# Patient Record
Sex: FEMALE | Employment: FULL TIME | URBAN - METROPOLITAN AREA
[De-identification: names, ages, dates, MRNs, and addresses within clinical notes are randomized per-mention and may not be internally consistent; named-entity substitution may affect disease eponyms.]

---

## 2018-01-23 ENCOUNTER — HOSPITAL ENCOUNTER (INPATIENT)
Facility: CLINIC | Age: 28
LOS: 8 days | Discharge: HOME | DRG: 885 | End: 2018-01-31
Attending: EMERGENCY MEDICINE | Admitting: PSYCHIATRY & NEUROLOGY
Payer: COMMERCIAL

## 2018-01-23 DIAGNOSIS — F32.1 MODERATE SINGLE CURRENT EPISODE OF MAJOR DEPRESSIVE DISORDER (H): Primary | ICD-10-CM

## 2018-01-23 DIAGNOSIS — R45.851 SUICIDAL IDEATION: ICD-10-CM

## 2018-01-23 PROBLEM — F32.A DEPRESSION: Status: ACTIVE | Noted: 2018-01-23

## 2018-01-23 LAB
ANION GAP SERPL CALCULATED.3IONS-SCNC: 8 MMOL/L (ref 3–14)
APAP SERPL-MCNC: <2 MG/L (ref 10–20)
B-HCG SERPL-ACNC: <1 IU/L (ref 0–5)
BUN SERPL-MCNC: 11 MG/DL (ref 7–30)
CALCIUM SERPL-MCNC: 8.9 MG/DL (ref 8.5–10.1)
CHLORIDE SERPL-SCNC: 105 MMOL/L (ref 94–109)
CO2 SERPL-SCNC: 25 MMOL/L (ref 20–32)
CREAT SERPL-MCNC: 0.8 MG/DL (ref 0.52–1.04)
ERYTHROCYTE [DISTWIDTH] IN BLOOD BY AUTOMATED COUNT: 12.6 % (ref 10–15)
ETHANOL SERPL-MCNC: <0.01 G/DL
GFR SERPL CREATININE-BSD FRML MDRD: 87 ML/MIN/1.7M2
GLUCOSE SERPL-MCNC: 108 MG/DL (ref 70–99)
HCT VFR BLD AUTO: 37.7 % (ref 35–47)
HGB BLD-MCNC: 13.3 G/DL (ref 11.7–15.7)
MCH RBC QN AUTO: 29.4 PG (ref 26.5–33)
MCHC RBC AUTO-ENTMCNC: 35.3 G/DL (ref 31.5–36.5)
MCV RBC AUTO: 83 FL (ref 78–100)
PLATELET # BLD AUTO: 253 10E9/L (ref 150–450)
POTASSIUM SERPL-SCNC: 3.8 MMOL/L (ref 3.4–5.3)
RBC # BLD AUTO: 4.52 10E12/L (ref 3.8–5.2)
SALICYLATES SERPL-MCNC: <2 MG/DL
SODIUM SERPL-SCNC: 138 MMOL/L (ref 133–144)
TSH SERPL DL<=0.005 MIU/L-ACNC: 0.78 MU/L (ref 0.4–4)
WBC # BLD AUTO: 7.9 10E9/L (ref 4–11)

## 2018-01-23 PROCEDURE — 80329 ANALGESICS NON-OPIOID 1 OR 2: CPT | Performed by: EMERGENCY MEDICINE

## 2018-01-23 PROCEDURE — 25000132 ZZH RX MED GY IP 250 OP 250 PS 637: Performed by: PSYCHIATRY & NEUROLOGY

## 2018-01-23 PROCEDURE — 80048 BASIC METABOLIC PNL TOTAL CA: CPT | Performed by: EMERGENCY MEDICINE

## 2018-01-23 PROCEDURE — 97150 GROUP THERAPEUTIC PROCEDURES: CPT | Mod: GO

## 2018-01-23 PROCEDURE — 84702 CHORIONIC GONADOTROPIN TEST: CPT | Performed by: EMERGENCY MEDICINE

## 2018-01-23 PROCEDURE — 25000132 ZZH RX MED GY IP 250 OP 250 PS 637

## 2018-01-23 PROCEDURE — 80320 DRUG SCREEN QUANTALCOHOLS: CPT | Performed by: EMERGENCY MEDICINE

## 2018-01-23 PROCEDURE — 84443 ASSAY THYROID STIM HORMONE: CPT | Performed by: EMERGENCY MEDICINE

## 2018-01-23 PROCEDURE — 99285 EMERGENCY DEPT VISIT HI MDM: CPT

## 2018-01-23 PROCEDURE — 85027 COMPLETE CBC AUTOMATED: CPT | Performed by: PSYCHIATRY & NEUROLOGY

## 2018-01-23 PROCEDURE — 99221 1ST HOSP IP/OBS SF/LOW 40: CPT | Mod: AI | Performed by: PHYSICIAN ASSISTANT

## 2018-01-23 PROCEDURE — 12400006 ZZH R&B MH INTERMEDIATE

## 2018-01-23 RX ORDER — ESCITALOPRAM OXALATE 10 MG/1
10 TABLET ORAL DAILY
Status: DISCONTINUED | OUTPATIENT
Start: 2018-01-23 | End: 2018-01-31 | Stop reason: HOSPADM

## 2018-01-23 RX ORDER — OLANZAPINE 10 MG/1
TABLET, ORALLY DISINTEGRATING ORAL
Status: COMPLETED
Start: 2018-01-23 | End: 2018-01-23

## 2018-01-23 RX ORDER — OLANZAPINE 5 MG/1
5 TABLET, ORALLY DISINTEGRATING ORAL AT BEDTIME
Status: DISCONTINUED | OUTPATIENT
Start: 2018-01-23 | End: 2018-01-31 | Stop reason: HOSPADM

## 2018-01-23 RX ORDER — HYDROXYZINE HYDROCHLORIDE 25 MG/1
25-50 TABLET, FILM COATED ORAL EVERY 4 HOURS PRN
Status: DISCONTINUED | OUTPATIENT
Start: 2018-01-23 | End: 2018-01-31 | Stop reason: HOSPADM

## 2018-01-23 RX ORDER — OLANZAPINE 5 MG/1
5-10 TABLET, ORALLY DISINTEGRATING ORAL EVERY 6 HOURS PRN
Status: DISCONTINUED | OUTPATIENT
Start: 2018-01-23 | End: 2018-01-31 | Stop reason: HOSPADM

## 2018-01-23 RX ADMIN — OLANZAPINE 10 MG: 5 TABLET, ORALLY DISINTEGRATING ORAL at 13:05

## 2018-01-23 RX ADMIN — HYDROXYZINE HYDROCHLORIDE 25 MG: 25 TABLET ORAL at 19:12

## 2018-01-23 RX ADMIN — ESCITALOPRAM 10 MG: 10 TABLET, FILM COATED ORAL at 19:10

## 2018-01-23 RX ADMIN — OLANZAPINE 10 MG: 10 TABLET, ORALLY DISINTEGRATING ORAL at 13:05

## 2018-01-23 RX ADMIN — OLANZAPINE 5 MG: 5 TABLET, ORALLY DISINTEGRATING ORAL at 22:09

## 2018-01-23 ASSESSMENT — ENCOUNTER SYMPTOMS
HYPERACTIVE: 0
NAUSEA: 0
WEAKNESS: 0
SEIZURES: 0
SPEECH DIFFICULTY: 0
AGITATION: 0
NERVOUS/ANXIOUS: 1
DECREASED CONCENTRATION: 0
HALLUCINATIONS: 0
WOUND: 0
DIZZINESS: 0
DIARRHEA: 0
SLEEP DISTURBANCE: 1
ABDOMINAL PAIN: 0
LIGHT-HEADEDNESS: 0
NUMBNESS: 0
ADENOPATHY: 0
HEADACHES: 0
CONFUSION: 0
DYSPHORIC MOOD: 0

## 2018-01-23 ASSESSMENT — ACTIVITIES OF DAILY LIVING (ADL)
ORAL_HYGIENE: INDEPENDENT
DRESS: INDEPENDENT;SCRUBS (BEHAVIORAL HEALTH)
HYGIENE/GROOMING: INDEPENDENT
DRESS: INDEPENDENT
LAUNDRY: WITH SUPERVISION
GROOMING: INDEPENDENT
ORAL_HYGIENE: INDEPENDENT

## 2018-01-23 NOTE — PROGRESS NOTES
01/23/18 0623   Patient Belongings   Did you bring any home meds/supplements to the hospital?  No   Patient Belongings cell phone/electronics;clothing;shoes   Disposition of Belongings Locker   Belongings Search Yes   Clothing Search Yes   Second Staff Randi     Boots with laces  Sweatpants with strings  Black dress  Vikings sweatshirt  Black winter coat  Assorted change  Key chain with 3 keys and 1 car key/fob  Cell phone    Admission:  I am responsible for any personal items that are not sent to the safe or pharmacy. [facility] is not responsible for loss, theft or damage of any property in my possession.        Patient Signature: ___________________________________________      Date/Time:__________________________     Staff Signature: __________________________________      Date/Time:__________________________     2nd Staff person, if patient is unable/unwilling to sign:      __________________________________________________________      Date/Time: __________________________        Discharge:  [facility] has returned all of my personal belongings:     Patient Signature: ________________________________________     Date/Time: ____________________________________     Staff Signature: ______________________________________     Date/Time:_____________________________________

## 2018-01-23 NOTE — PROGRESS NOTES
A 27 year old female came to  with depression with SI, pt has no specific plan.  This is pt's 1st  admit.  She has felt depressed since age 18 and she has never gotten help. Pt has been drinking alcohol up to 5x's/wk and 3-10 drinks/time. Stressor: her mom has heart failure. During the admission appeared fearful, she was flat, tearful, depressed.  Pt had difficulty talking during the admission and she wasn't able to fill out her menu and she seemed suspicious of the legal paperwork.

## 2018-01-23 NOTE — IP AVS SNAPSHOT
[place] [address] [address]    Phone:  [phone]                                       After Visit Summary   1/23/2018    Bertha Gautam    MRN: [id number]           After Visit Summary Signature Page     I have received my discharge instructions, and my questions have been answered. I have discussed any challenges I see with this plan with the nurse or doctor.    ..........................................................................................................................................  Patient/Patient Representative Signature      ..........................................................................................................................................  Patient Representative Print Name and Relationship to Patient    ..................................................               ................................................  Date                                            Time    ..........................................................................................................................................  Reviewed by Signature/Title    ...................................................              ..............................................  Date                                                            Time

## 2018-01-23 NOTE — PLAN OF CARE
"Problem: Depressive Symptoms  Goal: Depressive Symptoms  Signs and symptoms of listed problems will be absent or manageable.   Outcome: No Change  Patient presents as blunt/flat, depressed and hopeless. Present on unit but not social. Quiet. Appears to have some thought blocking. Slow to process. Seen by hospitalist and  this shift. Patient was recently diagnosed with herpes, which triggered her suicidal thoughts. Asked staff, \"What do people with herpes do, kill themselves?\" Believes she will now never be able to get  or have a relationship. Patient made a number of paranoid/delusional statements. See OT note. Attended all groups but was not able to fully participate. Seemed to have difficultly following/processing. Made strange statements not relevant to group topic. Ate very little of her breakfast or lunch. Med-compliant.      "

## 2018-01-23 NOTE — PLAN OF CARE
"Problem: General Rehab Plan of Care  Goal: Occupational Therapy Goals  The patient and/or their representative will achieve their patient-specific goals related to the plan of care.  The patient-specific goals include:  INITIAL O.T. ASSESSMENT   Details:  Pt attended OT group today. Affect was tense, distressed. Pt opted initially to observe and declined offers to engage in an activity. Pt blurted out a number of unusual statements that were off topic. \"I want to get rid of my dog. I have nothing to offer him.\" \"I don't know if Adeola is real. My mom said we moved here to be near a friend, but what if she isn't real?\" \"I bought some people lunch, but is that bad? I didn't kill anyone.\" Reality orientation was offered. Thinking appeared delusional and demonstrated low self worth. Pt was given an activity to begin and did so with set up completed by therapist. She worked for about 10 minutes and questioned her choices of color aloud. Attention was poor. Pt used materials appropriately. Performance of simple task was organized. After group, pt was directed to go to lunch. She asked, \"What am I supposed to do--look at it?\" Appetite is evidently decreased.      "

## 2018-01-23 NOTE — IP AVS SNAPSHOT
MRN:[id number]                      After Visit Summary   1/23/2018    Bertha Gautam    MRN: [id number]           Thank you!     Thank you for choosing [facility] for your care. Our goal is always to provide you with excellent care.        Patient Information     Date Of Birth          1990        Designated Caregiver       Most Recent Value    Caregiver    Will someone help with your care after discharge? yes    Name of designated caregiver mom    Phone number of caregiver see AMARJIT    Caregiver address see AMARJIT      About your hospital stay     You were admitted on:  January 23, 2018 You last received care in the:  [facility]    You were discharged on:  January 31, 2018       Who to Call     For medical emergencies, please call 911.  For non-urgent questions about your medical care, please call your primary care provider or clinic, None          Attending Provider     Provider Gurdeep Reeder MD Emergency Medicine    [facility], Zulma Mathias MD Psychiatry       Primary Care Provider Fax #    Physician No Ref-Primary [phone]      Further instructions from your care team       Behavioral Discharge Planning and Instructions    Summary: Admitted to hospital with suicidal ideation and delusional thinking.    Main Diagnosis: Psychotic disorder, unspecified; Adjustment disorder with depressed mood; Alcohol use disorder; Cannabis use disorder     Major Treatments, Procedures and Findings: psychiatric assessment; medication management    Symptoms to Report: increased confusion, mood getting worse or thoughts of suicide    Lifestyle Adjustment: Develop and follow safety plan. Sober lifestyle highly recommended. Follow up with chemical health assessment, psychotherapy and medication management. Attend AA and seek a sponsor. Enlarge your support system.    Psychiatry Follow-up:     Dr. Degroot, psychiatry- Intake on Tuesday 2/6/18. Come at 10:30 am to  "complete paperwork; interview @ 11 am.  Dr. Degroot will refer you to a therapist.  [facility]   [address] [place]  [address]  [phone]  Fax:  [phone]    Call  to schedule chemical health assessment through [facility].    AA resources given to patient at discharge.    Resources:   [facility] Service- [phone]  Crisis Intervention: 182.942.3280 or 504-266-0012 (TTY: [phone]).  Call anytime for help.  National [place] on Mental Illness (www.[place].trace.org): 708.306.7505 or 545-077-3043.  Alcoholics Anonymous (www.alcoholics-anonymous.org): Check your phone book for your local chapter.  National Suicide Prevention Line (www.mentalhealthmn.org): 734-010-PEOX (1202)    General Medication Instructions:   See your medication sheet(s) for instructions.   Take all medicines as directed.  Make no changes unless your doctor suggests them.   Go to all your doctor visits.  Be sure to have all your required lab tests. This way, your medicines can be refilled on time.  Do not use any drugs not prescribed by your doctor.  Avoid alcohol.      Pending Results     No orders found from 1/21/2018 to 1/24/2018.            Statement of Approval     Ordered          01/31/18 0830  I have reviewed and agree with all the recommendations and orders detailed in this document.  EFFECTIVE NOW     Approved and electronically signed by:  Jewel Degroot MD             Admission Information     Date & Time Provider Department Dept. Phone    1/23/2018 Zulma Monzon MD [facility] [phone]      Your Vitals Were     Blood Pressure Pulse Temperature Respirations Height Weight    120/75 82 99  F (37.2  C) (Oral) 16 1.727 m (5' 8\") 67 kg (147 lb 12.8 oz)    Pulse Oximetry BMI (Body Mass Index)                96% 22.47 kg/m2          MyChart Information     [facility] lets you send messages to your doctor, view your test results, renew your prescriptions, " "schedule appointments and more. To sign up, go to [email or web address] . Click on \"Log in\" on the left side of the screen, which will take you to the Welcome page. Then click on \"Sign up Now\" on the right side of the page.     You will be asked to enter the access code listed below, as well as some personal information. Please follow the directions to create your username and password.     Your access code is: [email or web address]  Expires: 2018 10:06 AM     Your access code will  in 90 days. If you need help or a new code, please call your [facility] clinic or [phone].        Care EveryWhere ID     This is your Care EveryWhere ID. This could be used by other organizations to access your [facility] medical records  [id number]        Equal Access to Services     CORIN MORGAN : Adis Cloud, waemerson king, lily hsu, lavon spencer . So [facility] [phone].    ATENCIÓN: Si habla español, tiene a mahoney disposición servicios gratuitos de asistencia lingüística. Llame al [phone].    We comply with applicable federal civil rights laws and [place] laws. We do not discriminate on the basis of race, color, national origin, age, disability, sex, sexual orientation, or gender identity.               Review of your medicines      START taking        Dose / Directions    escitalopram 10 MG tablet   Commonly known as:  LEXAPRO        Dose:  10 mg   Start taking on:  2018   Take 1 tablet (10 mg) by mouth daily   Quantity:  30 tablet   Refills:  0       OLANZapine 5 MG tablet   Commonly known as:  zyPREXA        Dose:  5 mg   Take 1 tablet (5 mg) by mouth At Bedtime Take 1 tablet (5 mg) orally at bedtime for 1 week, then  Take 2 tablets (10 mg) orally at bedtime   Quantity:  30 tablet   Refills:  0            Where to get your medicines      These medications were sent to [place] [address] [place] [address]  [address], " [facility] [address]     Phone:  [phone]     escitalopram 10 MG tablet    OLANZapine 5 MG tablet                Protect others around you: Learn how to safely use, store and throw away your medicines at www.disposemymeds.org.             Medication List: This is a list of all your medications and when to take them. Check marks below indicate your daily home schedule. Keep this list as a reference.      Medications           Morning Afternoon Evening Bedtime As Needed    escitalopram 10 MG tablet   Commonly known as:  LEXAPRO   Take 1 tablet (10 mg) by mouth daily   Start taking on:  2/1/2018   Last time this was given:  10 mg on 1/31/2018  8:03 AM                                OLANZapine 5 MG tablet   Commonly known as:  zyPREXA   Take 1 tablet (5 mg) by mouth At Bedtime Take 1 tablet (5 mg) orally at bedtime for 1 week, then  Take 2 tablets (10 mg) orally at bedtime

## 2018-01-23 NOTE — H&P
Admitted:     01/23/2018      PRIMARY CARE PROVIDER:  None.      CHIEF COMPLAINT:  Depression and suicidal ideation.        History obtained from chart review.      HISTORY OF PRESENT ILLNESS:  Bertha Gautam is a 27-year-old with no significant past medical history who presented to the Emergency Department today for evaluation of depression and increased suicidal ideation.  She was evaluated in the Emergency Department and admitted to inpatient Psychiatry.  Hospitalist Service was consulted for medical co-management.        Presently, the patient is evaluated in the step-down unit of the psychiatry unit.  She is quite slow to respond.  Denies current concerns or complaints.  Denies recent fevers or chills.  No nausea or vomiting.  No chest pain or shortness of breath.      PAST MEDICAL HISTORY:  No significant past medical history.      PRIOR TO ADMISSION MEDICATIONS:  None.      SURGICAL HISTORY:  None.      FAMILY HISTORY:  Mother had CHF.      SOCIAL HISTORY:  She is not an everyday smoker, though has smoked cigarettes in the past.  States she drinks too much alcohol, but is unable to quantify.  Denies that she is a daily drinker.  Denies previous history of withdrawal.  Uses marijuana on occasion but no regular use.      REVIEW OF SYSTEMS:  A 10-point review of systems was completed.  Pertinent positives are noted in the HPI, other systems negative.      PHYSICAL EXAMINATION:     GENERAL: Bertha Gautam is a well-developed, well-nourished 27-year-old female who is sitting in a chair in no acute distress.   VITAL SIGNS:  Blood pressure is 127/85, pulse 70, temperature 97.7.   HEENT:  Normocephalic, atraumatic.  Eyes:  Pupils equal, round, react to light.   CARDIOVASCULAR:  Regular rate and rhythm, no murmurs.   PULMONARY:  Normal effort.  Lungs are clear to auscultation bilaterally.   ABDOMEN:  Soft, nontender.   EXTREMITIES:  Moves all 4 extremities.   NEUROLOGIC:  Alert and oriented.  Cranial nerves II-XII  are grossly intact.   PSYCHIATRIC:  Affect is flat.      LABORATORY DATA:  Reviewed in Epic.      ASSESSMENT:  Bertha Oakley is a 27-year-old female with no significant past medical history who presented to the Emergency Department with complaints of depression.  She is currently admitted for further evaluation.   1.  Depression with suicidal ideation.  Defer management to inpatient Psychiatry.   2.  Alcohol misuse.  States she drinks too much alcohol, but is unable to quantify.  Denies that she is a daily drinker.  No history of withdrawal.  Can monitor for signs of withdrawal.  Blood alcohol level was negative.  No signs of withdrawal currently.   3.  Deep venous thrombosis prophylaxis.  Ambulation.      CODE STATUS:  Full code.      We, the Hospitalist Service, thank you for this consult.  The patient is medically stable.  Hospitalist Service will sign off.  Please do not hesitate to call if additional concerns arise.      This patient was discussed with Dr. Wen Vicente of the Hospitalist Service who independently interviewed and examined the patient.  He is in agreement with the above plan.         WEN VICENTE MD       As dictated by FRAN LOPEZ PA-C            D: 2018   T: 2018   MT: ANDRE      Name:     BERTHA OAKLEY   MRN:      [id number]        Account:      [id number]   :      1990        Admitted:     2018                   Document: [id number]

## 2018-01-23 NOTE — ED PROVIDER NOTES
"  History     Chief Complaint:  Suicidal    HPI   Bertha Gautam is a 27 year old female who presents with suicidal ideation.  Patient comes in today via EMS, whom she called herself, and response to increasing thoughts of suicide.  Patient states that she has been feeling down over the past few months, but has increased over the past few days.  She states that nothing specifically sparked her feeling down over the past few days, but admits that she did smoke marijuana yesterday as she does occasionally.  The patient states that she feels hopeless, has thoughts about drinking and not waking up, or taking things to not wake up.  She reports sporadic sleep patterns, sleeping too much and too little, and also often skipping work due to feeling down/her current mental state that day.  She states she does not feel safe at home and states \"I want to see my family and tell them I need to be put away\".  She admits to desires of hurting herself, but has no specific plan.  She does not have any weapons at home and denies any history of thyroid problems.  She is amenable to staying in the hospital and seeing a psychiatrist.  She has never been diagnosed nor has she been on medications for mental health diagnoses in the past.  No other symptoms or concerns reported.    Allergies:  No known drug allergies.    Medications:    The patient is currently on no regular medications.    Past Medical History:    No significant past medical history.     Past Surgical History:    No pertinent past surgical history.    Family History:    No pertinent family history.    Social History:  Smoking status: Never smoker  Alcohol use: Yes  Marital Status:  Single   Marijuana use: occasional    Review of Systems   Gastrointestinal: Negative for abdominal pain, diarrhea and nausea.   Skin: Negative for wound.   Neurological: Negative for dizziness, seizures, syncope, speech difficulty, weakness, light-headedness, numbness and headaches. " "  Hematological: Negative for adenopathy.   Psychiatric/Behavioral: Positive for sleep disturbance and suicidal ideas. Negative for agitation, behavioral problems, confusion, decreased concentration, dysphoric mood, hallucinations and self-injury. The patient is nervous/anxious. The patient is not hyperactive.    All other systems reviewed and are negative.    Physical Exam     Patient Vitals for the past 24 hrs:   BP Temp Temp src Pulse Resp SpO2 Height Weight   01/23/18 0623 - - - - - - 1.727 m (5' 8\") 67.6 kg (149 lb)   01/23/18 0621 128/77 98  F (36.7  C) Oral 97 16 - - -   01/23/18 0333 137/74 99.1  F (37.3  C) Oral 91 19 99 % 1.727 m (5' 8\") 68 kg (150 lb)         Physical Exam  General: woman sitting upright  HENT: mucous membranes moist  Eyes: PERRL without nystagmus  CV: extremities well perfused, regular rhythm  Resp:  normal effort, clear throughout  GI: abdomen soft and nontender, no guarding  MSK: no bony tenderness   Skin: appropriately warm and dry  Neuro: alert, clear speech, oriented, normal tone in extremities, ambulatory  Psych:  calm, cooperative, reports feeling suicidal, no evidence of hallucinations, flat affect, occasionally tearful      Emergency Department Course   Laboratory:  BMP: Glucose 108 (H), otherwise WNL (Creatinine 0.80)  (0424) Acetaminophen: <2  (0424) Salicylate: <2  (0404) TSH: 0.78  (0424) MICHOACANO: <0.01    Emergency Department Course:  Nursing notes and vitals reviewed.  (0330) I performed an exam of the patient as documented above.    (0338) I spoke with central intake about the patient in order for them to start investigating for bed placement for the patient.    Blood was drawn from the patient. This was sent for laboratory testing, findings above.     (0408) I spoke with a psychiatrist about the patient and her current state.  Dr. Degroot requests basic screening labs.    Findings and plan explained to the patient who consents to admission.   I discussed the patient with Dr." Zane of the hospitalist service, who will admit the patient to a psychiatric bed for further monitoring, evaluation, and treatment.    Impression & Plan    Medical Decision Making:  Bertha Gautam is a 27 year old female who presents with signs and symptoms of decompensated depression with suicidal thoughts.  She is calm cooperative and sober, desiring help.  Medical precipitants such as thyroid derangement and metabolic abnormalities were considered.  Screening tests were sent at the request of the accepting psychiatrist.  A bed has been secured here for her at [facility] and she was moved upstairs uneventfully for further care.    Diagnosis:    ICD-10-CM   1. Suicidal ideation R45.851       Disposition:  Patient is admitted to a psychiatric bed under the care of Dr. Degroot.            I, Bairon Morse, am serving as a scribe on 1/23/2018 at 3:30 AM to personally document services performed by Dr. Barahona based on my observations and the provider's statements to me.      Bairon Morse  1/23/2018    EMERGENCY DEPARTMENT       Gurdeep Barahona MD  01/23/18 0951

## 2018-01-24 PROCEDURE — 12400006 ZZH R&B MH INTERMEDIATE

## 2018-01-24 PROCEDURE — 25000132 ZZH RX MED GY IP 250 OP 250 PS 637: Performed by: PSYCHIATRY & NEUROLOGY

## 2018-01-24 PROCEDURE — 97150 GROUP THERAPEUTIC PROCEDURES: CPT | Mod: GO

## 2018-01-24 PROCEDURE — 90853 GROUP PSYCHOTHERAPY: CPT

## 2018-01-24 RX ADMIN — ESCITALOPRAM 10 MG: 10 TABLET, FILM COATED ORAL at 09:09

## 2018-01-24 ASSESSMENT — ACTIVITIES OF DAILY LIVING (ADL)
DRESS: SCRUBS (BEHAVIORAL HEALTH);INDEPENDENT
HYGIENE/GROOMING: PROMPTS
ORAL_HYGIENE: INDEPENDENT

## 2018-01-24 NOTE — PROGRESS NOTES
"[facility] Psychiatric Progress Note      Interval History:   Pt seen, team meeting held with , nursing staff, OT, and PA's to review diagnosis and treatment plan.  Patient reports significant dysphoria.  She believes that she had spread herpes 2 different men she had been with as she never informed them that she had been diagnosed with the disease.  She states she has never been treated for it but it is unclear if she does have genital herpes or not.  She continues reported desire to pursue chemical use treatment but the same time continues to report nihilistic delusions about people wanting her dead.  She claims she realizes that she has been a very bad person all her life because \"I have given her people by saying hello to them.\"  She was advised that her use of chemicals may be affecting her perception and mood has her current melancholy and delusions.  She states she like to be seen by the hospitalist to treat her reported herpes.  She still has get her insurance information able though she states she has [employer] medical insurance.     Review of systems:   The Review of Systems is negative other than noted in the HPI     Medications:       OLANZapine zydis  5 mg Oral At Bedtime     escitalopram  10 mg Oral Daily     hydrOXYzine, OLANZapine zydis    Mental Status Examination:     Appearance:  awake, alert, adequately groomed and casually dressed  Eye Contact:  better  Speech:  clear, coherent  Psychomotor Behavior:  no evidence of tardive dyskinesia, dystonia, or tics and fidgeting  Mood: Depressed and sad  Affect: Flat with constricted range   Thought Process:  logical, linear and goal oriented no loose associations  Thought Content: Passive death wish with nihilistic delusions.  Denies presence of auditory or visual hallucinations  Oriented to:  time, person, and place  Attention Span and Concentration:  limited  Recent and Remote Memory:  limited  Fund of Knowledge: " appropriate  Muscle Strength and Tone: normal  Gait and Station: Normal  Insight:  fair  Judgment:  limited          Labs/Vitals:   No results found for this or any previous visit (from the past 24 hour(s)).  B/P: 130/77, T: 97.6, P: 57, R: 16    Impression:   Bertha Gautam is a 27-year-old single mother of none, who reports established history of alcohol use disorder and cannabis use disorder.  She had increasingly been depressed and over the last few days started entertaining self-harm thoughts.  It appears she has also become psychotic since using cannabis a couple of days ago and is currently in some sort of psychotic depressive state.  She is requesting chemical use treatment.       DIagnoses:     1.  Psychotic disorder, unspecified, rule out substance-induced psychotic disorder.   2.  Adjustment disorder with depressed mood, rule out major depressive disorder, single episode, severe, with psychotic features.     3.  Alcohol use disorder.   4.  Cannabis use disorder.   5.  ?Genital herpes.          Plan:   1. Written information given on medications. Side effects, risks, benefits reviewed.  2.  Increase advised to 10 mg p.o. nightly.  Continue Lexapro 10 mg daily  3.  Hospitalist consult  4.  Chemical use assessment.  5.  Dr. Degroot will assume her care tomorrow.      Attestation:  Patient has been seen and evaluated by me,  Zulma Monzon MD    PATIENT ID  Name: Bertha Gautam  MRN:[id number]  YOB: 1990

## 2018-01-24 NOTE — PROGRESS NOTES
"Pt has been sitting in the shultz by the nursing station and appeared uncomfortable. Pt said, \"Why are they going to pull out my teeth and my fingernails?\" When pt was told that no one was going to do that to her. Pt insisted that they are getting a room ready for her, to pull out her teeth and nails. Reassurance given that pt was safe and no harm would come to her.  Pt then said, \"I think all the other pt's are talking about me, I think they are afraid I'll try and steal their boyfriends.\"  Pt spoke about Herpes and how she is not comfortable with the disease and how it could ruin her life. Pt wants to tell people that she is sorry for the things that she has done. Emotional support given. Pt assured others are not talking about her. Pt was encouraged to go to group and pt did go to group.  "

## 2018-01-24 NOTE — PLAN OF CARE
Problem: Depressive Symptoms  Goal: Depressive Symptoms  Signs and symptoms of listed problems will be absent or manageable.     Pt presents with a flat/blunt and sad affect and has a depressed mood.  Pt did not attend any groups and declined a 1:1 because she wanted to sleep.  Pt was isolative and withdrawn on the unit and spent almost the entire shift sleeping in her bed. Pt had her family visit today and afterwards, went to sleep for the rest of the shift.

## 2018-01-24 NOTE — PLAN OF CARE
"Problem: Depressive Symptoms  Goal: Depressive Symptoms  Signs and symptoms of listed problems will be absent or manageable.   Outcome: No Change  Pt presents with a sad affect and depressed mood. She made some odd statements during groups \" I should die\" and \" nobody wants me here.\" During 1:1 patient reported her peers are talking about her and wanting to donate her organs. Believes she will die soon and that people want her dead. Pt later stated this has to do with guilt and having herpes. She also mentioned that Zyprexa is making her jittery.       "

## 2018-01-24 NOTE — CONSULTS
1/24/18 cd consult acknowledged.  Per EMR patient has no listed insurance, patient would need to seek Rule 25 funding from [facility] for chem dep services.  Social work can assist with resources.

## 2018-01-24 NOTE — PLAN OF CARE
Problem: General Rehab Plan of Care  Goal: Occupational Therapy Goals  The patient and/or their representative will achieve their patient-specific goals related to the plan of care.  The patient-specific goals include:  Pt will engage in group activities to improve social skills.   Pt attended 3/3 OT groups today and primarily observed. She did not engage in OT group. She was much less verbal today and did not make any odd statements. In Life Skills group, she observed only. In Exercise group, pt followed directions 100% of the time during structured activity. She appears to do better with a more structured environment.
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
## 2018-01-25 PROCEDURE — 25000132 ZZH RX MED GY IP 250 OP 250 PS 637: Performed by: PSYCHIATRY & NEUROLOGY

## 2018-01-25 PROCEDURE — 90853 GROUP PSYCHOTHERAPY: CPT

## 2018-01-25 PROCEDURE — 12400006 ZZH R&B MH INTERMEDIATE

## 2018-01-25 RX ADMIN — ESCITALOPRAM 10 MG: 10 TABLET, FILM COATED ORAL at 11:54

## 2018-01-25 RX ADMIN — OLANZAPINE 5 MG: 5 TABLET, ORALLY DISINTEGRATING ORAL at 22:41

## 2018-01-25 RX ADMIN — HYDROXYZINE HYDROCHLORIDE 25 MG: 25 TABLET ORAL at 11:54
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
## 2018-01-25 NOTE — PLAN OF CARE
Problem: General Plan of Care (Inpatient Behavioral)  Goal: Discharge Planning  Attended Process Group on 1/24/18. Participation complete-yhemes of gult and worry about having hurt others.

## 2018-01-25 NOTE — PLAN OF CARE
"Problem: Depressive Symptoms  Goal: Depressive Symptoms  Signs and symptoms of listed problems will be absent or manageable.   Outcome: Improving  Patient presents with sad, blunt/flat affect and depressed, hopeless mood. Spent most of shift in lounge. Was more social than on previous shifts and spent time talking with peers, which was her goal for the day. Sister and family friends visited this shift. Stated she did a lot of reflecting today. Expressed concern about the well-being of a peer.   Staff gave patient a folder of information about her gHSV-1 diagnosis, which regularly causes patient much distress. Patient believes she can never have a normal life and this gives patient suicidal thoughts. Patient asked staff if people would hurt her family because of her diagnosis. Patient is concerned about disclosing her HSV status. Staff reassured patient it was only necessary to disclose to sexual partners. Patient was specifically concerned about disclosing to people she drinks with. States she goes out with friends and blacks out from drinking and sometimes wakes up naked. States she is unsure if people \"have sex with her\" while she is blacked out and is concerned about disclosing to them. This individuals claim nothing happened but she feels she can't trust them. Wondered if she needs to tell everyone she drinks with \"don't try to have sex with me, I have herpes.\" Staff reassured patient she has no obligation to disclose to those that take advantage of her, especially if they lie about it. Patient had further questions about dating with HSV and having a normal life (believed neither was possible) and by the end of the conversation told staff she was feeling a little better and that the talk was helpful. Plans to reread the information again.   While reading some of the printouts, patient was very confused by some of the writing that was more abstract. Didn't understand jokes in the articles. Thinking is very " "concrete.   Patient declined her bedtime meds and asked why staff tries to make them take \"sleeping pills.\" Feels she sleeps on her own just fine. Attended wrap-up group and participated appropriately.      "

## 2018-01-25 NOTE — PROGRESS NOTES
"[facility] Psychiatric Progress Note      Interval History:   Pt seen, team meeting held with , nursing staff, OT, and PA's to review diagnosis and treatment plan.  Staff reports she continued to make delusional statements yesterday. She is described as making paranoid statements about \"committing a crime.\" She was emotional in a group setting this morning. She reportedly refused her olanzapine last night.     On interview, she reports she wonders if she does not need to be here. She describes contiued depression and anxiety. Continues to be paranoid and has delusional guilt. She acknowledges she felt suicidal last night but will not disclose what type of thoughts she had. She did report this to staff last night. She denies physical health concerns. She did not sleep well last night. She describes poor appetite, poor energy and motivation and some hopelessness.     Her mother called to convey concerns about her drinking and overall mental health. Apparently she binge drinks four times a week or so and can get in vulnerable situations when blacking out.    Review of systems:   The Review of Systems is negative other than noted in the HPI     Medications:       OLANZapine zydis  5 mg Oral At Bedtime     escitalopram  10 mg Oral Daily     hydrOXYzine, OLANZapine zydis    Mental Status Examination:     Appearance:  awake, alert, adequately groomed and casually dressed  Eye Contact:  fair  Speech:  clear, coherent  Psychomotor Behavior:  no evidence of tardive dyskinesia, dystonia, or tics and fidgeting  Mood: Depressed and sad  Affect: Flat with constricted range   Thought Process:  logical, linear and goal oriented no loose associations  Thought Content: Passive death wish with nihilistic delusions.  Denies presence of auditory or visual hallucinations  Oriented to:  time, person, and place  Attention Span and Concentration:  limited  Recent and Remote Memory:  limited  Fund of Knowledge: " appropriate  Muscle Strength and Tone: normal  Gait and Station: Normal  Insight:  fair  Judgment:  limited          Labs/Vitals:   No results found for this or any previous visit (from the past 24 hour(s)).  B/P: 130/77, T: 97.6, P: 57, R: 16    Impression:   Bertha Gautam is a 27-year-old single mother of none, who reports established history of alcohol use disorder and cannabis use disorder.  She had increasingly been depressed and over the last few days started entertaining self-harm thoughts.  It appears she has also become psychotic since using cannabis a couple of days ago and is currently in some sort of psychotic depressive state.  She is requesting chemical use treatment. She has refused her Zyprexa on 1/25 and did not sleep well. Appears more psychotic today.       DIagnoses:     1.  Psychotic disorder, unspecified, rule out substance-induced psychotic disorder.   2.  Adjustment disorder with depressed mood, rule out major depressive disorder, single episode, severe, with psychotic features.     3.  Alcohol use disorder.   4.  Cannabis use disorder.   5.  ?Genital herpes.          Plan:   1. Continue current plan of care.   2.  Continue Zyprexa 5 mg HS.  Continue Lexapro 10 mg daily.   3.  Chemical use assessment once she is more clear  4.  Every shift vitals and CIWA scoring. This morning she is not showing any evidence of withdrawal. No tremors, tongue fasciculations, diaphoresis. No asterixis. Gait is fully stable. Vitals stable. If she starts scoring on CIWA we will start full CIWA protocol, but given history of binge drinking pattern and no reported history of withdrawals risk is low.   5.  If patient requests to leave, place on 72 hour hold. If she continues to refuse medications and continues to decline we may place her on a hold to consider possible need for petition for commitment.       Attestation:  Patient has been seen and evaluated by me,  Jewel Degroot MD    PATIENT ID  Name: Bertha  Dain  MRN:[id number]  YOB: 1990

## 2018-01-25 NOTE — PROGRESS NOTES
"Patient approached nurses' station at around 0400 and requested to speak with a staff member because she felt she'd \"committed a crime.\" Patient was referring to the fact that she'd allowed friends to share drinks with her and (in her mind) this had given all of them herpes. Staff assured patient that was not possible as her herpes is genital and not oral, and that transmitting herpes was not a crime.   Patient's thinking appears very confused and paranoid. Slow to process/respond and very soft-spoken. Stated multiple times she believes people want to hurt her and kill her (both in and out of the hospital). Believes the writing on the chalk board in the hallway was written by other patients with the intent to be mean to her. Believes her friends set up scenarios in which she will make a fool of herself so they can laugh at her. Asked staff \"why they put stuff in another room for her.\" Was not able to clarify this statement.   Says she feels a \"deep sadness\" about her HSV status. Patient feels very guilty about it because she believes she's hurting people. Patient doesn't want to keep it a secret. Patient thinks she's a bad person and needs to be punished, \"but not cruelly.\" Jealous of others she perceives as nice, hard-working and funny, all of which she believes she is not.   States she isn't close with her family and doesn't talk to them much at all, so having them visit here in the hospital makes her anxious.  Had trouble sleeping, during conversation stated she hadn't slept at all yet. States she hasn't been able to eat. Declined medication, food/drink and said she'll wait until breakfast.  "

## 2018-01-25 NOTE — PROGRESS NOTES
"Pt assessed for CIWA, no tremors are visible or felt. Increased appetite, no diaphoresis currently, pt said her palms were sweaty earlier.  No headache or prickly skin.  VS WNL. Pt was not aware of today's date. Continue to observe for possible withdrawal. Pt said, \"The last time I had any alcohol was last weekend.\"    "

## 2018-01-25 NOTE — PLAN OF CARE
Problem: General Plan of Care (Inpatient Behavioral)  Goal: Team Discussion  Team Plan:    Outcome: Declining  BEHAVIORAL TEAM DISCUSSION    Participants: Dr. Degroot, Nursing staff, Case management, PA's  Progress: Not well, declining, suicidal thoughts last night, overwhelmed with groups and peers/staff  Continued Stay Criteria/Rationale: Paranoid, psychotic, not well, didn't sleep last night, asking to D/C  Medical/Physical:   Precautions:   Behavioral Orders   Procedures     Code 1 - Restrict to Unit     Routine Programming     As clinically indicated     Status 15     Every 15 minutes.     Plan: Continue stay of hospitalization, maybe move back to [facility], place on hold (72-hour), increase observation of patient  Rationale for change in precautions or plan: declining, paranoid, psychotic, didn't sleep last night, overwhelmed with groups, asking to D/C      Problem: Patient Care Overview  Goal: Team Discussion  Team Plan:    Outcome: Declining  BEHAVIORAL TEAM DISCUSSION    Participants: Dr. Degroot, Nursing staff, Case management, PA's  Progress: Not well, declining, suicidal thoughts last night, overwhelmed with groups and peers/staff  Continued Stay Criteria/Rationale: Paranoid, psychotic, not well, didn't sleep last night, asking to D/C  Medical/Physical:   Precautions:   Behavioral Orders   Procedures     Code 1 - Restrict to Unit     Routine Programming     As clinically indicated     Status 15     Every 15 minutes.     Plan: Continue stay of hospitalization, maybe move back to [facility], place on hold (72-hour), increase observation of patient  Rationale for change in precautions or plan: declining, paranoid, psychotic, didn't sleep last night, overwhelmed with groups, asking to D/C

## 2018-01-25 NOTE — PLAN OF CARE
Problem: Depressive Symptoms  Goal: Depressive Symptoms  Signs and symptoms of listed problems will be absent or manageable.   Outcome: Therapy, unable to show any progress toward functional goals  Please see multiple note this shift r/t this pt.

## 2018-01-25 NOTE — PROGRESS NOTES
"Patient was participating it the 1000 focus group when she started to become disruptive with her negative and distracting talk about herself saying \"it you want me to do it I will, kill myself\"... and other negative comments about self.  Staff attempted to change the topic but she kept on.  She was escorted out of the group and staff then spent some 1:1 time with staff.  She was distant and was not able to track the simple conversation.  After 5 minutes she was convinced that she saw staff at her work once and they were \"with Katie\".    "
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
## 2018-01-26 LAB
AMPHETAMINES UR QL SCN: NEGATIVE
BARBITURATES UR QL: NEGATIVE
BENZODIAZ UR QL: NEGATIVE
CANNABINOIDS UR QL SCN: NEGATIVE
COCAINE UR QL: NEGATIVE
OPIATES UR QL SCN: NEGATIVE
PCP UR QL SCN: NEGATIVE

## 2018-01-26 PROCEDURE — 80307 DRUG TEST PRSMV CHEM ANLYZR: CPT | Performed by: EMERGENCY MEDICINE

## 2018-01-26 PROCEDURE — 12400006 ZZH R&B MH INTERMEDIATE

## 2018-01-26 PROCEDURE — 25000132 ZZH RX MED GY IP 250 OP 250 PS 637: Performed by: PSYCHIATRY & NEUROLOGY

## 2018-01-26 RX ADMIN — OLANZAPINE 5 MG: 5 TABLET, ORALLY DISINTEGRATING ORAL at 20:15

## 2018-01-26 RX ADMIN — ESCITALOPRAM 10 MG: 10 TABLET, FILM COATED ORAL at 07:41

## 2018-01-26 RX ADMIN — OLANZAPINE 10 MG: 5 TABLET, ORALLY DISINTEGRATING ORAL at 08:36

## 2018-01-26 NOTE — PROGRESS NOTES
"Pt was seen panicking in the hallway while speaking with staff. As this writer came through unit doors, pt attempted to walk out but was redirectable. When asked what was wrong, pt stated she felt her family was in danger and made odd statements, asking if she was \"going to die or burn\". 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 Pt was much calmer after talking through her problems and was able to attend process group. Pt placed on 72 hour hold and plan is to move pt to [facility].  "

## 2018-01-26 NOTE — PLAN OF CARE
"Problem: Depressive Symptoms  Goal: Depressive Symptoms  Signs and symptoms of listed problems will be absent or manageable.   Outcome: No Change  Pt presented with a flat affect. Continues to endorse feeling depressed. Expressed feeling down one minute and normal the next. Displays paranoid thinking. Delusional. Requested to speak with staff several times throughout the shift, where she continuously expressed the same concerns. She expressed feeling worried about what people here think of her, and feels like people are staring at her, but does not understand why as she believes she is acting normal and thinking clearly. Stated she gets \"jittery\" when people look at her. Tearful at times, rocking back and forth in her chair. Was encouraged to seek out staff if she felt unsafe or needs to talk.     Towards the end of the shift pt was observed talking with her roommate. A few minutes later, at 2200, pt approached the med window asking to use her phone to get a phone number. She called 911 instead, and refused to give her phone back. Eventually gave her phone to staff. Demanded that the  be called back. She expressed that she doesn't feel safe here, believed that the maintenance staff were turning off all the cameras, and that she was in danger. Thought staff was going to lock her in her room. She repeatedly stated she wanted to leave. Stated that she does not feel that she is getting better, and that no one has talked to her all day (though staff talked with her several times throughout the shift). Initially refused to take her HS medications, and made comments that her medications make her sleepy, and thinks her medications will kill her. Was standing at the medication window, and refused to move. Eventually sat down in a chair after about a half an hour. Signed 12 hour intent to leave.       "

## 2018-01-26 NOTE — PLAN OF CARE
Problem: General Plan of Care (Inpatient Behavioral)  Goal: Discharge Planning  Patient was in and out of the room during Process Group. She did not participate in any of the relaxation exercises. Her response during brief group discussion was minimal but relevant.

## 2018-01-26 NOTE — PROGRESS NOTES
Pt signed 12 hour intent to leave at 2230.  Will notify MD.      She has been paranoid, alluded to beliefs that food and beverages have been poisoned with mediations, verbalized delusions of reference, is irrational, and has a psychotic stare.  See psych Associate note for further detail.  Pt eventually took scheduled hs Zyprexa Zydis 5 mg and was given an extra 5 mg of her PRN given the intensity of symptoms; however, given her previous complaints of Zyprexa causing her to feel shaky the full 10 mg was not administered.  Will continue to monitor.  Encouraged pt to try and sleep before speaking with the MD in the morning.

## 2018-01-26 NOTE — PLAN OF CARE
"Problem: Depressive Symptoms  Goal: Depressive Symptoms  Signs and symptoms of listed problems will be absent or manageable.   Outcome: Therapy, progress toward functional goals is gradual  Pt requested to talk with a nurse. During 1:1 pt said, \"I feel like everyone wants to hurt me, even the other pt's.\" Pt was reassured that she is safe and protected and no one wants to harm her.  Pt was encouraged to take her medications as pt was refusing medications yesterday.  Pt said that she took them and will ask for something for anxiety. Pt c/o being constipated the past few days. Pt was encouraged to drink some prune juice.  Pt agreed to try it. Emotional support given.          "

## 2018-01-27 PROCEDURE — 12400006 ZZH R&B MH INTERMEDIATE

## 2018-01-27 PROCEDURE — 25000132 ZZH RX MED GY IP 250 OP 250 PS 637: Performed by: PSYCHIATRY & NEUROLOGY

## 2018-01-27 RX ADMIN — OLANZAPINE 5 MG: 5 TABLET, ORALLY DISINTEGRATING ORAL at 20:27

## 2018-01-27 RX ADMIN — OLANZAPINE 10 MG: 5 TABLET, ORALLY DISINTEGRATING ORAL at 23:28

## 2018-01-27 RX ADMIN — ESCITALOPRAM 10 MG: 10 TABLET, FILM COATED ORAL at 08:16

## 2018-01-27 NOTE — PROGRESS NOTES
[facility] Psychiatric Progress Note      Interval History:   Pt seen, team meeting held with , nursing staff, OT, and PA's to review diagnosis and treatment plan.  Staff reports she continued to be paranoid, demonstrating continued odd statements and guilt. She placed a 12 hour notice that expires this morning.     On interview, she reports she reports she continues to be depressed, but thinks perhaps the hospital is making matters worse. Nonetheless, she continues to openly acknowledge suicidal ideation. She has no plan or intent in the holspital setting and believes she can stay safe in this structured setting. She still is paranoid and makes delusional statements when asked about her level of guilt and depression.   Review of systems:   The Review of Systems is negative other than noted in the HPI     Medications:       OLANZapine zydis  5 mg Oral At Bedtime     escitalopram  10 mg Oral Daily     hydrOXYzine, OLANZapine zydis    Mental Status Examination:     Appearance:  awake, alert, adequately groomed and casually dressed  Eye Contact:  fair  Speech:  clear, coherent  Psychomotor Behavior:  no evidence of tardive dyskinesia, dystonia, or tics and fidgeting  Mood: Depressed and sad  Affect: Flat with constricted range   Thought Process:  logical, linear and goal oriented no loose associations  Thought Content: Passive death wish with nihilistic delusions.  Denies presence of auditory or visual hallucinations  Oriented to:  time, person, and place  Attention Span and Concentration:  limited  Recent and Remote Memory:  limited  Fund of Knowledge: appropriate  Muscle Strength and Tone: normal  Gait and Station: Normal  Insight:  fair  Judgment:  limited          Labs/Vitals:     Recent Results (from the past 24 hour(s))   Drug abuse screen 77 urine ([place], [place], [facility])    Collection Time: 01/26/18  3:40 PM   Result Value Ref Range    Amphetamine Qual Urine Negative NEG^Negative     Barbiturates Qual Urine Negative NEG^Negative    Benzodiazepine Qual Urine Negative NEG^Negative    Cannabinoids Qual Urine Negative NEG^Negative    Cocaine Qual Urine Negative NEG^Negative    Opiates Qualitative Urine Negative NEG^Negative    PCP Qual Urine Negative NEG^Negative     B/P: 130/77, T: 97.6, P: 57, R: 16    Impression:   Bertha Gautam is a 27-year-old single mother of none, who reports established history of alcohol use disorder and cannabis use disorder.  She had increasingly been depressed and over the last few days started entertaining self-harm thoughts.  It appears she has also become psychotic since using cannabis a couple of days ago and is currently in some sort of psychotic depressive state.  She is requesting chemical use treatment. She has refused her Zyprexa on 1/25 and did not sleep well. Appears more psychotic today.     1/26/18  Continues to be delusional at times and also continues to have intermittent SI. She has poor insight regarding her drinking problems as well as her mental health. Despite insufficient objective improvement in her symptoms, she nonetheless is no longer willing to remain in the hospital on a voluntary basis and did not retract her 12 hour hold. We held a team meeting and all were in agreement (nursing staff, social work and psychiatrist) that the patient remains in immediate danger in her current state if she were to be discharged, and thus she has been placed on a 72 hour hold.       DIagnoses:     1.  Psychotic disorder, unspecified, rule out substance-induced psychotic disorder.   2.  Adjustment disorder with depressed mood, rule out major depressive disorder, single episode, severe, with psychotic features.     3.  Alcohol use disorder.   4.  Cannabis use disorder.   5.  ?Genital herpes.          Plan:   1. Continue current plan of care.   2.  Continue Zyprexa 5 mg HS.  Continue Lexapro 10 mg daily. PRNs available.   3.  Chemical use assessment once she is  more clear  4.  Patient meets criteria for immediate danger if she were released in present condition given ongoing severe depression, with nihilistic delusions, and ongoing intermittent suicidal ideation and without suitable disposition plan at present.     Attestation:  Patient has been seen and evaluated by me,  Jewel Degroot MD    PATIENT ID  Name: Bertha Gautam  MRN:[id number]  YOB: 1990

## 2018-01-27 NOTE — PROGRESS NOTES
Father informed staff that the patient may not have smoked marijuana prior to admission and that it may have been K2.

## 2018-01-27 NOTE — PLAN OF CARE
"Problem: Depressive Symptoms  Intervention: Social and Therapeutic Interv (Depressive Symptoms)  Pt anxious during shift, asked to speak to writer and other staff members regarding concerns about spreading her \"virus\" to past boyfriends. Pt also states she feels bad for hurting other people. Pt reports feeling that she is \"wasting a bed,\" reassured that there are things to work on while she is in the hospital. Pt overly apologetic for things such as closing the bathroom door. Pt observed eating breakfast tray. Observed on the unit interacting with peers. Asked writer for permission to take a nap, reassured that she does not need permission for taking naps. Pt observed visiting with father. Pt perseverative regarding wanting to leave so that she can help others.         "

## 2018-01-27 NOTE — PLAN OF CARE
Problem: Depressive Symptoms  Goal: Depressive Symptoms  Signs and symptoms of listed problems will be absent or manageable.   Outcome: No Change  Pt has spent much of the shift bedresting.  Expressed concern for her safety while on the unit and requested to be locked in her room as she thought her peers may do something to her.  Explained that this was not possible. Offered reassurance.  She apologized for attempting to elope and said it was because she does not feel safe.  Pt was inquiring about when she could leave and had questions about her medications.  She is concerned that she has been tired.  Declined to eat dinner, reported that she has lost her appetite.  Food was saved and snacks were offered but she declined.  She took her hs medication with encouragement.  Continues to appear paranoid but less tense. Continues to lack insight. Spoke with pt's father to answer questions and address his concerns.

## 2018-01-28 PROCEDURE — 12400006 ZZH R&B MH INTERMEDIATE

## 2018-01-28 PROCEDURE — 90853 GROUP PSYCHOTHERAPY: CPT

## 2018-01-28 PROCEDURE — 25000132 ZZH RX MED GY IP 250 OP 250 PS 637: Performed by: PSYCHIATRY & NEUROLOGY

## 2018-01-28 RX ADMIN — OLANZAPINE 5 MG: 5 TABLET, ORALLY DISINTEGRATING ORAL at 11:11

## 2018-01-28 RX ADMIN — ESCITALOPRAM 10 MG: 10 TABLET, FILM COATED ORAL at 08:23

## 2018-01-28 RX ADMIN — OLANZAPINE 5 MG: 5 TABLET, ORALLY DISINTEGRATING ORAL at 20:19

## 2018-01-28 ASSESSMENT — ACTIVITIES OF DAILY LIVING (ADL)
ORAL_HYGIENE: INDEPENDENT
DRESS: STREET CLOTHES
GROOMING: INDEPENDENT

## 2018-01-28 NOTE — PROGRESS NOTES
"Patient requested to talk to staff at the beginning of the shift. She stated that she didn't understand why everyone thought she was crazy. When asked to clarify who, she stated \"my family, staff, everyone.\" Reassured her that no one was calling her crazy. She continued to make guilty statements about her past, and how she feels bad. She stated \"she was a drunk, at times would sleep with different people, and talk shit about others.\" This writer offered words of encouragement. Patient appeared tremulous during conversation, and couldn't clarify why she was tremulous. Patient was give PRN Zyprexa due to her feeling \"shakey.\" Patient was encouraged to focus on bettering herself, and getting some sleep. She wanted this writer to sit in her room because she does not feel comfortable being alone. Patient was asleep within a few minutes.     Patient continues to display paranoid thinking, delayed responses, poor eye contact, and lacks insight.   "

## 2018-01-28 NOTE — PLAN OF CARE
Problem: Depressive Symptoms  Intervention: Social and Therapeutic Interv (Depressive Symptoms)  Pt had father and friend visit during shift, spend majority of time in room visiting. Ate food brought by friend. Observed in milieu, appropriate in interactions, behaviorally calm and controlled.

## 2018-01-28 NOTE — PLAN OF CARE
"Problem: Depressive Symptoms  Goal: Depressive Symptoms  Signs and symptoms of listed problems will be absent or manageable.   Outcome: No Change  Patient speaks very softly, is tearful. Speaks of having \"done things wrong\" in her life. When asked to elaborate, states she spent too much time drinking and \"doing the wrong thing\". Patient lives with her twin sister who is a . Zyprexa 5 mg given per prn order.       "

## 2018-01-29 PROCEDURE — 25000132 ZZH RX MED GY IP 250 OP 250 PS 637: Performed by: PSYCHIATRY & NEUROLOGY

## 2018-01-29 PROCEDURE — 97150 GROUP THERAPEUTIC PROCEDURES: CPT | Mod: GO

## 2018-01-29 PROCEDURE — 90853 GROUP PSYCHOTHERAPY: CPT

## 2018-01-29 PROCEDURE — 12400006 ZZH R&B MH INTERMEDIATE

## 2018-01-29 RX ADMIN — OLANZAPINE 5 MG: 5 TABLET, ORALLY DISINTEGRATING ORAL at 21:01

## 2018-01-29 RX ADMIN — ESCITALOPRAM 10 MG: 10 TABLET, FILM COATED ORAL at 07:05

## 2018-01-29 NOTE — PLAN OF CARE
Problem: Depressive Symptoms  Intervention: Social and Therapeutic Interv (Depressive Symptoms)  Pt observed to be calm, controlled, behaviorally appropriate. Pt has been attending groups on SDU, has been able to participate in milieu effectively. Pt has friend bringing a laptop so she can complete a timecard for work, staff to remain with pt while this is done. Pt soft spoken, does not need as much reassurance from staff. Cooperative with medication.

## 2018-01-29 NOTE — PROGRESS NOTES
"[facility] Psychiatric Progress Note      Interval History:   Pt seen, team meeting held with , nursing staff, OT, and PA's to review diagnosis and treatment plan.  I reviewed all progress notes from the weekend. The last documented mention of delusional guilt was yesterday morning. She was given Zyprexa PRN. She did have an attempted elopement over the weekend and was moved to the [facility].  She continues to demonstrate reserved but cooperative behavior in the milieu.     On interview, Bertha reports some improvement over the weekend. She is still sullen but notes some lift in her mood. She does not report any delusional guilt today. She speaks though about having Herpes 1 and is conflicted about this. However, the intensity seems much lessened. No suicidal thoughts. No hallucinations. Reports slight improvement in energy, motivation, sleep and appetite.     Collateral obtained from the patient's mother, Teresita. She reports they feel she has improved over the weekend. She anticipates her daughter will be safe to come home this week, perhaps in the next few days. She believes Bertha must have used \"laced\" marijuana prior to admission, though she is aware that depression can cause psychosis as well. She agrees that family will watch closely for any re-emerging symptoms.        Review of systems:   The Review of Systems is negative other than noted in the HPI     Medications:       OLANZapine zydis  5 mg Oral At Bedtime     escitalopram  10 mg Oral Daily     hydrOXYzine, OLANZapine zydis    Mental Status Examination:     Appearance:  awake, alert, adequately groomed and casually dressed  Eye Contact:  Fair, improving  Speech:  clear, coherent, quiet, monotone   Psychomotor Behavior:  no evidence of tardive dyskinesia, dystonia, or tics and fidgeting  Mood: Depressed and sad  Affect: Flat with constricted range   Thought Process:  logical, linear and goal oriented no loose " associations  Thought Content: SI Improving. Delusions lessening.  Denies presence of auditory or visual hallucinations  Oriented to:  time, person, and place  Attention Span and Concentration:  Improving  Recent and Remote Memory:  Improving  Fund of Knowledge: appropriate  Muscle Strength and Tone: normal  Gait and Station: Normal  Insight:  fair  Judgment:  limited          Labs/Vitals:     No results found for this or any previous visit (from the past 24 hour(s)).  B/P: 130/77, T: 97.6, P: 57, R: 16    Impression:   Bertha Gautam is a 27-year-old single mother of none, who reports established history of alcohol use disorder and cannabis use disorder.  She had increasingly been depressed and over the last few days started entertaining self-harm thoughts.  It appears she has also become psychotic since using cannabis a couple of days ago and is currently in some sort of psychotic depressive state.  She is requesting chemical use treatment. She has refused her Zyprexa on 1/25 and did not sleep well. Appears more psychotic today.     1/26/18  Continues to be delusional at times and also continues to have intermittent SI. She has poor insight regarding her drinking problems as well as her mental health. Despite insufficient objective improvement in her symptoms, she nonetheless is no longer willing to remain in the hospital on a voluntary basis and did not retract her 12 hour hold. We held a team meeting and all were in agreement (nursing staff, social work and psychiatrist) that the patient remains in immediate danger in her current state if she were to be discharged, and thus she has been placed on a 72 hour hold.       DIagnoses:     1.  Psychotic disorder, unspecified, rule out substance-induced psychotic disorder.   2.  Adjustment disorder with depressed mood, rule out major depressive disorder, single episode, severe, with psychotic features.     3.  Alcohol use disorder.   4.  Cannabis use disorder.   5.   ?Genital herpes.          Plan:   1. Continue current plan of care.   2.  Continue Zyprexa 5 mg HS.  Continue Lexapro 10 mg daily. PRNs available.   3.  Chemical use assessment as she seems to have cleared. She demonstrates good insight today that her alcohlol use is a problem. She and her family confirm that they do not believe the marijuana is a problem as she uses it only rarely.       Attestation:  Patient has been seen and evaluated by me,  Jewel Degroot MD    PATIENT ID  Name: Bertha Gautam  MRN:[id number]  YOB: 1990

## 2018-01-29 NOTE — PLAN OF CARE
Problem: Depressive Symptoms  Goal: Depressive Symptoms  Signs and symptoms of listed problems will be absent or manageable.   Outcome: No Change  Pt has spent much of the shift visiting with her family.  Showered.  Spent time in the lounge socializing with peers.  Remains soft spoken and appears to have delayed reponses. Appears preoccupied. No overt paranoid statements made this shift.  Appears blunted in affect and anxious in mood. Med compliant.

## 2018-01-29 NOTE — PLAN OF CARE
Problem: General Rehab Plan of Care  Goal: Occupational Therapy Goals  The patient and/or their representative will achieve their patient-specific goals related to the plan of care.  The patient-specific goals include:  Pt will engage in group activities to improve social skills.   Pt participated in OT activity group today with encouragement. Affect was a bit vacant but brightened during interactions. Pt selected and initiated a familiar task. She planned ahead and problem solved independently. Behavior was organized. Pt communicated her needs effectively. Speech was articulate and appropriate to context.

## 2018-01-30 PROCEDURE — 25000132 ZZH RX MED GY IP 250 OP 250 PS 637: Performed by: PSYCHIATRY & NEUROLOGY

## 2018-01-30 PROCEDURE — 12400006 ZZH R&B MH INTERMEDIATE

## 2018-01-30 PROCEDURE — 90853 GROUP PSYCHOTHERAPY: CPT

## 2018-01-30 RX ADMIN — ESCITALOPRAM 10 MG: 10 TABLET, FILM COATED ORAL at 07:45

## 2018-01-30 RX ADMIN — OLANZAPINE 5 MG: 5 TABLET, ORALLY DISINTEGRATING ORAL at 20:13

## 2018-01-30 ASSESSMENT — ACTIVITIES OF DAILY LIVING (ADL)
ORAL_HYGIENE: INDEPENDENT
DRESS: INDEPENDENT
LAUNDRY: WITH SUPERVISION
GROOMING: INDEPENDENT

## 2018-01-30 NOTE — PLAN OF CARE
Problem: Depressive Symptoms  Goal: Depressive Symptoms  Signs and symptoms of listed problems will be absent or manageable.   Outcome: Improving  Slight delay in response. Visible on the unit, attending groups, Slightly blunted, brightens on approach and during conversation. Med compliant. Pt reports feeling better, more like herself. Pt is hopefull to discharge to home tonight or tomorrow.

## 2018-01-30 NOTE — PROGRESS NOTES
[facility] Psychiatric Progress Note      Interval History:   Pt seen, team meeting held with , nursing staff, OT, and PA's to review diagnosis and treatment plan.  I reviewed all progress notes from the weekend. Staff note she has been looking better in the milieu. She transitioned back to SDU without difficulty. Attending groups. Visited by family and her dog and this went well.     On interview, Bertha reports continued improvement in depression and anxiety. She reports she is sleeping well. She is eating well and reports improved energy and motivation. She denies side effects from medications. She is still ambivalent about treatment. She has called AA and gotten more information. She seems to have limited insight into potential value of more formal CD treatment. She does not yet have any follow up appointments with outpatient providers.     In hindsight, she does acknowledge depression has been prominent for months, and was educated about possible psychosis from either depression or the marijuana. She is not certain herself what is more likely to have caused her symptoms.      Review of systems:   The Review of Systems is negative other than noted in the HPI     Medications:       OLANZapine zydis  5 mg Oral At Bedtime     escitalopram  10 mg Oral Daily     hydrOXYzine, OLANZapine zydis    Mental Status Examination:     Appearance:  awake, alert, adequately groomed and casually dressed  Eye Contact:  Good  Speech:  clear, coherent, quiet, monotone   Psychomotor Behavior:  no evidence of tardive dyskinesia, dystonia, or tics and fidgeting  Mood: Improving  Affect: [place], some improvement.   Thought Process:  logical, linear and goal oriented no loose associations  Thought Content: SI reported as improved. Delusions now appear to no longer be active.  Denies presence of auditory or visual hallucinations  Oriented to:  time, person, and place  Attention Span and Concentration:   Improving  Recent and Remote Memory:  Improving  Fund of Knowledge: appropriate  Muscle Strength and Tone: normal  Gait and Station: Normal  Insight:  fair  Judgment:  limited          Labs/Vitals:     No results found for this or any previous visit (from the past 24 hour(s)).  B/P: 130/77, T: 97.6, P: 57, R: 16    Impression:   Bertha Gautam is a 27-year-old single mother of none, who reports established history of alcohol use disorder and cannabis use disorder.  She had increasingly been depressed and over the last few days started entertaining self-harm thoughts.  It appears she has also become psychotic since using cannabis a couple of days ago and is currently in some sort of psychotic depressive state.  She is requesting chemical use treatment. She has refused her Zyprexa on 1/25 and did not sleep well. Appears more psychotic today.     1/26/18  Continues to be delusional at times and also continues to have intermittent SI. She has poor insight regarding her drinking problems as well as her mental health. Despite insufficient objective improvement in her symptoms, she nonetheless is no longer willing to remain in the hospital on a voluntary basis and did not retract her 12 hour hold. We held a team meeting and all were in agreement (nursing staff, social work and psychiatrist) that the patient remains in immediate danger in her current state if she were to be discharged, and thus she has been placed on a 72 hour hold.     1/30/18  Now is starting to appear much better overall and we can now transition to discharge planning. We will try to help her schedule outpatient follow up appointments. Ideally, it would be nice to have her get an evaluation for chemical dependency but we later found out her benefits are not active, so she will need to have a Rule 25 scheduled as an oupatient. Reviewed her request to have her hold lifted today with the multidisciplinary team. Given risk of relapse and decompensation  appears elevated, given she does not yet have outpatient follow up established, and no clear plan about getting CD assessment we all agreed the risk was too high to warrant removal of 72 hours hold today. We will thus continue hospitalization until tomorrow and try to arrange an adequate discharge plan prior to the hold expiring.       DIagnoses:     1.  Psychotic disorder, unspecified, rule out substance-induced psychotic disorder.   2.  Adjustment disorder with depressed mood, rule out major depressive disorder, single episode, severe, with psychotic features.     3.  Alcohol use disorder.   4.  Cannabis use disorder.   5.  ?Genital herpes.          Plan:   1. Continue current plan of care.   2.  Continue Zyprexa 5 mg HS.  Continue Lexapro 10 mg daily. PRNs available. She would benefit from scheduling outpatient psychiatry and therapy follow up. Zyprexa will be continued and attempted taper deferred to her next outpatient provider.   3.  We will try to help her arrange Rule 25 assessment as outpatient.       Attestation:  Patient has been seen and evaluated by me,  Jewel Degroot MD    PATIENT ID  Name: Bertha Gautam  MRN:[id number]  YOB: 1990

## 2018-01-30 NOTE — PROGRESS NOTES
"Nara TAYLOR CD counselor called and said, \"Pt has no active insurance. Pt will need a Rule 25\". Cori RILEY was notified and she said, \"Pt has preferred one and it was reinstated. \" Business office was called and a message was left to please verify pt's insurance and need for MA and or a Rule 25 for CD assessment.\"   "

## 2018-01-30 NOTE — PLAN OF CARE
Problem: Depressive Symptoms  Goal: Depressive Symptoms  Signs and symptoms of listed problems will be absent or manageable.   Outcome: Improving   Patient moved to the SDU where she acclimated well. Her family and dog visited this dalia. Reports she feels like she is 100% and reports her family told her she is back to being her. States she would like to discharge soon as she has a lot to do and states her family is ready for her to come home. Reports her stay here was beneficial and feels like whatever was in her system has left.  She was present on the unit, social, and attended groups with proper participation. Patient able to hold a coherent conversation, has a full range affect and appears calm in mood.

## 2018-01-30 NOTE — CONSULTS
"CD consult acknowledged. Per chart review insurance referral notes on 1/23/18:\"Notified unit charge RN that pt does not have active benefits\" when the insurance bennies were run. Per referral notes pt did not have active insurance bennies for Jan, Dec, or Nov. Per chart review, the writer could not find any verified insurance information or insurance card in Epic on file. She will need Rule 25 funding for chem dep services through her county of residence. Social work can provide resources.   "

## 2018-01-31 VITALS
OXYGEN SATURATION: 96 % | TEMPERATURE: 99 F | HEIGHT: 68 IN | HEART RATE: 82 BPM | DIASTOLIC BLOOD PRESSURE: 75 MMHG | WEIGHT: 147.8 LBS | BODY MASS INDEX: 22.4 KG/M2 | SYSTOLIC BLOOD PRESSURE: 120 MMHG | RESPIRATION RATE: 16 BRPM

## 2018-01-31 PROCEDURE — 25000132 ZZH RX MED GY IP 250 OP 250 PS 637: Performed by: PSYCHIATRY & NEUROLOGY

## 2018-01-31 RX ORDER — OLANZAPINE 5 MG/1
5 TABLET ORAL AT BEDTIME
Qty: 30 TABLET | Refills: 0 | Status: ON HOLD | OUTPATIENT
Start: 2018-01-31 | End: 2018-11-01

## 2018-01-31 RX ORDER — ESCITALOPRAM OXALATE 10 MG/1
10 TABLET ORAL DAILY
Qty: 30 TABLET | Refills: 0 | Status: ON HOLD | OUTPATIENT
Start: 2018-02-01 | End: 2018-11-01

## 2018-01-31 RX ADMIN — ESCITALOPRAM 10 MG: 10 TABLET, FILM COATED ORAL at 08:03

## 2018-01-31 NOTE — DISCHARGE INSTRUCTIONS
Behavioral Discharge Planning and Instructions    Summary: Admitted to hospital with suicidal ideation and delusional thinking.    Main Diagnosis: Psychotic disorder, unspecified; Adjustment disorder with depressed mood; Alcohol use disorder; Cannabis use disorder     Major Treatments, Procedures and Findings: psychiatric assessment; medication management    Symptoms to Report: increased confusion, mood getting worse or thoughts of suicide    Lifestyle Adjustment: Develop and follow safety plan. Sober lifestyle highly recommended. Follow up with chemical health assessment, psychotherapy and medication management. Attend AA and seek a sponsor. Enlarge your support system.    Psychiatry Follow-up:     Dr. Degroot, psychiatry- Intake on Tuesday 2/6/18. Come at 10:30 am to complete paperwork; interview @ 11 am.  Dr. Degroot will refer [place] [address]  [phone]  Fax:  [phone]    Call  to schedule chemical health assessment through [email or web address].    AA resources given to patient at discharge.    Resources:   [facility] Crisis Service-   Crisis Intervention: 418.562.7527 or 418-919-2320 (TTY: 770.364.4345).  Call anytime for help.  National [employer] Mental Illness (www.mn.trace.org): 574.495.5087 or 190-998-1228.  Alcoholics Anonymous (www.alcoholics-anonymous.org): Check your phone book for your local chapter.  National Suicide Prevention Line (www.mentalhealthmn.org): 923-240-NMGG (2669)    General Medication Instructions:   See your medication sheet(s) for instructions.   Take all medicines as directed.  Make no changes unless your doctor suggests them.   Go to all your doctor visits.  Be sure to have all your required lab tests. This way, your medicines can be refilled on time.  Do not use any drugs not prescribed by your doctor.  Avoid alcohol.

## 2018-01-31 NOTE — PLAN OF CARE
Problem: Depressive Symptoms  Goal: Depressive Symptoms  Signs and symptoms of listed problems will be absent or manageable.   Outcome: Improving  Pt visited with father, sister and a friend this shift. Waiting to hear if she needs a Rule 25. Business office aware per Day shift notes. Went to wrap-up group. During 1:1 Pt claims she feels ready for discharge. Would like to discharge tomorrow after talking with MD. Wants outpatient psych care appointment set up before discharge. Mood appears stable.

## 2018-01-31 NOTE — PROGRESS NOTES
Discharge teaching done with pt and pt's SO Harlan.. Pt denied SI.  Pt verbalized understanding of medication and out pt f/u [place] plan. Belongings returned to pt, medications are being filled at pt's own pharmacy. Pt discharged to home. Pt's SO Harlan is providing pt with transportation home.

## 2018-01-31 NOTE — PROGRESS NOTES
[facility] Psychiatric Progress Note      Interval History:   Pt seen, team meeting held with , nursing staff, OT, and PA's to review diagnosis and treatment plan. Staff note she did very well again yesterday. Some slowness of responses but otherwise is doing well in the milieu. She is clear in her conversations with others. Staff feels she has substantially improved. She again had a nice visit with family yesterday.     On interview, Bertha reports her mood has improved substantially. She in hindsight feels she had mild depression and does think that the alcohol use and the marijuana use led to her admission with it causing new suicidal thoughts. She confirms she is not at all having suicidal thoughts. Energy, motivation improving. Sleeping well. Reports she is feeling positive about discharge when hold expires. She feels ready. She would like to follow up with mental health providers and continue her medications for now. She is interested in therapy. Denies safety concerns including thoughts of harming self or others. No voices or paranoia. She is no longer showing nihilistic delusional thinking.      Review of systems:   The Review of Systems is negative other than noted in the HPI     Medications:   Lexapro 10 mg daily  Olanzapine 5 mg HS        Mental Status Examination:     Appearance:  awake, alert, adequately groomed and casually dressed  Eye Contact:  Good  Speech:  clear, coherent, quiet, monotone   Psychomotor Behavior:  no evidence of tardive dyskinesia, dystonia, or tics and fidgeting  Mood: Reports it is good.   Affect:  Slightly brighter, mildly bland   Thought Process:  logical, linear and goal oriented no loose associations  Thought Content: SI reported as resolved. Delusions no longer be active.  Denies presence of auditory or visual hallucinations  Oriented to:  time, person, and place  Attention Span and Concentration:  Improving  Recent and Remote Memory:   Improving  Fund of Knowledge: appropriate  Muscle Strength and Tone: normal  Gait and Station: Normal  Insight:  Good, recognizes concerns about her mental health and need to seek sobriety.   Judgment:  Collaborative, future oriented          Labs/Vitals:     No results found for this or any previous visit (from the past 24 hour(s)).  B/P: 130/77, T: 97.6, P: 57, R: 16    Impression:   Bertha Gautam is a 27-year-old single mother of none, who reports established history of alcohol use disorder and cannabis use disorder.  She had increasingly been depressed and over the last few days started entertaining self-harm thoughts.  It appears she has also become psychotic since using cannabis a couple of days ago and is currently in some sort of psychotic depressive state.  She is requesting chemical use treatment. She has refused her Zyprexa on 1/25 and did not sleep well. Appears more psychotic today.     1/26/18  Continues to be delusional at times and also continues to have intermittent SI. She has poor insight regarding her drinking problems as well as her mental health. Despite insufficient objective improvement in her symptoms, she nonetheless is no longer willing to remain in the hospital on a voluntary basis and did not retract her 12 hour hold. We held a team meeting and all were in agreement (nursing staff, social work and psychiatrist) that the patient remains in immediate danger in her current state if she were to be discharged, and thus she has been placed on a 72 hour hold.     1/30/18  Now is starting to appear much better overall and we can now transition to discharge planning. We will try to help her schedule outpatient follow up appointments. Ideally, it would be nice to have her get an evaluation for chemical dependency but we later found out her benefits are not active, so she will need to have a Rule 25 scheduled as an oupatient. Reviewed her request to have her hold lifted today with the  multidisciplinary team. Given risk of relapse and decompensation appears elevated, given she does not yet have outpatient follow up established, and no clear plan about getting CD assessment we all agreed the risk was too high to warrant removal of 72 hours hold today. We will thus continue hospitalization until tomorrow and try to arrange an adequate discharge plan prior to the hold expiring.     1/31/18  Her hold is expiring today. Due to insurance questions she was not able to have a chem dep eval in the hospital. She declines offer to wait in the hospital to see if this can be arranged today. She feels confident about discharging today with plan to follow up with providers, remain sober, and continue her medications. She describes in clear detail how meaningful it has been to have visits with her family on the unit, and feels motivated to try to remain well and sober. She is willing to schedule CD assessment as an outpatient and has already contact AA and expressed interest in starting to attend meetings.      Suicide risk assessment despite history of alcohol problems, previous attempt, depression is acutely low as she is firmly future oriented, no longer psychotic or agitated, collaborative, now more help seeking, supportive family, no suicidal ideation, reports willingness to help seek if she experiences a crisis. Denies access to firearms. Risks of violence low as she has no thoughts of harming others and is calm and cooperative.       DIagnoses:     1.  Psychotic disorder, unspecified, rule out substance-induced psychotic disorder.   2.  Adjustment disorder with depressed mood, rule out major depressive disorder, single episode, severe, with psychotic features.     3.  Alcohol use disorder.   4.  Cannabis use disorder.   5.  ?Genital herpes.          Plan:   1.  The patient is no longer holdable. Both she and her family feel she is ready for discharge. Staff have observed strong objective improvement.   2.   Continue Zyprexa 5 mg HS.  Continue Lexapro 10 mg daily. She has arranged follow up in psychiatry and would like to start therapy.   3.  We recommend she have a CD assessment as an outpatient and recommended that she follow their recommendations. She has clear insight into the dangers of alcohol and need to remain sober.       Attestation:  Patient has been seen and evaluated by me,  Jewel Degroot MD    PATIENT ID  Name: Bertha Gautam  MRN:[id number]  YOB: 1990

## 2018-01-31 NOTE — PROGRESS NOTES
Spoke to [facility] business office about patient's insurance. From their records was told she does have [employer] and it is active. They have manually verified it. Added that in EPIC patient's name is spelled with 2 L's and on her insurance card it is spelled with 1 L, so that can cause some confusion, as it won't show up unless spelling matches that on insurance card.     Called Nara Ferguson and left message with above information, asking that an  see patient.

## 2018-02-01 NOTE — L&D DELIVERY NOTE
Delivery Date:  2018      DATE OF ADMISSION:  2018.      DATE OF DISCHARGE:  2018 at 10:53 a.m.       SUMMARY:  Ms. Bertha Gautam is a 27-year-old young woman from [place]. She presented in the context of ongoing depression and alcohol intoxication as well as recent marijuana use.  She has demonstrated suicidal ideation and the holistic delusions.  She has paranoid behaviors and ideas.        HOSPITAL SUMMARY:  She is admitted to the adult psychiatry unit.  She was started on escitalopram as well as olanzapine for her depression and psychotic symptoms.  She continued to demonstrate substantial paranoia for the first several days of admission.  She had significant slowness in responses, and would make bizarre statements at times.        She gradually demonstrated improvements in her thought organization and a gradual improvement of her paranoia.  She started to sleep soundly.  Her energy and motivation demonstrated progress.  She started to behave appropriately with peers and staff in the milieu.  Paranoid statements gradually declined, and by the last few days of admission, were no longer present.  Suicidal ideation gradually improved to the point of being absent for the last several days of admission.  Affect improved.  She started to more coherently discuss discharge plans including the importance of establishing outpatient psychiatric care with medication management providers and a therapist.        At one point earlier in the admission, she continued to request discharge and given the ongoing paranoia, thought disorganization and suicidality was placed on a 72-hour hold.  She continued admission as a result until the hold that  on 2018.         At the time of her hold expiring,  she requested discharge.  We had recommended chemical dependency evaluation prior to discharge.  Unfortunately, due to some insurance-related questions, she was not able to see chemical dependency  prior to discharge.  She was willing to consider an outpatient chemical dependency assessment after discharge.        DISCHARGE DIAGNOSES:      1.  Psychotic disorder, unspecified.  Substance-induced psychotic disorder versus major depressive disorder with psychotic features.   2.  Alcohol use disorder.   3.  Cannabis use disorder.      DISCHARGE MEDICATIONS:     1.  Continue escitalopram 10 mg daily for depression.   2.  Continue olanzapine 5 mg at bedtime for psychosis.      LAB REVIEW: Basic metabolic panel was unremarkable including creatinine 0.8, sodium 138, potassium 3.8.  TSH is 0.78, glucose was 108.  CBC was unremarkable including hemoglobin 13.3, hematocrit 37.7, platelet count 233.  Urine drug screen was negative on 2018, although was not obtained at admission on  2018.  Quantitative hCG less than 1  (negative.)      DISCHARGE FOLLOWUP RECOMMENDATIONS:     1.  She will agree to follow up with [facility],  with Dr. Hamida Bird for medication management.  Dr. Bird will also refer her to [facility] for establishing an outpatient therapy followup provider.  2.  The patient was asked to call [phone] to schedule chemical health assessment through [employer].     3.  She is provided with Alcoholics Anonymous resources and had contacted AA prior to discharge to get further information.  She indicated desire to continue with AA meetings after discharge.     4. She was advised to present for chemical dependency assessment and follow their recommendations for treatment.         HAMIDA BIRD MD             D: 2018   T: 2018   MT: JEANNINE      Name:     RAMONITA OAKLEY   MRN:      [id number]        Account:        [id number]   :      1990        Delivery Date:  2018               Document: [id number]

## 2018-02-01 NOTE — DISCHARGE SUMMARY
Admit Date:     2018   Discharge Date:     2018      DATE OF ADMISSION:  2018.         DATE OF DISCHARGE:  2018 at 10:53 a.m.          Admission SUMMARY:  Ms. Bertha Gautam is a 27-year-old young woman from [place]. She presented in the context of ongoing depression and alcohol intoxication as well as recent marijuana use.  She has demonstrated suicidal ideation and the nihilistic delusions.  She had paranoid behaviors and ideas.           Brief Hospital Course:  She is admitted to the adult psychiatry unit.  She was started on escitalopram as well as olanzapine for her depression and psychotic symptoms.  She continued to demonstrate substantial paranoia for the first several days of admission.  She had significant slowness in responses, and would make bizarre statements at times.           She gradually demonstrated improvements in her thought organization and a gradual improvement of her paranoia.  She started to sleep soundly.  Her energy and motivation demonstrated progress.  She started to behave appropriately with peers and staff in the milieu.  Paranoid statements gradually declined, and by the last few days of admission, were no longer present.  Suicidal ideation gradually improved to the point of being absent for the last several days of admission.  Affect improved.  She started to more coherently discuss discharge plans including the importance of establishing outpatient psychiatric care with medication management providers and a therapist.           At one point earlier in the admission, she continued to request discharge and given the ongoing paranoia, thought disorganization and suicidality was placed on a 72-hour hold.  She continued admission as a result until the hold that  on 2018.            At the time of her hold expiring,  she requested discharge.  We had recommended chemical dependency evaluation prior to discharge.  Unfortunately, due to some  insurance-related questions, she was not able to see chemical dependency prior to discharge.  She was willing to consider an outpatient chemical dependency assessment after discharge.           DISCHARGE DIAGNOSES:       1.  Psychotic disorder, unspecified.  Substance-induced psychotic disorder versus major depressive disorder with psychotic features.    2.  Alcohol use disorder.    3.  Cannabis use disorder.         DISCHARGE MEDICATIONS:      1.  Continue escitalopram 10 mg daily for depression.    2.  Continue olanzapine 5 mg at bedtime for psychosis.         LAB REVIEW: Basic metabolic panel was unremarkable including creatinine 0.8, sodium 138, potassium 3.8.  TSH is 0.78, glucose was 108.  CBC was unremarkable including hemoglobin 13.3, hematocrit 37.7, platelet count 233.  Urine drug screen was negative on 2018, although was not obtained at admission on  2018.  Quantitative hCG less than 1  (negative.)         DISCHARGE FOLLOWUP RECOMMENDATIONS:      1.  She will agree to follow up with [facility],  with Dr. Jewel Bird for medication management.  Dr. Bidr will also refer her to [facility] for establishing an outpatient therapy followup provider.  2.  The patient was asked to call [phone] to schedule chemical health assessment through [employer].      3.  She is provided with Alcoholics Anonymous resources and had contacted AA prior to discharge to get further information.  She indicated desire to continue with AA meetings after discharge.      4. She was advised to present for chemical dependency assessment and follow their recommendations for treatment.       Revised WT:  2018, khanh BIRD MD             D: 2018   T: 2018   MT: JEANNINE      Name:     RAMONITA OAKLEY   MRN:      [id number]        Account:        [id number]   :      1990           Admit Date:     2018                                  Discharge Date: 2018       Document: [id number]

## 2018-10-28 ENCOUNTER — HOSPITAL ENCOUNTER (EMERGENCY)
Facility: CLINIC | Age: 28
Discharge: PSYCHIATRIC OR SUBSTANCE USE FACILITY | End: 2018-10-28
Attending: EMERGENCY MEDICINE | Admitting: EMERGENCY MEDICINE
Payer: COMMERCIAL

## 2018-10-28 ENCOUNTER — HOSPITAL ENCOUNTER (INPATIENT)
Facility: CLINIC | Age: 28
LOS: 4 days | Discharge: LEFT BEFORE DISCHARGE | DRG: 881 | End: 2018-11-01
Attending: PSYCHIATRY & NEUROLOGY | Admitting: PSYCHIATRY & NEUROLOGY
Payer: COMMERCIAL

## 2018-10-28 VITALS
BODY MASS INDEX: 21.98 KG/M2 | WEIGHT: 145 LBS | SYSTOLIC BLOOD PRESSURE: 132 MMHG | HEIGHT: 68 IN | DIASTOLIC BLOOD PRESSURE: 73 MMHG | OXYGEN SATURATION: 99 % | TEMPERATURE: 98 F | RESPIRATION RATE: 20 BRPM

## 2018-10-28 DIAGNOSIS — F06.1 CATATONIA ASSOCIATED WITH ANOTHER MENTAL DISORDER: Primary | ICD-10-CM

## 2018-10-28 DIAGNOSIS — F29 PSYCHOSIS, UNSPECIFIED PSYCHOSIS TYPE (H): ICD-10-CM

## 2018-10-28 LAB — ALCOHOL BREATH TEST: 0 (ref 0–0.01)

## 2018-10-28 PROCEDURE — 25000132 ZZH RX MED GY IP 250 OP 250 PS 637: Performed by: STUDENT IN AN ORGANIZED HEALTH CARE EDUCATION/TRAINING PROGRAM

## 2018-10-28 PROCEDURE — 99285 EMERGENCY DEPT VISIT HI MDM: CPT | Mod: 25

## 2018-10-28 PROCEDURE — 82075 ASSAY OF BREATH ETHANOL: CPT

## 2018-10-28 PROCEDURE — 96372 THER/PROPH/DIAG INJ SC/IM: CPT

## 2018-10-28 PROCEDURE — 12400007 ZZH R&B MH INTERMEDIATE UMMC

## 2018-10-28 PROCEDURE — 25000132 ZZH RX MED GY IP 250 OP 250 PS 637: Performed by: EMERGENCY MEDICINE

## 2018-10-28 PROCEDURE — 90791 PSYCH DIAGNOSTIC EVALUATION: CPT

## 2018-10-28 RX ORDER — HYDROXYZINE HYDROCHLORIDE 25 MG/1
25-50 TABLET, FILM COATED ORAL EVERY 4 HOURS PRN
Status: DISCONTINUED | OUTPATIENT
Start: 2018-10-28 | End: 2018-11-01 | Stop reason: HOSPADM

## 2018-10-28 RX ORDER — TRAZODONE HYDROCHLORIDE 50 MG/1
50 TABLET, FILM COATED ORAL
Status: DISCONTINUED | OUTPATIENT
Start: 2018-10-28 | End: 2018-11-01 | Stop reason: HOSPADM

## 2018-10-28 RX ORDER — OLANZAPINE 10 MG/1
10 TABLET ORAL
Status: DISCONTINUED | OUTPATIENT
Start: 2018-10-28 | End: 2018-10-31

## 2018-10-28 RX ORDER — OLANZAPINE 10 MG/2ML
10 INJECTION, POWDER, FOR SOLUTION INTRAMUSCULAR
Status: DISCONTINUED | OUTPATIENT
Start: 2018-10-28 | End: 2018-10-31

## 2018-10-28 RX ORDER — OLANZAPINE 10 MG/2ML
10 INJECTION, POWDER, FOR SOLUTION INTRAMUSCULAR
Status: COMPLETED | OUTPATIENT
Start: 2018-10-28 | End: 2018-10-28

## 2018-10-28 RX ORDER — ACETAMINOPHEN 325 MG/1
650 TABLET ORAL EVERY 4 HOURS PRN
Status: DISCONTINUED | OUTPATIENT
Start: 2018-10-28 | End: 2018-10-28 | Stop reason: HOSPADM

## 2018-10-28 RX ORDER — ACETAMINOPHEN 325 MG/1
650 TABLET ORAL EVERY 4 HOURS PRN
Status: DISCONTINUED | OUTPATIENT
Start: 2018-10-28 | End: 2018-11-01 | Stop reason: HOSPADM

## 2018-10-28 RX ORDER — OLANZAPINE 5 MG/1
5 TABLET, ORALLY DISINTEGRATING ORAL
Status: COMPLETED | OUTPATIENT
Start: 2018-10-28 | End: 2018-10-28

## 2018-10-28 RX ADMIN — OLANZAPINE 10 MG: 10 TABLET, FILM COATED ORAL at 21:52

## 2018-10-28 RX ADMIN — OLANZAPINE 5 MG: 5 TABLET, ORALLY DISINTEGRATING ORAL at 13:33

## 2018-10-28 ASSESSMENT — ACTIVITIES OF DAILY LIVING (ADL)
DRESS: INDEPENDENT;PROMPTS
LAUNDRY: UNABLE TO COMPLETE
ORAL_HYGIENE: INDEPENDENT;PROMPTS
GROOMING: INDEPENDENT;PROMPTS

## 2018-10-28 ASSESSMENT — ENCOUNTER SYMPTOMS: DYSPHORIC MOOD: 1

## 2018-10-28 NOTE — ED NOTES
Patient requesting something to help her sleep.  Discussed Zyprexa as an option at this time.  Patient agreed to this course of action.

## 2018-10-28 NOTE — IP AVS SNAPSHOT
"                  MRN:[id number]                      After Visit Summary   10/28/2018    Tony Gautam    MRN: [id number]           Thank you!     Thank you for choosing [facility] for your care. Our goal is always to provide you with excellent care.        Patient Information     Date Of Birth          1990        Designated Caregiver       Most Recent Value    Caregiver    Will someone help with your care after discharge? no [:no one\"]    Name of designated caregiver none      About your hospital stay     You were admitted on:  October 28, 2018 You last received care in the:  [facility]    You were discharged on:  November 1, 2018       Who to Call     For medical emergencies, please call 911.  For non-urgent questions about your medical care, please call your primary care provider or clinic, None          Attending Provider     Provider Specialty    Gerber Koroma MD Psychiatry       Primary Care Provider Fax #    Physician No Ref-Primary [phone]      Further instructions from your care team        Behavioral Discharge Planning and Instructions      Summary:  You were admitted on 10/28/2018  due to Disorganized Thinking/Behaviors and Psychotic Symptomology.  You were treated by Dr. Gerber Koroma MD and discharged on 11/1/18 from [facility]. You are being discharged against medical advice (AMA) as the treatment team believes that you could benefit from further care while you stabilize. You are encouraged to return to the hospital if your symptoms worsen to continue treatment.        Principal Diagnosis: Depression with psychotic features    Health Care Follow-up Appointments:   Date/Time: Tuesday Nov. 6th 12pm   Provider: Zuly Dawson (Therapy)  Date/Time: Wednesday Nov. 28th 11am    Provider: Dr. Gisel Mcdowell (Medication Management)  [address] [address]   Phone: [phone] Fax: [phone]    Attend all scheduled appointments with your outpatient providers. Call at " "least 24 hours in advance if you need to reschedule an appointment to ensure continued access to your outpatient providers.   Major Treatments, Procedures and Findings:  You were provided with: a psychiatric assessment and medication evaluation and/or management    Symptoms to Report: feeling more aggressive, increased confusion, mood getting worse or thoughts of suicide    Early warning signs can include: increased depression or anxiety sleep disturbances increased thoughts or behaviors of suicide or self-harm     Safety and Wellness:  Take all medicines as directed.  Make no changes unless your doctor suggests them.      Follow treatment recommendations.  Refrain from alcohol and non-prescribed drugs.  If there is a concern for safety, call 911.    Resources:   Crisis Intervention: 981.662.6040 or 683-660-0691 (TTY: 851.735.2305).  Call anytime for help.  National [place] on Mental Illness (www.[place].trace.org): 821.225.5584 or 039-453-5542.  Suicide Awareness Voices of Education (SAVE) (www.save.org): 313-635-AAQD (8745)  National Suicide Prevention Line (www.mentalhealthmn.org): 949-117-UUYT (1534)  Mental Health Consumer/Survivor Network of [place] (www.mhcsn.net): 164.398.2927 or 891-172-8479  Self- Management and Recovery Training., Fluid-1-- Toll free: 729.266.4449  www.[employer].SMITH (formerly [employer])  [facility] [place] Crisis (COPE) Response - Adult 796 424-7345  Text 4 Life: txt \"LIFE\" to 04827 for immediate support and crisis intervention  Crisis text line: Text \"[place]\" to 616331. Free, confidential, 24/7.  Crisis Intervention: 103.162.1174 or 356-583-5860. Call anytime for help.   [facility] Health Crisis Team - Child: [phone]    The treatment team has appreciated the opportunity to work with you.     If you have any questions or concerns our unit number is [phone]        Pending Results     Date and Time Order Name Status Description    11/1/2018 0030 Anti Nuclear Carmelita IgG by IFA with Reflex In process  " "   2018 0030 Ceruloplasmin In process     10/31/2018 0910 EKG 12-lead, complete Preliminary             Admission Information     Date & Time Provider Department Dept. Phone    10/28/2018 Gerber Koroma MD  [facility] [phone]      Your Vitals Were     Blood Pressure Pulse Temperature Respirations Weight Pulse Oximetry    126/82 112 97.7  F (36.5  C) (Tympanic) 16 64.2 kg (141 lb 8 oz) 97%    BMI (Body Mass Index)                   21.52 kg/m2           [employer] Information     [employer] lets you send messages to your doctor, view your test results, renew your prescriptions, schedule appointments and more. To sign up, go to [email or web address] . Click on \"Log in\" on the left side of the screen, which will take you to the Welcome page. Then click on \"Sign up Now\" on the right side of the page.     You will be asked to enter the access code listed below, as well as some personal information. Please follow the directions to create your username and password.     Your access code is: [email or web address]  Expires: 2019 12:16 PM     Your access code will  in 90 days. If you need help or a new code, please call your [place] clinic or [phone].        Care EveryWhere ID     This is your Care EveryWhere ID. This could be used by other organizations to access your [place] medical records  [id number]        Equal Access to Services     CORIN MORGAN AH: Hadii johnathon phippso Sokathrinali, waaxda luqadaha, qaybta kaalmada adeegyada, waxay augustin kessler adehai spencer . So [facility] [phone].    ATENCIÓN: Si habla español, tiene a mahoney disposición servicios gratuitos de asistencia lingüística. Llame al [phone].    We comply with applicable federal civil rights laws and [place] laws. We do not discriminate on the basis of race, color, national origin, age, disability, sex, sexual orientation, or gender identity.               Review of your medicines      START taking        Dose / Directions    LORazepam 1 MG " tablet   Commonly known as:  ATIVAN   Used for:  Catatonia associated with another mental disorder        Dose:  1 mg   Take 1 tablet (1 mg) by mouth 2 times daily   Quantity:  60 tablet   Refills:  0         CONTINUE these medicines which may have CHANGED, or have new prescriptions. If we are uncertain of the size of tablets/capsules you have at home, strength may be listed as something that might have changed.        Dose / Directions    OLANZapine 5 MG tablet   Commonly known as:  zyPREXA   This may have changed:  additional instructions        Dose:  5 mg   Take 1 tablet (5 mg) by mouth At Bedtime   Quantity:  30 tablet   Refills:  0         STOP taking     escitalopram 10 MG tablet   Commonly known as:  LEXAPRO                Where to get your medicines      These medications were sent to [facility] [place], [place] - [address]  [address] [address]     Phone:  [phone]     OLANZapine 5 MG tablet         Some of these will need a paper prescription and others can be bought over the counter. Ask your nurse if you have questions.     Bring a paper prescription for each of these medications     LORazepam 1 MG tablet                Protect others around you: Learn how to safely use, store and throw away your medicines at www.disposemymeds.org.             Medication List: This is a list of all your medications and when to take them. Check marks below indicate your daily home schedule. Keep this list as a reference.      Medications           Morning Afternoon Evening Bedtime As Needed    LORazepam 1 MG tablet   Commonly known as:  ATIVAN   Take 1 tablet (1 mg) by mouth 2 times daily   Last time this was given:  1 mg on 11/1/2018  8:36 AM                                OLANZapine 5 MG tablet   Commonly known as:  zyPREXA   Take 1 tablet (5 mg) by mouth At Bedtime   Last time this was given:  5 mg on 10/31/2018 10:00 PM

## 2018-10-28 NOTE — ED NOTES
"Patient verbalized \"I think I killed someone\"  When asked if she can provide any further details she indicated she thinks that is why we have her in a locked room.  Patient instructed she is in a locked unit for her safety.  "

## 2018-10-28 NOTE — ED PROVIDER NOTES
"  History     Chief Complaint:  Psychiatric evaluation    History limited by poor historian.  HPI   Bertha Gautam is a 28 year old female with a history of depression and psychosis who presents to the emergency department with her sister for a psychiatric evaluation. Of note, the patient's sister, whom she lives with, states that she has seemed more paranoid, distant, and disoriented at home. Yesterday, the patient called her and said that she needed to talk to her but would not describe what it was about and was acting very strange so she recommended that the patient come to the ED. The patient states that for the last several days she is feeling different but does not know how to describe it. She is unsure if it is depression but that she is feeling badly about \"stupid stuff\" that she has done in her life and regrets not listening to her close family and friends. She has not taken her medications since January. She states that she usually drinks \"a lot\" but has been drinking less in the last couple of weeks. The patient has never seen a psychiatrist or a therapist and denies use of marijuana or suicidal ideation.     Patient's sister: states that the patient is usually very high functioning, and works as a  at .  Patient is currently living with her sister. She usually jokes and is a pleasant conversationalist.  Their mother  3 months ago, and she is wondering if her symptoms could be related to this.    Allergies:  No Known Drug Allergies    Medications:    Lexapro  Zyprexa      Past Medical History:    Depression    Past Surgical History:    History reviewed. No pertinent past surgical history.    Family History:    History reviewed. No pertinent family history.    Social History:  Lives with her sister  Positive for alcohol use.   Marital Status:  Single      Review of Systems   Psychiatric/Behavioral: Positive for dysphoric mood. Negative for suicidal ideas.   All other systems " "reviewed and are negative.    Physical Exam   First Vitals:  BP: 132/73  Heart Rate: 87  Temp: 98  F (36.7  C)  Resp: 16  Height: 172.7 cm (5' 8\")  Weight: 65.8 kg (145 lb)  SpO2: 99 %      Physical Exam  Gen:  Sitting quietly on bed.    Eye:   Sclera non-injected.      Musculoskeletal:     Normal movement of all extremities without evidence for deficit.    Extremities:    No edema.  Atraumatic.      Skin:   Warm and dry.  No rash.    Neurologic:    Non-focal exam without asymmetric weakness or numbness.      Psych:    Appearance: awake, alert, disheveled, dressed in street clothes    Attitude: evasive    Eye Contact: poor   Mood: depressed    Affect: flat    Speech: mumbling, soft    Psychomotor Behavior: no evidence of tardive dyskinesia, dystonia, or tics    Thought Process: disorganized, answers questions after long pause   Thought Content: difficulty answering direct questions, but states she would not harm herself    Insight: poor    Judgment: poor    Emergency Department Course   Laboratory:  Alcohol breath test POCT: 0.0    Drug Abuse Screen 77 urine: pending    HCG Qualitative urine: pending    Interventions:  1333 Zyprexa 5 mg ODT    Emergency Department Course:  1240 Nursing notes and vitals reviewed. I performed an exam of the patient as documented above.     Medicine administered as documented above. This was sent to the lab for further testing, results above.    I personally reviewed the laboratory results with the Patient and answered all related questions prior to discharge.      Impression & Plan    Medical Decision Making:  This is a 28yoF with history of psychosis in 2/18, who presents today with disorganized thoughts, depression, and delusions.  At baseline, the patient is highly functioning.  She has been off her psychiatric medications for several months, and has not seen a psychiatrist since leaving the hospital.  She does not appear to be acutely intoxicated.  Utox is pending.  She declined " physical exam, but symptoms are very consistent with schizophrenia rather than an acute medical issue.  She has been medically cleared for transfer to [facility] pending utox and bed availability. Signed out to my colleague, Dr. Turner, pending transfer.  Diagnosis:    ICD-10-CM    1. Psychosis, unspecified psychosis type (H) F29        Disposition:  Admitted to [facility]  Scribe Disclosure:  I, Jorge Chan, am serving as a scribe on 10/28/2018 at 12:40 PM to personally document services performed by Dr. Rere MD based on my observations and the provider's statements to me.     Jorge Chan  10/28/2018    EMERGENCY DEPARTMENT       Barbi Jernigan MD  10/28/18 5556

## 2018-10-28 NOTE — ED NOTES
"Patient requested to move to a \"more open area\" transferred patient to [facility]. Patient states, \" Why am I here? I just thought I was coming to talk to someone.\" Spoke with patient, oriented her to her new room and to the  secure area. Offered food--patient declined. Patient drinking Ice water. Asked patient if she would change into scrubs and patient refused. This RN preformed a physical search of patient and security preformed a wanding of patient.   "

## 2018-10-28 NOTE — ED NOTES
Patient asking to leave.  Instructed patient that she is on a health officer hold and DEC will be in to see her.

## 2018-10-28 NOTE — IP AVS SNAPSHOT
[address] [address]    Phone:  [phone]                                       After Visit Summary   10/28/2018    Tony Gautam    MRN: [id number]           After Visit Summary Signature Page     I have received my discharge instructions, and my questions have been answered. I have discussed any challenges I see with this plan with the nurse or doctor.    ..........................................................................................................................................  Patient/Patient Representative Signature      ..........................................................................................................................................  Patient Representative Print Name and Relationship to Patient    ..................................................               ................................................  Date                                   Time    ..........................................................................................................................................  Reviewed by Signature/Title    ...................................................              ..............................................  Date                                               Time          22EPIC Rev 08/18

## 2018-10-28 NOTE — ED PROVIDER NOTES
Critical access hospital ED Behavioral Health Handoff Note:       Brief HPI:  This is a 28 year old female signed out to me by Dr. Pepper .  See initial ED Provider note for details of the presentation.     Patient is medically cleared for admission to a Behavioral Health unit.      Pending studies include UTox and hCG.      The patient is on a hold.  The type of hold is LAZARO/72hr.      The patient has required medication for agitation. But has taken Zyprexa x 1 for symptom control.    Exam:   Temp:  [98  F (36.7  C)] 98  F (36.7  C)  Heart Rate:  [87] 87  Resp:  [16] 16  BP: (132)/(73) 132/73  SpO2:  [99 %] 99 %  Patient resting calmly. No acute psychomotor agitation.    ED Course:    Patient awaiting transfer to [place] for inpatient psychiatric admission.    There were no significant events while under my care.      Patient was transferred in stable condition.      Impression:    ICD-10-CM    1. Psychosis, unspecified psychosis type (H) F29        Disposition:    Transferred to inpatient psychiatry      RESULTS:   Results for orders placed or performed during the hospital encounter of 10/28/18 (from the past 24 hour(s))   Alcohol breath test POCT     Status: Normal    Collection Time: 10/28/18  1:17 PM   Result Value Ref Range    Alcohol Breath Test 0.0 0.00 - 0.01             Rigo Avila MD  10/29/18 2759

## 2018-10-29 LAB
ALBUMIN SERPL-MCNC: 4.5 G/DL (ref 3.4–5)
ALP SERPL-CCNC: 56 U/L (ref 40–150)
ALT SERPL W P-5'-P-CCNC: 33 U/L (ref 0–50)
AMPHETAMINES UR QL SCN: NEGATIVE
ANION GAP SERPL CALCULATED.3IONS-SCNC: 7 MMOL/L (ref 3–14)
AST SERPL W P-5'-P-CCNC: 18 U/L (ref 0–45)
BARBITURATES UR QL: NEGATIVE
BASOPHILS # BLD AUTO: 0 10E9/L (ref 0–0.2)
BASOPHILS NFR BLD AUTO: 0.1 %
BENZODIAZ UR QL: NEGATIVE
BILIRUB SERPL-MCNC: 0.7 MG/DL (ref 0.2–1.3)
BUN SERPL-MCNC: 11 MG/DL (ref 7–30)
CALCIUM SERPL-MCNC: 9.2 MG/DL (ref 8.5–10.1)
CANNABINOIDS UR QL SCN: NEGATIVE
CHLORIDE SERPL-SCNC: 104 MMOL/L (ref 94–109)
CHOLEST SERPL-MCNC: 140 MG/DL
CO2 SERPL-SCNC: 27 MMOL/L (ref 20–32)
COCAINE UR QL: NEGATIVE
CREAT SERPL-MCNC: 0.8 MG/DL (ref 0.52–1.04)
DIFFERENTIAL METHOD BLD: NORMAL
EOSINOPHIL # BLD AUTO: 0 10E9/L (ref 0–0.7)
EOSINOPHIL NFR BLD AUTO: 0 %
ERYTHROCYTE [DISTWIDTH] IN BLOOD BY AUTOMATED COUNT: 11.8 % (ref 10–15)
ETHANOL UR QL SCN: NEGATIVE
FOLATE SERPL-MCNC: 17.4 NG/ML
GFR SERPL CREATININE-BSD FRML MDRD: 85 ML/MIN/1.7M2
GLUCOSE SERPL-MCNC: 105 MG/DL (ref 70–99)
HCG UR QL: NEGATIVE
HCT VFR BLD AUTO: 40.5 % (ref 35–47)
HDLC SERPL-MCNC: 53 MG/DL
HGB BLD-MCNC: 13.9 G/DL (ref 11.7–15.7)
IMM GRANULOCYTES # BLD: 0 10E9/L (ref 0–0.4)
IMM GRANULOCYTES NFR BLD: 0.3 %
LDLC SERPL CALC-MCNC: 79 MG/DL
LYMPHOCYTES # BLD AUTO: 1.5 10E9/L (ref 0.8–5.3)
LYMPHOCYTES NFR BLD AUTO: 16.5 %
MCH RBC QN AUTO: 28.8 PG (ref 26.5–33)
MCHC RBC AUTO-ENTMCNC: 34.3 G/DL (ref 31.5–36.5)
MCV RBC AUTO: 84 FL (ref 78–100)
MONOCYTES # BLD AUTO: 0.5 10E9/L (ref 0–1.3)
MONOCYTES NFR BLD AUTO: 5.2 %
NEUTROPHILS # BLD AUTO: 7.1 10E9/L (ref 1.6–8.3)
NEUTROPHILS NFR BLD AUTO: 77.9 %
NONHDLC SERPL-MCNC: 87 MG/DL
NRBC # BLD AUTO: 0 10*3/UL
NRBC BLD AUTO-RTO: 0 /100
OPIATES UR QL SCN: NEGATIVE
PCP UR QL SCN: NEGATIVE
PLATELET # BLD AUTO: 234 10E9/L (ref 150–450)
POTASSIUM SERPL-SCNC: 3.8 MMOL/L (ref 3.4–5.3)
PROT SERPL-MCNC: 8.5 G/DL (ref 6.8–8.8)
RBC # BLD AUTO: 4.83 10E12/L (ref 3.8–5.2)
SODIUM SERPL-SCNC: 138 MMOL/L (ref 133–144)
TRIGL SERPL-MCNC: 40 MG/DL
TSH SERPL DL<=0.005 MIU/L-ACNC: 0.63 MU/L (ref 0.4–4)
VIT B12 SERPL-MCNC: 712 PG/ML (ref 193–986)
WBC # BLD AUTO: 9.1 10E9/L (ref 4–11)

## 2018-10-29 PROCEDURE — 36415 COLL VENOUS BLD VENIPUNCTURE: CPT | Performed by: PSYCHIATRY & NEUROLOGY

## 2018-10-29 PROCEDURE — 85025 COMPLETE CBC W/AUTO DIFF WBC: CPT | Performed by: PSYCHIATRY & NEUROLOGY

## 2018-10-29 PROCEDURE — 81025 URINE PREGNANCY TEST: CPT | Performed by: STUDENT IN AN ORGANIZED HEALTH CARE EDUCATION/TRAINING PROGRAM

## 2018-10-29 PROCEDURE — 99223 1ST HOSP IP/OBS HIGH 75: CPT | Mod: AI | Performed by: PSYCHIATRY & NEUROLOGY

## 2018-10-29 PROCEDURE — 25000132 ZZH RX MED GY IP 250 OP 250 PS 637: Performed by: STUDENT IN AN ORGANIZED HEALTH CARE EDUCATION/TRAINING PROGRAM

## 2018-10-29 PROCEDURE — 80320 DRUG SCREEN QUANTALCOHOLS: CPT | Performed by: STUDENT IN AN ORGANIZED HEALTH CARE EDUCATION/TRAINING PROGRAM

## 2018-10-29 PROCEDURE — 87591 N.GONORRHOEAE DNA AMP PROB: CPT | Performed by: STUDENT IN AN ORGANIZED HEALTH CARE EDUCATION/TRAINING PROGRAM

## 2018-10-29 PROCEDURE — 82607 VITAMIN B-12: CPT | Performed by: PSYCHIATRY & NEUROLOGY

## 2018-10-29 PROCEDURE — 80307 DRUG TEST PRSMV CHEM ANLYZR: CPT | Performed by: STUDENT IN AN ORGANIZED HEALTH CARE EDUCATION/TRAINING PROGRAM

## 2018-10-29 PROCEDURE — 12400007 ZZH R&B MH INTERMEDIATE UMMC

## 2018-10-29 PROCEDURE — 82746 ASSAY OF FOLIC ACID SERUM: CPT | Performed by: PSYCHIATRY & NEUROLOGY

## 2018-10-29 PROCEDURE — 80053 COMPREHEN METABOLIC PANEL: CPT | Performed by: PSYCHIATRY & NEUROLOGY

## 2018-10-29 PROCEDURE — 84443 ASSAY THYROID STIM HORMONE: CPT | Performed by: PSYCHIATRY & NEUROLOGY

## 2018-10-29 PROCEDURE — 87491 CHLMYD TRACH DNA AMP PROBE: CPT | Performed by: STUDENT IN AN ORGANIZED HEALTH CARE EDUCATION/TRAINING PROGRAM

## 2018-10-29 PROCEDURE — 80061 LIPID PANEL: CPT | Performed by: PSYCHIATRY & NEUROLOGY

## 2018-10-29 RX ORDER — OLANZAPINE 5 MG/1
5 TABLET ORAL AT BEDTIME
Status: DISCONTINUED | OUTPATIENT
Start: 2018-10-29 | End: 2018-10-30

## 2018-10-29 RX ADMIN — OLANZAPINE 5 MG: 5 TABLET, FILM COATED ORAL at 20:26

## 2018-10-29 ASSESSMENT — ACTIVITIES OF DAILY LIVING (ADL)
GROOMING: INDEPENDENT
LAUNDRY: WITH SUPERVISION
ORAL_HYGIENE: INDEPENDENT
DRESS: INDEPENDENT

## 2018-10-29 NOTE — PROGRESS NOTES
----------------------------------------------------------------------------------------------------------  [facility], [place]   Psychiatric Progress Note  Hospital Day #1     Interim History:   The patient's care was discussed with the treatment team and chart notes were reviewed.    Sleep: per RN note, would benefit from sleep-promoting meds  Scheduled Medications: none  Staff Report: During admission interview, patient reported sometimes seeing and hearing voices but would not clarify. She appeared agitated, anxious, and depressed. No other staff notes present due to new patient.     Patient Interview: Patient was interviewed in the team room. Throughout interview, patient retained a guarded posture but did answer questions cooperatively. She expressed significant amount of guilt for hurting her loved ones and possibly unknown persons through her actions. Specifically, she stated that she often says things that hurt people. She reported that she's had sexual relations with men and worries that she has transmitted HSV infection to these individuals. She reported that she most often cannot bring herself to tell her sexual partners of her status beforehand. She stated that she is a horrible person multiple times and believes that people around her, including family, would be happy with her gone. She stated that she would like a pill to put her to sleep for a long time, and has had thoughts of dying for 7 years. Later in the interview, she did express hope for the future including having a happy relationship and getting .    She did endorse a problem with drinking to the point of blacking out, and having trouble limiting drinking in this regard. She denied a history of withdrawal or seizures.     The risks, benefits, alternatives and side effects of any medication changes have been discussed and are understood by the patient and other caregivers.    Review of systems:     ROS  was negative unless noted above.          Allergies:   No Known Allergies         Psychiatric Examination:   Pulse 83  Temp 98.2  F (36.8  C) (Oral)  Resp 16  Wt 65.8 kg (145 lb)  BMI 22.05 kg/m2  Weight is 145 lbs 0 oz  Body mass index is 22.05 kg/(m^2).    Appearance:  awake, alert, appeared as age stated, cooperative and casually dressed  Attitude:  cooperative and guarded  Eye Contact:  shifting  Mood:  depressed  Affect:  mood congruent, intensity is blunted, guarded and reactive  Speech:  clear, coherent, paucity of speech and increased speech latency  Psychomotor Behavior:  physical retardation  Thought Process:  Mildly disorganized at times although did have more lucid moments throughout interview where her thought processes were logical, linear, goal oriented.   Associations:  no loose associations  Thought Content:  active suicidal ideation present, thoughts of self-harm including overdosing with a pill, no auditory hallucinations present and no visual hallucinations present   Insight:  good  Judgment:  poor  Oriented to:  time, person, and place  Attention Span and Concentration:  intact  Recent and Remote Memory:  intact  Language: intact to conversation   Fund of Knowledge: appropriate  Muscle Strength and Tone: normal  Gait and Station: Normal         Labs:     Recent Results (from the past 24 hour(s))   CBC with platelets differential    Collection Time: 10/29/18  3:49 PM   Result Value Ref Range    WBC 9.1 4.0 - 11.0 10e9/L    RBC Count 4.83 3.8 - 5.2 10e12/L    Hemoglobin 13.9 11.7 - 15.7 g/dL    Hematocrit 40.5 35.0 - 47.0 %    MCV 84 78 - 100 fl    MCH 28.8 26.5 - 33.0 pg    MCHC 34.3 31.5 - 36.5 g/dL    RDW 11.8 10.0 - 15.0 %    Platelet Count 234 150 - 450 10e9/L    Diff Method Automated Method     % Neutrophils 77.9 %    % Lymphocytes 16.5 %    % Monocytes 5.2 %    % Eosinophils 0.0 %    % Basophils 0.1 %    % Immature Granulocytes 0.3 %    Nucleated RBCs 0 0 /100    Absolute Neutrophil 7.1  1.6 - 8.3 10e9/L    Absolute Lymphocytes 1.5 0.8 - 5.3 10e9/L    Absolute Monocytes 0.5 0.0 - 1.3 10e9/L    Absolute Eosinophils 0.0 0.0 - 0.7 10e9/L    Absolute Basophils 0.0 0.0 - 0.2 10e9/L    Abs Immature Granulocytes 0.0 0 - 0.4 10e9/L    Absolute Nucleated RBC 0.0    Comprehensive metabolic panel    Collection Time: 10/29/18  3:49 PM   Result Value Ref Range    Sodium 138 133 - 144 mmol/L    Potassium 3.8 3.4 - 5.3 mmol/L    Chloride 104 94 - 109 mmol/L    Carbon Dioxide 27 20 - 32 mmol/L    Anion Gap 7 3 - 14 mmol/L    Glucose 105 (H) 70 - 99 mg/dL    Urea Nitrogen 11 7 - 30 mg/dL    Creatinine 0.80 0.52 - 1.04 mg/dL    GFR Estimate 85 >60 mL/min/1.7m2    GFR Estimate If Black >90 >60 mL/min/1.7m2    Calcium 9.2 8.5 - 10.1 mg/dL    Bilirubin Total 0.7 0.2 - 1.3 mg/dL    Albumin 4.5 3.4 - 5.0 g/dL    Protein Total 8.5 6.8 - 8.8 g/dL    Alkaline Phosphatase 56 40 - 150 U/L    ALT 33 0 - 50 U/L    AST 18 0 - 45 U/L   Lipid panel    Collection Time: 10/29/18  3:49 PM   Result Value Ref Range    Cholesterol 140 <200 mg/dL    Triglycerides 40 <150 mg/dL    HDL Cholesterol 53 >49 mg/dL    LDL Cholesterol Calculated 79 <100 mg/dL    Non HDL Cholesterol 87 <130 mg/dL   TSH with free T4 reflex and/or T3 as indicated    Collection Time: 10/29/18  3:49 PM   Result Value Ref Range    TSH 0.63 0.40 - 4.00 mU/L          Assessment    Diagnostic Impression: Ms. Gautam is a 28-year-old  at [employer], with history of depression, possible alcohol use disorder, and possible cannabis use disorder who presented to the ED with psychosis including symptoms of paranoia and disorganizaiton. Mental status exam was significant for possible RTIS, disorientation, blunted affect, speech latency, illogical and tangential speech, and disorganization. Due to the description of her relatively high functioning job, and family's collateral of her functional status having a significant deterioration over the past 36 hours, it  seems that her psychosis may be secondary to substance use. Major depressive disorder with psychotic features, or a primary psychotic disorder (schizophrenia vs schizoaffective disorder) are also possibilities at this time.     Hospital course: Tony Gautam was admitted to station 22 on a 72 hour hold on 10/28/18 and started on Olanzapine 5mg at bedtime. Escitalopram 10mg/day was also given at her prior hospitalization but this was held due to the patient not currently taking it. Behavioral PRN's of Olanzepine 10 mg PO/IM PRN for agitation, and hydroxyzine 25-50 mg PO Q4H PRN for anxiety were started. Trazadone 50 mg oral bedtime PRN for sleep was also made available.    Discontinued Medications (& Rationale):   - Held Escitalopram due to patient not taking this medication PTA.    Medical course: none    Plan   Principal Diagnosis:   #Psychosis NOS (MDD with psychotic features vs brief psychotic disorder vs substance induced psychosis)    Secondary psychiatric diagnoses of concern this admission:   # alcohol use disorder  # cannabis use disorder    Psychotropic Medications:  Modify:  - Add Olanzapine 5mg at bedtime   - Hold Lexapro, as this was given at her last admission but she has not been taking it.     Patient will be treated in therapeutic milieu with appropriate individual and group therapies as described.      Medical diagnoses to be addressed this admission:    # none    Data: See labs above  Consults: none    Legal Status: 72-h Hold signed on 10/28/18    Safety Assessment:   Behavioral Orders   Procedures     Code 1 - Restrict to Unit     Elopement precautions     Routine Programming     As clinically indicated     Status 15     Every 15 minutes.     Suicide precautions     Patients on Suicide Precautions should have a Combination Diet ordered that includes a Diet selection(s) AND a Behavioral Tray selection for Safe Tray - with utensils, or Safe Tray - NO utensils         Disposition: pending  medication management anticipate 1-2 weeks     Aylin Woody, MS3 scribed for Anthony Torres MD    I was present with the medical student who participated in the service and documentation of the note. I have verified the history and personally performed the physical/mental status exam and medical decision making. I agree with the assessment and plan documented in the note.     Anthony Torres MD  PGY-1 Psychiatry Resident    Attestation:  I, Gerber Koroma, have personally performed an examination of this patient and I have reviewed the resident's documentation.  I have edited the note to reflect all relevant changes.  I have discussed this patient with the house staff on 10/29/2018.  I agree with resident findings and plan in today's note and yesterdays resident H&P.  I have reviewed all vitals and laboratory findings.      I certifiy that the inpatient services were ordered in accordance with the Medicare regulations governing the order. This includes certification that hospital inpatient services are reasonable and necessary and in the case of services not specified as inpatient-only under 42 .22(n), that they are appropriately provided as inpatient services in accordance with the 2-midnight benchmark under 42 .3(e).     The reason for inpatient status is Major Depression and Suicidal Ideation and/or Behavior.    Gerber Koroma

## 2018-10-29 NOTE — PLAN OF CARE
"Problem: Mental State/Mood Impairment (Psychotic Signs/Symptoms) (Adult)  Goal: Improved Mental State/Mood (Psychotic Signs/Symptoms)  Outcome: No Change  Patient denied wishing to be dead, or thinking about ending her life now. She said she has been hearing people talking and seeing things, but declined to give more details. Patient admitted that she has been feeling paranoid, but declined to talk more about it. Patient contracted for safety, including not hurting self or others on the unit, and not leaving the unit until discharged.   Patient appeared stressed, anxious, and restless. She got up at the very beginning of the interview, said she needed to go to the bathroom, she also got up in two more occasions, wanting to leave. Patient's thoughts were disorganized, she appeared to be responding to voices, as in few occasions she answered questions with unrelated content, example: \"I told them both\", \"They are talking at work\", \"I worked as a  at [employer]\". When asked did she mean [employer], she said:  \"I made it up\".  Patient answered some questions, gave unrelated answers, or declined to answer at all. The interview was terminated by patient, after she got up, said she needs to go, and left the room. Admission is not completed at this time.   Patient will benefit from sleep promoting medications. Patient needs to be monitored for elopement, as she appeared to be impulsive and disorganized. She will need to be re approached at later time with completion of her admission.         "

## 2018-10-29 NOTE — PLAN OF CARE
Problem: Patient Care Overview  Goal: Team Discussion  Team Plan:   BEHAVIORAL TEAM DISCUSSION    Participants:   Tanja Herrera, [facility], Surinder Koroma MD, Lety Chi RN  Progress: Minimal, just admitted  Continued Stay Criteria/Rationale: Pt is very psychotic  Medical/Physical: Per Internal Medicine Copnsult  Precautions:   Behavioral Orders   Procedures     Code 1 - Restrict to Unit     Elopement precautions     Routine Programming     As clinically indicated     Status 15     Every 15 minutes.     Suicide precautions     Patients on Suicide Precautions should have a Combination Diet ordered that includes a Diet selection(s) AND a Behavioral Tray selection for Safe Tray - with utensils, or Safe Tray - NO utensils       Plan: Psychiatric Evaluation, stabilization, medication management, milieu management and [facility] aftercare planning.  Rationale for change in precautions or plan: no changes.

## 2018-10-29 NOTE — H&P
"History and Physical    Bertha Gautam MRN# [id number]   Age: 28 year old YOB: 1990     Date of Admission:  10/28/2018          Contacts:   Primary Outpatient Psychiatrist: none   Primary Physician:  No Ref-Primary, Physician  Therapist: none  Family Members: Alicia Gautam (sister); phone number for her was not available at time of interview         Chief Complaint:   \"Everyone is against me\"         History of Present Illness:   History obtained from patient interview, chart review.  Pt interviewed on 10/28/2018 at approximately 8:30PM.    This patient is a 28 year old female with a history of depression, cannabis use disorder, and alcohol use disorder, in remission who presented to the [facility] ED on 10/28/2018 with her sister due to disorganization and paranoia.   She was medically cleared for admission to inpatient psychiatric unit.    Per ED report:  Per patient's sister who patient lives with, Bertha has seemed more paranoid, distant, and disoriented at home since last night. She notes that Bertha has commented that she is worried she is going to die and people are out to get her. Sister observed her \"staring off out of a window last night and wanting to go outside for no reason.\" Bertha has reportedly had moments of not responding and talking in incomplete sentences. Sister noted that Bertha was hospitalized in 2/2018 and placed on medications for one month. As far as she knows, Bertha was not supposed to continue these medications longer and therefore Bertha has not had any psychiatric medications since February.     In the ED, Bertha reported feeling different but could not elaborate. She was unsure if it was depression or her feeling badly about \"stupid stuff\" she has done in her life. She expressed regret for not listening to close family and friends. She has been medication nonadherent since January 2018. She usually drinks \"a lot\" but has been drinking less in the " "last couple of weeks. She requested to leave the ED on several occasions. Per patient's sister, Bertha is usually a high functioning  at  and is a pleasant conversationalist. Their mother  3 months ago. Despite several attempts by ED staff, Bertha remained too disorganized to provide a urine sample for UDS and urine pregnancy test. She received Olanzapine x1 for symptom control. Bertha informed the DEC  that she would speak if the cameras were turned off, pointing to the blood pressure monitor.     Per patient report:    Patient was extremely guarded during interview while also exhibiting significant response delay to any question. She stated that \"everyone is against me\", at least in part apparently because she says things that aren't how she means them to sound. She stated as an example that one time she used the word \"karma\" and then stated \"but that's not how I mean to be.\" She stated that this has been going on for around a year and a half. Patient ultimately answered \"I don't know\" or \"I don't know what you want me to say\" to most questions. She asked why writer was taking notes multiple times, even though it was explained to her each time. She also stated the belief that writer wasn't accurately writing what she was saying, but seemed redirectable on this once writer read back verbatim what she had just said. Review of systems was largely not possible as patient either shut down or stated \"I don't know\" to the vast majority of questions. She signed AMARJIT for treatment team to talk with her father and sister.     Talking with patient's father, he noticed she was \"all over the place\" during a conversation on the phone last night but otherwise she had been doing okay to his knowledge. As far as he is aware current decompensation has been over the past 24-36 hours. Patient has no history of gee or other episodes other than the one at [facility] in February. He acknowledged " patient has a history of alcohol use but has really cut back recently. To the best of his knowledge patient still has a job working with a company aligned with [employer], though patient stated she no longer worked there. Father stated that patient's twin sister Alicia, who she lives with, would be a good source of information as well.    Per chart review:  Bertha was hospitalized at [facility] in 2018 for worsening depressive symptoms, passive SI, paranoia, and thought blocking in the setting of cannabis use.      The risks, benefits, alternatives and side effects have been discussed and are understood by the patient and other caregivers.       Psychiatric Review of Systems:   Extremely limited due to patient's mental status.  Psychosis:   Reports: Paranoia, possible ideas of reference  Denies: Visual and auditory hallucinations, thought broadcasting       Medical Review of Systems:   The Review of Systems is negative other than noted in the HPI         Psychiatric History:     Prior diagnoses: MDD    Hospitalizations: one at [facility] in 2018    Suicide attempts: Denies    Self-injurious behavior: Denies    Violence: Unknown    ECT/TMS: Unknown    Past medications: Lexapro, Zyprexa         Substance Use History:     Nicotine: None per chart    Alcohol: began drinking heavily in senior year of HS and continued until shortly before her 2018 hospitalization. Per father, has cut down significantly from previous use.        Cannabis: History of cannabis use per chart, patient did not disclose details during interview    Others: Unknown    Prior CD treatments: None per chart         Past Medical History:   No past medical history on file.   No past surgical history on file. Per records, had an  x1  No History of seizures or head trauma.       Allergies:    No Known Allergies       Medications:     Current Outpatient Prescriptions   Medication Sig Dispense Refill     escitalopram (LEXAPRO) 10 MG  "tablet Take 1 tablet (10 mg) by mouth daily 30 tablet 0     OLANZapine (ZYPREXA) 5 MG tablet Take 1 tablet (5 mg) by mouth At Bedtime Take 1 tablet (5 mg) orally at bedtime for 1 week, then  Take 2 tablets (10 mg) orally at bedtime 30 tablet 0           Social History:   Per 1/23/18 H&P by Dr. Monzon (Updates in Bold):   The patient reports she was born in [place], [place].  She claims she was raised by her biological parents but declined to respond about her siblings.  It is unclear at what point she was adopted. She lives with her twin sister in [place].  She reports she graduated from [employer] in [place].  She attended 1 year at [employer].  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 Today, she denies that she works there any longer, though dad states she does, but she is unable to say when she stopped working there and actually asked if documentation of writer's interview with her would go to her \"work\" during the interview.   She denies  or criminal history.          Family History:   Per records, significant alcohol use disorder in biological family. Patient adopted.     No family history on file.         Labs:     Recent Results (from the past 24 hour(s))   We2srvop breath test POCT    Collection Time: 10/28/18  1:17 PM   Result Value Ref Range    Alcohol Breath Test 0.0 0.00 - 0.01            Psychiatric Examination:     /73  Temp 98  F (36.7  C) (Temporal)  Resp 16  Ht 1.727 m (5' 8\")  Wt 65.8 kg (145 lb)  SpO2 99%  BMI 22.05 kg/m2    Appearance:  awake, dressed in hospital scrubs and appeared as age stated, at times appears disoriented  Attitude:  Extremely guarded  Eye Contact:  Intense and staring at times, other times looking down or around the room  Mood:  \"I don't know how to feel\"  Affect:  intensity is blunted and guarded  Speech:  Significant latency of " speech  Psychomotor Behavior:  no evidence of tardive dyskinesia, dystonia, or tics and intact station, gait and muscle tone  Thought Process:  disorganized, illogical and tangental  Associations:  no loose associations  Thought Content:  no evidence of suicidal ideation or homicidal ideation and denies AH, VH but endorses significant paranoia  Insight:  limited  Judgment:  poor  Oriented to:  Not formally assessed  Attention Span and Concentration:  limited  Recent and Remote Memory:  poor  Language:  english with appropriate syntax and vocabulary  Fund of Knowledge: Seems normal despite clear departure from her baseline  Muscle Strength and Tone: normal  Gait and Station: Normal         Physical Exam:     See ED assessment note by Dr. Jernigan on 10/28/2018        Assessment   Bertha Gautam is a 28 year old female with a history of MDD with psychosis versus substance induced psychotic disorder presented to the [facility] ED 10/28/2018 following worsening paranoia and disorganization. The patient's last psychiatric hospitalization was in February 2018.  The patient is not currently followed by a psychiatrist. Family history is unknown, patient is adopted. Current psychosocial stressors include her mother's passing in July, possible work stressors (unclear if she is currently employed). Due to patient's mental status a clear substance use history was not able to be obtained, but per chart review there is alcohol and cannabis use history. The MSE is notable for very paranoid and guarded young woman with significant latency of speech when responding to all questions, though she doesn't clearly endorse any specific psychotic symptom beyond paranoia. The patient's reported symptoms of disorganization and paranoia are consistent with historical diagnosis of MDD with psychotic features versus substance induced psychotic disorder. Given the apparent time course (per family patient was doing well no more than 36 hours ago)  substance induced psychotic disorder is highest on differential at this time. Urine drug screen remains pending. Given that she is disorganized and unable to care for herself, patient warrants inpatient psychiatric hospitalization to maintain her safety. Disposition pending clinical stabilization, medication optimization and development of an appropriate discharge plan.        Plan   Admit to Unit 22 with Attending Physician Dr. Koroma  Legal Status: 72-h Hold signed on 10/28 at 1425    Safety Assessment:   Behavioral Orders   Procedures     Status Continuous Sight     Constant visual observations     Pt has not required locked seclusion or restraints in the past 24 hours to maintain safety, please refer to RN documentation for further details.    Precautions: Suicide, Elopement    Principal psychiatric diagnosis: Unspecified Schizophrenia Spectrum and Other Psychotic Disorder  R/o MDD with psychotic features  R/o Substance Induced Psychotic Disorder  R/o Brief Psychotic Disorder    Secondary psychiatric diagnoses:   # Cannabis Use Disorder  # Alcohol Use Disorder    Medications:   Outpatient medications held:  None      Outpatient medications continued: None    New medications initiated:   Olanzapine 10mg PO/IM q2H PRN agitation  Hydroxyzine 25-50mg PO q4H PRN anxiety  Acetaminophen 650mg PO q4H PRN mild-moderate pain    - Patient will be treated in therapeutic milieu with appropriate individual and group therapies.  - medications as above    Medical diagnoses:  None    Consult: None  Labs: CBC, CMP, Utox, Folate, Hematocrit, Urine HCG, Lipid Panel, TSH with free T4 reflex, Vitamin B12    Relevant psychosocial stressors: Death of mother in July     Dispo: unknown pending medication management and clinical stabilization    -------------------------------------------------------  Patient to be seen and staffed by attending physician, Dr. Koroma, in the morning.    Attestation:    I have seen and evaluated this  patient.  Hussein Tabor MD PGY-2    Attestation:  For attending attestation statement, see progress note dated October 29, 2018. Gerber Koroma MD

## 2018-10-29 NOTE — PROGRESS NOTES
"Patient is preoccupied and afraid she has \"messed up to badly\". She she expresses concerns about her family's safety. \"I have hurt a lot of people\" \"I just want to make sure my family is safe\"   Patient expressed \"I know I'm going to die here....even if I leave here I will die\" \"My path is laid out for me already\"  \"I want to share my story but I don't think anyone will care enough\" \"I want someone to tell me what they would do if they were me\"   Writer expressed to the client all staff here want to help her but her story is hers to share with whomever she chooses when she chooses.   Client expressed some of the things she has done \"I said things I shouldn't have said to people\" \"I went out and partied and didn't care about others well being\" \"I want to apologize but don't know how to start\"     Interview was cut short so client could meet with a  and the treatment team. Will continue to monitor.   "

## 2018-10-29 NOTE — PROGRESS NOTES
"     10/28/18 2224   Patient Belongings   Did you bring any home meds/supplements to the hospital?  No   Patient Belongings cell phone/electronics;clothing;money (see comment);keys;purse;watch   Disposition of Belongings Locker   Belongings Search Yes   Clothing Search Yes     Pt has black sweat pants, gray long sleeve shirt, black dress pants, blue and white shirt, underwear, black pair of socks, pair black flats, blue jeans, gray cardigan, maroon sports bra, black and gray tote bag, brown watch, planner, various lip glosses and lipsticks, perfume, keys, \"Zuleyka\" cellphone, blue heaphones, $2 cash, and various change. A Mastercard ending in [id number] and a [employer] checkbook sent to security in envelope [id number].    A               Admission:  I am responsible for any personal items that are not sent to the safe or pharmacy.  [facility] is not responsible for loss, theft or damage of any property in my possession.    Signature:  _________________________________ Date: _______  Time: _____                                              Staff Signature:  ____________________________ Date: ________  Time: _____      2nd Staff person, if patient is unable/unwilling to sign:    Signature: ________________________________ Date: ________  Time: _____     Discharge:  [facility] has returned all of my personal belongings:    Signature: _________________________________ Date: ________  Time: _____                                          Staff Signature:  ____________________________ Date: ________  Time: _____         "

## 2018-10-29 NOTE — PROGRESS NOTES
"Pt briefly entered the group room during the afternoon OT group. Shortly after arriving, she stated \"I don't think I can do this. I'm sorry, I'm going to excuse myself.\" Continue to encourage group attendance and participation. Pt will be given self-assessment form, and OT staff will explain the purpose of including them in their treatment plan and offer options for meeting their needs.    "

## 2018-10-29 NOTE — PROGRESS NOTES
Writer consulted with Lety Chi RN, Pt is too psychotic and paranoid to participate in meaningful psychosocial interview.  Recommend CTC will attempt to complete tomorrow.

## 2018-10-29 NOTE — PROGRESS NOTES
"CLINICAL NUTRITION SERVICES - ASSESSMENT NOTE     Nutrition Prescription    RECOMMENDATIONS FOR MDs/PROVIDERS TO ORDER:  1. If suspect inadequate po intakes, consider ordering Boost Plus with meals.  2. Obtain BMP    Malnutrition Status:    Patient does not meet two of the criteria necessary for diagnosing malnutrition     Recommendations already ordered by Registered Dietitian (RD):  None today    Future/Additional Recommendations:  1. Pending BMP, consider need to initiate micronutrient supplements.  2. Monitor po intakes and wt trends and consider need for scheduled snacks/supplements pending findings.     REASON FOR ASSESSMENT  Tony Gautam is a/an 28 year old female assessed by the dietitian for Admission Nutrition Risk Screen for reduced oral intake over the last month    NUTRITION HISTORY  Tony reports no food allergies or intolerances. Reports she had been eating well prior to admission, with decreased intakes only for one day prior to admission. She usually eats 2-3 meals per day.   Per  H&P, history of depression, cannabis use disorder, alcohol use disorder, in remission who presents to the [facility] ED on 10/28 with her sister due to disorganization and paranoia.    CURRENT NUTRITION ORDERS  Diet: Regular  Intake/Tolerance: Reports she did not have breakfast this morning d/t not feeling very hungry. When writer asked if she planned to eat lunch, she took a few seconds to answer and just shrugged her shoulders. She denies any N/V, constipation, or diarrhea. Discussed lunch intakes with RN later - pt did eat lunch.    LABS  No BMP available at this time    MEDICATIONS reviewed    ANTHROPOMETRICS  Height: 0 cm (Data Unavailable)  Ht Readings from Last 1 Encounters:   10/28/18 1.727 m (5' 8\")   Most Recent Weight: 65.8 kg (145 lb)    IBW: 63.6 kg (103% IBW)  BMI: Normal BMI  Weight History: Wt appears to be stable since 9 months ago. No wt information available in CareEverywhere at this time.  Wt " Readings from Last 10 Encounters:   10/28/18 65.8 kg (145 lb)   10/28/18 65.8 kg (145 lb)   01/23/18 67 kg (147 lb 12.8 oz)     Dosing Weight: 66 kg (actual)    ASSESSED NUTRITION NEEDS  Estimated Energy Needs: 3541-3635 kcals/day (25 - 30 kcals/kg)  Justification: Maintenance  Estimated Protein Needs: 53-66 grams protein/day (0.8 - 1 grams of pro/kg)  Justification: Maintenance  Estimated Fluid Needs: 1 mL/kcal, or per provider  Justification: Maintenance    PHYSICAL FINDINGS  See malnutrition section below.     MALNUTRITION  % Intake: Decreased intake does not meet criteria  % Weight Loss: None noted  Subcutaneous Fat Loss: None observed  Muscle Loss: None observed  Fluid Accumulation/Edema: None noted  Malnutrition Diagnosis: Patient does not meet two of the above criteria necessary for diagnosing malnutrition    NUTRITION DIAGNOSIS  Predicted inadequate nutrient intake (protein-energy) related to variable appetite as evidenced by reliance on po intakes to meet 100% of estimated needs.      INTERVENTIONS  Implementation  Nutrition Education: Encouraged pt to eat lunch, working to eat at least 75% of meals TID + snacks available on unit.    Collaboration with RN regarding lunch intakes    Goals  Patient to consume % of nutritionally adequate meal trays TID, or the equivalent with supplements/snacks.     Monitoring/Evaluation  Progress toward goals will be monitored and evaluated per protocol.    Margarita Parikh RD, TWYLA  Unit pgr: [phone]

## 2018-10-30 LAB
C TRACH DNA SPEC QL NAA+PROBE: NEGATIVE
HIV 1+2 AB+HIV1 P24 AG SERPL QL IA: NONREACTIVE
N GONORRHOEA DNA SPEC QL NAA+PROBE: NEGATIVE
SPECIMEN SOURCE: NORMAL
SPECIMEN SOURCE: NORMAL

## 2018-10-30 PROCEDURE — 12400007 ZZH R&B MH INTERMEDIATE UMMC

## 2018-10-30 PROCEDURE — G0177 OPPS/PHP; TRAIN & EDUC SERV: HCPCS

## 2018-10-30 PROCEDURE — 25000132 ZZH RX MED GY IP 250 OP 250 PS 637: Performed by: STUDENT IN AN ORGANIZED HEALTH CARE EDUCATION/TRAINING PROGRAM

## 2018-10-30 PROCEDURE — 86780 TREPONEMA PALLIDUM: CPT | Performed by: STUDENT IN AN ORGANIZED HEALTH CARE EDUCATION/TRAINING PROGRAM

## 2018-10-30 PROCEDURE — 99232 SBSQ HOSP IP/OBS MODERATE 35: CPT | Mod: GC | Performed by: PSYCHIATRY & NEUROLOGY

## 2018-10-30 PROCEDURE — 36415 COLL VENOUS BLD VENIPUNCTURE: CPT | Performed by: STUDENT IN AN ORGANIZED HEALTH CARE EDUCATION/TRAINING PROGRAM

## 2018-10-30 PROCEDURE — 87389 HIV-1 AG W/HIV-1&-2 AB AG IA: CPT | Performed by: STUDENT IN AN ORGANIZED HEALTH CARE EDUCATION/TRAINING PROGRAM

## 2018-10-30 RX ORDER — MIRTAZAPINE 15 MG/1
15 TABLET, FILM COATED ORAL AT BEDTIME
Status: DISCONTINUED | OUTPATIENT
Start: 2018-10-30 | End: 2018-11-01 | Stop reason: HOSPADM

## 2018-10-30 RX ORDER — OLANZAPINE 7.5 MG/1
7.5 TABLET, FILM COATED ORAL AT BEDTIME
Status: DISCONTINUED | OUTPATIENT
Start: 2018-10-30 | End: 2018-10-31

## 2018-10-30 RX ORDER — LORAZEPAM 2 MG/1
2 TABLET ORAL ONCE
Status: DISCONTINUED | OUTPATIENT
Start: 2018-10-30 | End: 2018-10-31 | Stop reason: CLARIF

## 2018-10-30 RX ORDER — ONDANSETRON 4 MG/1
4 TABLET, ORALLY DISINTEGRATING ORAL EVERY 6 HOURS PRN
Status: DISCONTINUED | OUTPATIENT
Start: 2018-10-30 | End: 2018-11-01 | Stop reason: HOSPADM

## 2018-10-30 RX ADMIN — OLANZAPINE 10 MG: 10 TABLET, FILM COATED ORAL at 05:52

## 2018-10-30 RX ADMIN — TRAZODONE HYDROCHLORIDE 50 MG: 50 TABLET ORAL at 00:30

## 2018-10-30 ASSESSMENT — ACTIVITIES OF DAILY LIVING (ADL)
HYGIENE/GROOMING: INDEPENDENT
DRESS: INDEPENDENT
DRESS: INDEPENDENT
ORAL_HYGIENE: INDEPENDENT
ORAL_HYGIENE: INDEPENDENT
HYGIENE/GROOMING: INDEPENDENT
LAUNDRY: WITH SUPERVISION

## 2018-10-30 NOTE — PROGRESS NOTES
"    ----------------------------------------------------------------------------------------------------------  [facility], [place]   Psychiatric Progress Note  Hospital Day #2     Interim History:   The patient's care was discussed with the treatment team and chart notes were reviewed.    Sleep: 1.5 hrs   Scheduled Medications: olanzepine 5 mg PO HS  PRN's: Trazodone 50 mg at 12:30am  Staff Report:  She had significant difficulty sleeping last night do to anxious ruminations and need for reassurance that she was safe. She was out of bed at the nurses' workstation multiple times throughout the night, appearing paranoid and hypervigilant. She briefly entered the room for afternoon group OT therapy but left immediately stating \"I don't think I can do this, I'm sorry\".     Patient Interview: Patient was interviewed in the team room. Interview was limited in part due to patient's slowness in responding and disorganized thought processes. She stated that she was unable to sleep last night due to ruminations on her feelings of guilt, anxiety, and worry that she was not safe. She reported that she may have been hallucinating as she saw a flashing behind her eyes. She was worried \"they were trying to play a trick on me\" which seemed to be in reference to the men that fixed the ice machine.  She reported that her visit with the  yesterday was helpful but didn't get enough time to talk. She endorsed a continued feeling that she is near the end of her life.  When asked if she has hope for the future, she stated that \"the only one standing in the way is myself\" in regards to getting better. She reported that she is having difficulty making decisions. She reported that she thought the medications she took during her last hospital admission in January were helpful, but she did not continue to take them as outpatient and did not know why.     Patient's father and sister were contacted on the " phone today for collateral history. They reported that for a few weeks prior to admission, she became more quiet and withdrawn, less willing to spend time with her sister. They attribute this to increased stress from her new position at work.  They also reported that her mother passed away in July. The sister stated that there may be relationship issue with her on and off again boyfriend of 6 years. They stated that they think that the combination of mother's passing, new position, and relationship difficulties could be contributing to her behavior change.  They did not know if she was using illicit drugs. They reported she had taken spice for the first time in January, precipitating her last hospitalization, but doubt she would take that again based on how badly her last reaction was.     The risks, benefits, alternatives and side effects of any medication changes have been discussed and are understood by the patient and other caregivers.    Review of systems:     ROS was negative unless noted above.          Allergies:   No Known Allergies         Psychiatric Examination:   Pulse 83  Temp 98.2  F (36.8  C) (Oral)  Resp 16  Wt 65.8 kg (145 lb)  BMI 22.05 kg/m2  Weight is 145 lbs 0 oz  Body mass index is 22.05 kg/(m^2).    Appearance:  appeared as age stated, awake, fatigued, eyes closed during parts of the interview, cooperative and casually dressed  Attitude:  cooperative and guarded  Eye Contact:  could barely keep eyes open for beginning of interview; when open, eyes shifting   Mood:  depressed  Affect:  mood congruent, intensity is blunted, guarded and reactive  Speech:  clear, coherent, paucity of speech and increased speech latency  Psychomotor Behavior:  physical retardation  Thought Process:  Mildly disorganized at times although did have more lucid moments throughout interview where her thought processes were logical, linear, goal oriented. Consistent with prior interview.   Associations:  no loose  "associations  Thought Content:  Passive SI present, feeling like she is at the end of her life. Denied AH, unclear if endorsed VH of lights when she shut her eyes around the time people came to fix the ice machine. Some evidence of paranoid delusions of the people fixing the ice machine as \"people trying to play a trick me\".  Insight:  limited  Judgment:  poor  Oriented to:  time, person, and place  Attention Span and Concentration:  intact  Recent and Remote Memory:  intact  Language: intact to conversation   Fund of Knowledge: appropriate  Muscle Strength and Tone: normal  Gait and Station: Normal         Labs:     Recent Results (from the past 24 hour(s))   CBC with platelets differential    Collection Time: 10/29/18  3:49 PM   Result Value Ref Range    WBC 9.1 4.0 - 11.0 10e9/L    RBC Count 4.83 3.8 - 5.2 10e12/L    Hemoglobin 13.9 11.7 - 15.7 g/dL    Hematocrit 40.5 35.0 - 47.0 %    MCV 84 78 - 100 fl    MCH 28.8 26.5 - 33.0 pg    MCHC 34.3 31.5 - 36.5 g/dL    RDW 11.8 10.0 - 15.0 %    Platelet Count 234 150 - 450 10e9/L    Diff Method Automated Method     % Neutrophils 77.9 %    % Lymphocytes 16.5 %    % Monocytes 5.2 %    % Eosinophils 0.0 %    % Basophils 0.1 %    % Immature Granulocytes 0.3 %    Nucleated RBCs 0 0 /100    Absolute Neutrophil 7.1 1.6 - 8.3 10e9/L    Absolute Lymphocytes 1.5 0.8 - 5.3 10e9/L    Absolute Monocytes 0.5 0.0 - 1.3 10e9/L    Absolute Eosinophils 0.0 0.0 - 0.7 10e9/L    Absolute Basophils 0.0 0.0 - 0.2 10e9/L    Abs Immature Granulocytes 0.0 0 - 0.4 10e9/L    Absolute Nucleated RBC 0.0    Comprehensive metabolic panel    Collection Time: 10/29/18  3:49 PM   Result Value Ref Range    Sodium 138 133 - 144 mmol/L    Potassium 3.8 3.4 - 5.3 mmol/L    Chloride 104 94 - 109 mmol/L    Carbon Dioxide 27 20 - 32 mmol/L    Anion Gap 7 3 - 14 mmol/L    Glucose 105 (H) 70 - 99 mg/dL    Urea Nitrogen 11 7 - 30 mg/dL    Creatinine 0.80 0.52 - 1.04 mg/dL    GFR Estimate 85 >60 mL/min/1.7m2    GFR " Estimate If Black >90 >60 mL/min/1.7m2    Calcium 9.2 8.5 - 10.1 mg/dL    Bilirubin Total 0.7 0.2 - 1.3 mg/dL    Albumin 4.5 3.4 - 5.0 g/dL    Protein Total 8.5 6.8 - 8.8 g/dL    Alkaline Phosphatase 56 40 - 150 U/L    ALT 33 0 - 50 U/L    AST 18 0 - 45 U/L   Lipid panel    Collection Time: 10/29/18  3:49 PM   Result Value Ref Range    Cholesterol 140 <200 mg/dL    Triglycerides 40 <150 mg/dL    HDL Cholesterol 53 >49 mg/dL    LDL Cholesterol Calculated 79 <100 mg/dL    Non HDL Cholesterol 87 <130 mg/dL   TSH with free T4 reflex and/or T3 as indicated    Collection Time: 10/29/18  3:49 PM   Result Value Ref Range    TSH 0.63 0.40 - 4.00 mU/L   Folate    Collection Time: 10/29/18  3:49 PM   Result Value Ref Range    Folate 17.4 >5.4 ng/mL   Vitamin B12    Collection Time: 10/29/18  3:49 PM   Result Value Ref Range    Vitamin B12 712 193 - 986 pg/mL   Drug abuse screen 8 urine (UR)    Collection Time: 10/29/18  5:15 PM   Result Value Ref Range    Amphetamine Qual Urine Negative NEG^Negative    Barbiturates Qual Urine Negative NEG^Negative    Benzodiazepine Qual Urine Negative NEG^Negative    Cannabinoids Qual Urine Negative NEG^Negative    Cocaine Qual Urine Negative NEG^Negative    Ethanol Qual Urine Negative NEG^Negative    Opiates Qualitative Urine Negative NEG^Negative    PCP Qual Urine Negative NEG^Negative   HCG qualitative urine    Collection Time: 10/29/18  5:15 PM   Result Value Ref Range    HCG Qual Urine Negative NEG^Negative   Chlamydia trachomatis PCR    Collection Time: 10/29/18  9:03 PM   Result Value Ref Range    Specimen Description Cervix     Chlamydia Trachomatis PCR Negative NEG^Negative   Neisseria gonorrhoea PCR    Collection Time: 10/29/18  9:03 PM   Result Value Ref Range    Specimen Descrip Cervix     N Gonorrhea PCR Negative NEG^Negative   HIV Antigen Antibody Combo    Collection Time: 10/30/18  7:20 AM   Result Value Ref Range    HIV Antigen Antibody Combo Nonreactive NR^Nonreactive               Assessment    Diagnostic Impression: Ms. Gautam is a 28-year-old  at [employer], with history of depression, possible alcohol use disorder, and possible cannabis use disorder who presented to the ED with psychosis including symptoms of paranoia and disorganization. Mental status exam was significant for possible RTIS, disorientation, blunted affect, speech latency, illogical and tangential speech, and disorganization. Due to the description of her relatively high functioning job, and family's collateral of her functional status having a significant deterioration over 36 hours leading up to admission, it seems that her psychosis may be secondary to substance use. Specifically, her history of K2/spice use leading to psychotic episode and hospital admission in January makes this possibility more likely.  Her psychomotor slowing, difficulty making decisions make catatonia a possible feature.  Major depressive disorder with psychotic features, or a primary psychotic disorder (schizophrenia vs schizoaffective disorder) are also possibilities at this time.     Hospital course: Tony Gautam was admitted to station 22 on a 72 hour hold on 10/28/18 and started on Olanzapine 5mg at bedtime. Escitalopram 10mg/day was also given at her prior hospitalization but this was held due to the patient not currently taking it. Behavioral PRN's of Olanzapine 10 mg PO/IM PRN for agitation, and hydroxyzine 25-50 mg PO Q4H PRN for anxiety were started. Trazodone 50 mg oral bedtime PRN for sleep was also made available. On day 2, slept poorly despite Trazodone PRN. Will trial Remeron and Zyprexa for suspected diagnosis of MDD with psychotic features. Suspect she has a component of catatonia, but she declined PO Ativan challenge.    Discontinued Medications (& Rationale):   - Held Escitalopram due to patient not taking this medication PTA.    Medical course: UDS negative. CMP, CBC, Lipids, TSH, B12, Folate all WNL.  Urine hCG negative. GC/Chlam pending.    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  - Start mirtazapine 15mg at bedtime and increase dose of olanzapine to 7.5mg.    Patient will be treated in therapeutic milieu with appropriate individual and group therapies as described.    Medical diagnoses to be addressed this admission:    # none    Data: See labs above  Consults: none    Legal Status: Voluntary as of 10/30/18.    Safety Assessment:   Behavioral Orders   Procedures     Code 1 - Restrict to Unit     Elopement precautions     Routine Programming     As clinically indicated     Status 15     Every 15 minutes.     Suicide precautions     Patients on Suicide Precautions should have a Combination Diet ordered that includes a Diet selection(s) AND a Behavioral Tray selection for Safe Tray - with utensils, or Safe Tray - NO utensils         Disposition: pending medication management, anticipate 1-2 weeks     Aylin Woody MS3 scribed for, and in the presence of, Anthony Torres MD    I was present with the medical student who participated in the service and documentation of the note. I have verified the history and personally performed the physical/mental status exam and medical decision making. I agree with the assessment and plan documented in the note.     Anthony Torres MD  PGY-1 Psychiatry Resident    Attestation:   The patient has been seen and evaluated by me,  Gerber Koroma. I have examined the patient today and reviewed the discharge plan with the resident and medical student. I agree with the final assessment and plan, as noted in the discharge summary. I have reviewed today's vital  signs, medications, labs and imaging.  Total time discharge plannin minutes  Gerber Koroma MD

## 2018-10-30 NOTE — PROGRESS NOTES
"INITIAL O.T. ASSESSMENT:  Tony has attended OT occasionally since admission.    Presents as quiet, preoccupied, tentative with delayed responses.      Was given and completed a written self assessment. Cited \"I was having an up and down mood and family was concerned\" as the reason for current hospitalization. When asked what staff can do to be most helpful during hospitalization, pt responded \"Talking with them would be supportive. As well as, listening to their words and wisdom.\"     Goals prioritized for hospitalization (selected from list): Community building, Spirituality, Forgiveness    Goals prioritized for hospitalization (self-described): \"To be better at giving, Finding happiness within myself and reflect onto others. To listen better when others communicate to me.\"     OT staff explained the purpose of pt being involved in current treatment plan.      Plan: Encourage ongoing attendance as able to meet self-stated goals, implement positive coping skills, engage in graded opportunities for success, and provide assessment ongoing.       10/30/18 1400   General Information   Date Initially Attended OT 10/30/18   Clinical Impression   Affect Anxious;Restricted   Orientation Oriented to person, place and time   Appearance and ADLs General cleanliness observed in most areas   Attention to Internal Stimuli Appears distracted/preoccupied internally   Interaction Skills Interacts with prompts, minimal response;Guarded   Ability to Communicate Needs Does so with prompts;Indirect   Verbal Content Superficial;Selective;Delayed response   Ability to Maintain Boundaries Maintains appropriate physical boundaries;Maintains appropriate verbal boundaries   Participation Participates with frequent encouragement   Concentration Concentrates 10-20 minutes;Needs further assessment   Ability to Concentrate Preoccupied;Needs further assessment   Follows and Comprehends Directions Needs further assessment   Memory Needs further " assessment   Organization Independently organizes simple tasks;Needs occasional assistance    Decision Making Needs further assessment   Planning and Problem Solving Occasionally needs assist/feedback   Ability to Apply and Learn Concepts Applies within group structure;Needs further assessment   Frustrations / Stress Tolerance Utilizes coping skills with prompts;Indirect responses to frustration   Level of Insight Needs further assessment   Self Esteem Poor self esteem;Needs further assessment   Social Supports Unable to identify any supports;Needs further assessment

## 2018-10-30 NOTE — PLAN OF CARE
"Problem: Cognitive Impairment (Psychotic Signs/Symptoms) (Adult)  Goal: Improved Thought Clarity/Organization (Psychotic Signs/Symptoms)  Outcome: Improving  Tony continues anxious rumination throughout evening-questioning whether she can be forgiven for what she has done-asking if she should disinfect any surfaces she touches because she was dx with STD when she was 21-initially unable to state what dx with, but post options listed acknowledged herpes-difficult to determine what information true as freq states she is uncertain about most topics-when asked if she is experiencing suicidal thoughts responding she doesn't know, but she might be-anxious expression throughout evening-seeks freq reassurance-visited by father-earlier spoke of having intimate relations with girlfriends boyfriend and feeling very guilty about it-ruminating re whether she should tell girlfriend or not-feeling she is \"bad\" and must change choices to become better person-then feeling she is worthless-difficult to determine what has actually occurred or may be misconstrued-hesitant, but accepted Zyprexa 5 mg HS tonight       "

## 2018-10-30 NOTE — PROGRESS NOTES
"INITIAL PSYCHOSOCIAL ASSESSMENT    I have reviewed the chart and interviewed the patient.     Presenting Problem  Per ED provider note, \"Bertha Gautam is a 28 year old female with a history of depression and psychosis who presents to the emergency department with her sister for a psychiatric evaluation. Of note, the patient's sister, whom she lives with, states that she has seemed more paranoid, distant, and disoriented at home. Yesterday, the patient called her and said that she needed to talk to her but would not describe what it was about and was acting very strange so she recommended that the patient come to the ED. The patient states that for the last several days she is feeling different but does not know how to describe it. She is unsure if it is depression but that she is feeling badly about \"stupid stuff\" that she has done in her life and regrets not listening to her close family and friends. She has not taken her medications since January. She states that she usually drinks \"a lot\" but has been drinking less in the last couple of weeks. The patient has never seen a psychiatrist or a therapist and denies use of marijuana or suicidal ideation.      Patient's sister: states that the patient is usually very high functioning, and works as a  at .  Patient is currently living with her sister. She usually jokes and is a pleasant conversationalist.  Their mother  3 months ago, and she is wondering if her symptoms could be related to this.\" Barbi Jernigan MD 10/28/2018          None    Is patient under a civil commitment/legal guardian?  No    History of Mental Illness and Chemical Health History  Pt has a history of depression, cannabis use disorder, and alcohol use disorder, in remission. The patient reports she was born in [place], [place].  She claims she was raised by her biological parents but declined to respond about her siblings. Pt was hospitalized in 2018 and placed " "on medications for one month. She has been medication nonadherent since 2018. She usually drinks \"a lot\" but has been drinking less in the last couple of weeks. Lexapro, Zyprexa     Family Description([facility], family psychiatric hx)  The patient reports she was born in [place], [place].  She claims she was raised by her biological parents but declined to respond about her siblings.     Significant Life Events (Trauma/Ilness/Death)  Mother  3 months ago    Living Situation  w/sister    Criminal hx and Legal Issues  Denies    Ethnic/Cultural Issues  The patient does not identify any ethnic or cultural issues that impact treatment    Spiritual Orientation  Anglican    [employer] Service History  Denies    Educational/Financial/Occupational  She reports she graduated from [employer] in [place].  She attended 1 year at [employer]. 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     Social functioning (organization, interests)  Unable to assess    Current Health Care Providers  Medication Management:   Therapist:   Primary Care:   :   [facility]/Home Health nurse:   Home Health Nurse:        Social Service Assessment/Plan    Patient would benefit from First episode psychosis program.   [facility] will consult with treatment team for additional treatment recommendations.  [facility] will schedule appointments with outpatient providers for follow-up post discharge.   Patient will continue to receive therapeutic support while hospitalized and is encouraged to attend therapies on the unit            "

## 2018-10-30 NOTE — PROGRESS NOTES
Patient has not slept so far this overnight. Patient has been almost constantly at the workstation. Patient extremely paranoid and hypervigilant. Patient lies in bed for brief periods of time before returning to the workstation with gross generalized anxiety. Patient complaining about not being able to sleep and did accept prn trazadone to minimal effect. Patient refused second dose of trazadone and any other prn. Patient became extremely paranoid of workmen who showed up on the unit to service ice machine and needed reassurance that she was safe. Patient complaining of noise from the ice machine but refuses to shut room door despite constant reassurances that she is safe. Will continue to monitor and assess.

## 2018-10-30 NOTE — PROGRESS NOTES
Isolative to self mostly, hypervigilant. Little verbal interaction. Put together puzzle in dining area most of pm. No groups. Ate meals.      10/30/18 1400   Behavioral Health   Thinking distractable;paranoid   Insight poor   Judgement impaired   Affect blunted, flat   Mood anxious;depressed   1. Wish to be Dead No   2. Non-Specific Active Suicidal Thoughts  No   Activity other (see comment);isolative;withdrawn  (Hypervigilant)   Psychomotor / Gait balanced;steady   Psycho Education   Type of Intervention structured groups   Response refuses   Activities of Daily Living   Hygiene/Grooming independent   Oral Hygiene independent   Dress independent   Laundry with supervision   Room Organization independent

## 2018-10-31 PROCEDURE — 93005 ELECTROCARDIOGRAM TRACING: CPT

## 2018-10-31 PROCEDURE — 99232 SBSQ HOSP IP/OBS MODERATE 35: CPT | Mod: GC | Performed by: PSYCHIATRY & NEUROLOGY

## 2018-10-31 PROCEDURE — 25000132 ZZH RX MED GY IP 250 OP 250 PS 637: Performed by: STUDENT IN AN ORGANIZED HEALTH CARE EDUCATION/TRAINING PROGRAM

## 2018-10-31 PROCEDURE — 93010 ELECTROCARDIOGRAM REPORT: CPT | Performed by: INTERNAL MEDICINE

## 2018-10-31 PROCEDURE — 12400007 ZZH R&B MH INTERMEDIATE UMMC

## 2018-10-31 RX ORDER — OLANZAPINE 5 MG/1
5 TABLET ORAL
Status: DISCONTINUED | OUTPATIENT
Start: 2018-10-31 | End: 2018-11-01 | Stop reason: HOSPADM

## 2018-10-31 RX ORDER — LORAZEPAM 1 MG/1
1 TABLET ORAL 2 TIMES DAILY
Status: DISCONTINUED | OUTPATIENT
Start: 2018-10-31 | End: 2018-11-01 | Stop reason: HOSPADM

## 2018-10-31 RX ORDER — OLANZAPINE 10 MG/2ML
5 INJECTION, POWDER, FOR SOLUTION INTRAMUSCULAR
Status: DISCONTINUED | OUTPATIENT
Start: 2018-10-31 | End: 2018-11-01 | Stop reason: HOSPADM

## 2018-10-31 RX ORDER — OLANZAPINE 5 MG/1
5 TABLET ORAL AT BEDTIME
Status: DISCONTINUED | OUTPATIENT
Start: 2018-10-31 | End: 2018-11-01 | Stop reason: HOSPADM

## 2018-10-31 RX ORDER — LORAZEPAM 2 MG/1
2 TABLET ORAL ONCE
Status: COMPLETED | OUTPATIENT
Start: 2018-10-31 | End: 2018-10-31

## 2018-10-31 RX ADMIN — OLANZAPINE 7.5 MG: 7.5 TABLET, FILM COATED ORAL at 09:13

## 2018-10-31 RX ADMIN — OLANZAPINE 5 MG: 5 TABLET, FILM COATED ORAL at 22:00

## 2018-10-31 RX ADMIN — LORAZEPAM 1 MG: 1 TABLET ORAL at 20:07

## 2018-10-31 RX ADMIN — LORAZEPAM 2 MG: 2 TABLET ORAL at 09:33

## 2018-10-31 ASSESSMENT — ACTIVITIES OF DAILY LIVING (ADL)
LAUNDRY: WITH SUPERVISION
DRESS: INDEPENDENT
ORAL_HYGIENE: INDEPENDENT
GROOMING: INDEPENDENT
GROOMING: INDEPENDENT
DRESS: INDEPENDENT
ORAL_HYGIENE: INDEPENDENT
LAUNDRY: UNABLE TO COMPLETE

## 2018-10-31 NOTE — PROGRESS NOTES
"Pt approached writer and stated, \"I don't feel safe, I don't know if I can keep myself safe\". Writer inquired about any triggers that made her feel unsafe. Pt was unable to provide any insight into this. Writer asked pt what would make her feel safe. Pt reported that she needed reassurance about that she'll be safe, as well as if her family will be safe, too. Writer reassured pt that she is in a very safe place and that there is also staff here 24/7 to help her if she needs anything. Writer also reminded pt that her father and sister visited and are now home safe. Writer noted she can call her father and/or sister in the morning to check in. Pt stated to writer that she wants to leave tomorrow and will keep herself safe tonight. No hallucinations or delusions noted to writer. No other concerns reported.        10/30/18 2000   Behavioral Health   Thinking distractable;delusional;paranoid   Insight poor   Judgement impaired   Affect blunted, flat   Mood depressed;shame/guilt;anxious   1. Wish to be Dead No  (states \"I don't know\" as well)   2. Non-Specific Active Suicidal Thoughts  No  (uncertain tonight)   3. Active Sucidal Ideation with any Methods (Not Plan) Without Intent to Act  No   4. Active Suicidal Ideation with Some Intent to Act, Without Specific Plan  No   Change in Protective Factors? No   Enviromental Risk Factors None   Activity withdrawn;restless   Speech other (see comments)  (hesistant)   Psychomotor / Gait paces   Activities of Daily Living   Hygiene/Grooming independent   Oral Hygiene independent   Dress independent   Room Organization independent         "

## 2018-10-31 NOTE — PROGRESS NOTES
10/31/18 1500   Behavioral Health   Hallucinations denies / not responding to hallucinations   Thinking delusional;paranoid   Orientation person: oriented;place: oriented;date: oriented;time: oriented   Memory baseline memory   Insight poor   Judgement impaired   Affect blunted, flat   Mood anxious   Physical Appearance/Attire attire appropriate to age and situation   Hygiene neglected grooming - unclean body, hair, teeth   Suicidality other (see comments)  (Denies)   1. Wish to be Dead No   2. Non-Specific Active Suicidal Thoughts  No   Change in Protective Factors? No   Enviromental Risk Factors None   Self Injury other (see comment)  (Denies)   Activity isolative;withdrawn   Speech clear;coherent   Medication Sensitivity no stated side effects;no observed side effects   Psychomotor / Gait balanced;steady   Activities of Daily Living   Hygiene/Grooming independent   Oral Hygiene independent   Dress independent   Laundry with supervision   Room Organization independent     Pt. Was isolative and withdrawn throughout the shift. She denies any hallucinations, but she is showing delusional thinking in that she believes that everyone is talking about her and doesn't like her. She presents as anxious due to this belief and doesn't want to come out into the milieu due to her belief that she is being judged.

## 2018-10-31 NOTE — PLAN OF CARE
Pt has refused all medications this pm,she has repeatedly stated that she does not feel safe and has [id number] this pm for the stated reason that she does not feel safe here,security was requested to see pt and reassure her that she is safe and secure here,she has contracted for safety around her suicidality and been reassured by nursing staff also that she is safe here,she has also been told that she will be placed on a phone restriction if she is not able to cease calling 911,suicide and elopementnprecautions remain in place,defer to team

## 2018-10-31 NOTE — PROGRESS NOTES
Patient requested an 1:1 with staff. Patient talked about how she has done bad things in the past and wanted to know how she could be for given for these transgressions. Staff talked with patient about taking accountability for what one has done in the past but also to remember that it's okay to forgive oneself and to move on in a positive light. Staff gave patient homework to write a list of things she felt might help her in this process. When patient shared her list with staff-staff discussed the viability of what she had written and gave her support for her journey to wellness. Encouraged patient to take prescribed medications  To enhance insight.  Will continue to monitor and assess.

## 2018-10-31 NOTE — PROGRESS NOTES
"    ----------------------------------------------------------------------------------------------------------  [facility], [place]   Psychiatric Progress Note  Hospital Day #3     Interim History:   The patient's care was discussed with the treatment team and chart notes were reviewed.    Sleep: 4 hrs   Scheduled Medications: refused  PRN's: none taken  Staff Report:  She approached staff stating \"I don't feel safe\" multiple times and called 911 x2 and security responded to help reassure her that she was safe. She continued to state that she didn't know if she could keep herself safe and requested a 1:1 from staff early this AM. Suicide and elopement precautions remain in effect. She continued to appear anxious, ruminating on her guilt, and pacing.     Patient Interview: Patient was interviewed in the team room. She was partially sedated from AM meds including Zyprexa which she had declined last night. Of note, the interview occurred approximately 1 hour after dose of 2mg Ativan. Her speech did appear more fluid during this interview. She stated that her sleep was improved from the previous night, but that she still feels anxious about being here. She reported that her anxiety was a 2/10 at admission but has increased to 5/10. She felt that people were trying to hurt her, but did could not say who specifically.  When asked why she did not take her PM medications, she stated that she was not tired then. She worried about how sedating the medications are. The team discussed with her the benefits of the medications, and she agreed to try taking them as scheduled at night so that she doesn't need to during the day. She reported regarding her mood, that \"before I was wanting to give up, but now I don't want to.\" She then said \"I don't think I need to give up my bed. You asked about food as well...\" and began to trail off. She stated the visit with her family yesterday went well.     The " risks, benefits, alternatives and side effects of any medication changes have been discussed and are understood by the patient and other caregivers.    Review of systems:     ROS was negative unless noted above.          Allergies:   No Known Allergies         Psychiatric Examination:   /77  Pulse 103  Temp 97.6  F (36.4  C) (Tympanic)  Resp 16  Wt 65.8 kg (145 lb)  SpO2 97%  BMI 22.05 kg/m2  Weight is 145 lbs 0 oz  Body mass index is 22.05 kg/(m^2).    Appearance:  appeared as age stated, awake, fatigued, cooperative and slightly unkempt  Attitude:  cooperative and guarded  Eye Contact:  falling asleep during interview with eyes closed, but good contact when open.  Mood:  depressed but more hopeful than previous days  Affect:  mood congruent, intensity is blunted but slightly improved from previous day  Speech:  clear, coherent and some remaining speech latency but improved from previous day  Psychomotor Behavior:  physical retardation  Thought Process:  Disorganized and rambling at times, progressing to more logical and linear thought processes intermittently throughout interview.  Associations:  no loose associations  Thought Content:  May still have passive SI present, but more hopeful than before. She does not want to give up now. Occasionally has paranoid thoughts of people wanted to hurt her and feeling unsafe.  Insight:  limited  Judgment:  poor  Oriented to:  time, person, and place  Attention Span and Concentration:  intact  Recent and Remote Memory:  intact  Language: intact to conversation   Fund of Knowledge: appropriate  Muscle Strength and Tone: normal  Gait and Station: Normal         Labs:     EKG revealed sinus rhythm with sinus arrythmia. Not concerning for acute cardiac pathology. QTc 425ms    Chlamydia and gonorrhea PCR: negative.   HIV nonreactive.     Assessment    Diagnostic Impression: Ms. Gautam is a 28-year-old  [facility] [employer], with history of depression,  possible alcohol use disorder, and possible cannabis use disorder who presented to the ED with psychosis including symptoms of paranoia and disorganization. Mental status exam was significant for possible RTIS, disorientation, blunted affect, speech latency, illogical and tangential speech, and disorganization. Due to the description of her relatively high functioning job, and family's collateral of her functional status having a significant deterioration over 36 hours leading up to admission, it seems that her psychosis may be secondary to substance use. Specifically, her history of K2/spice use leading to psychotic episode and hospital admission in January makes this possibility more likely.  Her psychomotor slowing, difficulty making decisions make catatonia a possible feature.  Major depressive disorder with psychotic features, or a primary psychotic disorder (schizophrenia vs schizoaffective disorder) are also possibilities at this time.     Hospital course: Tony Gautam was admitted to station 22 on a 72 hour hold on 10/28/18 and started on Olanzapine 5mg at bedtime. Escitalopram 10mg/day was also given at her prior hospitalization but this was held due to the patient not currently taking it. Behavioral PRN's of Olanzapine 10 mg PO/IM PRN for agitation, and hydroxyzine 25-50 mg PO Q4H PRN for anxiety were started. Trazodone 50 mg oral bedtime PRN for sleep was also made available. On day 2, slept poorly despite Trazodone PRN. Will trial Remeron and Zyprexa for suspected diagnosis of MDD with psychotic features. Suspect she has a component of catatonia, but she declined PO Ativan challenge. On day 3, her sleep was improved slightly, but she had refused scheduled medications or any PRNs the night prior.  She was willing to take her PM dose that morning, and after discussion of benefits with treatment team, agreed to take as recommended at night to lessen sedative effects. After the ativan challenge, her  speech was more fluid, seeming to respond well to targeting catatonic symptoms.    Discontinued Medications (& Rationale):   - Held Escitalopram due to patient not taking this medication PTA.    Medical course: UDS negative. CMP, CBC, Lipids, TSH, B12, Folate all WNL. Urine hCG negative. GC/Chlam negative. MRI head w/o contrast and further labs pending for first episode pyschosis work-up.    Plan   Principal Diagnosis:   #Psychosis NOS (MDD with psychotic features vs brief psychotic disorder vs substance induced psychosis)    Secondary psychiatric diagnoses of concern this admission:   #Alcohol use disorder  #R/o Cannabis use disorder    Psychotropic Medications:  Modify:  - Start 1 mg ativan BID for catatonic symptoms.  - Start mirtazapine 15mg at bedtime and decrease dose of olanzapine to 5 mg.  - Decrease PRN olanzapine to 5 mg.     Patient will be treated in therapeutic milieu with appropriate individual and group therapies as described.    Medical diagnoses to be addressed this admission:    # none    Data: See labs above  Consults: none    Legal Status: Voluntary as of 10/30/18.    Safety Assessment:   Behavioral Orders   Procedures     Code 2     Elopement precautions     Routine Programming     As clinically indicated     Status 15     Every 15 minutes.     Suicide precautions     Patients on Suicide Precautions should have a Combination Diet ordered that includes a Diet selection(s) AND a Behavioral Tray selection for Safe Tray - with utensils, or Safe Tray - NO utensils         Disposition: pending medication management, anticipate 1-2 weeks     ROSALBA Childress scribed for, and in the presence of, Anthony Torres MD    I was present with the medical student who participated in the service and documentation of the note. I have verified the history and personally performed the physical/mental status exam and medical decision making. I agree with the assessment and plan documented in the note.      Anthony Torres MD  PGY-1 Psychiatry Resident    Attending Attestation:    Attestation:  This patient has been seen and evaluated by me, Gerber Koroma.  I have discussed this patient with the house staff team including the resident and medical student and I agree with the findings and plan in this note.    I have reviewed today's vital signs, medications, labs and imaging. Gerber Koroma MD

## 2018-11-01 VITALS
RESPIRATION RATE: 16 BRPM | HEART RATE: 112 BPM | OXYGEN SATURATION: 97 % | SYSTOLIC BLOOD PRESSURE: 126 MMHG | WEIGHT: 141.5 LBS | DIASTOLIC BLOOD PRESSURE: 82 MMHG | BODY MASS INDEX: 21.52 KG/M2 | TEMPERATURE: 97.7 F

## 2018-11-01 LAB
B BURGDOR IGG+IGM SER QL: 0.11 (ref 0–0.89)
CERULOPLASMIN SERPL-MCNC: 31 MG/DL (ref 23–53)
ERYTHROCYTE [SEDIMENTATION RATE] IN BLOOD BY WESTERGREN METHOD: 6 MM/H (ref 0–20)
INTERPRETATION ECG - MUSE: NORMAL
T PALLIDUM AB SER QL: NONREACTIVE

## 2018-11-01 PROCEDURE — 86038 ANTINUCLEAR ANTIBODIES: CPT | Performed by: STUDENT IN AN ORGANIZED HEALTH CARE EDUCATION/TRAINING PROGRAM

## 2018-11-01 PROCEDURE — 86618 LYME DISEASE ANTIBODY: CPT | Performed by: STUDENT IN AN ORGANIZED HEALTH CARE EDUCATION/TRAINING PROGRAM

## 2018-11-01 PROCEDURE — 25000132 ZZH RX MED GY IP 250 OP 250 PS 637: Performed by: STUDENT IN AN ORGANIZED HEALTH CARE EDUCATION/TRAINING PROGRAM

## 2018-11-01 PROCEDURE — 85652 RBC SED RATE AUTOMATED: CPT | Performed by: STUDENT IN AN ORGANIZED HEALTH CARE EDUCATION/TRAINING PROGRAM

## 2018-11-01 PROCEDURE — G0177 OPPS/PHP; TRAIN & EDUC SERV: HCPCS

## 2018-11-01 PROCEDURE — 82390 ASSAY OF CERULOPLASMIN: CPT | Performed by: STUDENT IN AN ORGANIZED HEALTH CARE EDUCATION/TRAINING PROGRAM

## 2018-11-01 PROCEDURE — 99238 HOSP IP/OBS DSCHRG MGMT 30/<: CPT | Mod: GC | Performed by: PSYCHIATRY & NEUROLOGY

## 2018-11-01 PROCEDURE — 36415 COLL VENOUS BLD VENIPUNCTURE: CPT | Performed by: STUDENT IN AN ORGANIZED HEALTH CARE EDUCATION/TRAINING PROGRAM

## 2018-11-01 RX ORDER — OLANZAPINE 5 MG/1
5 TABLET ORAL AT BEDTIME
Qty: 30 TABLET | Refills: 0 | Status: ON HOLD | OUTPATIENT
Start: 2018-11-01 | End: 2020-06-17

## 2018-11-01 RX ORDER — LORAZEPAM 1 MG/1
1 TABLET ORAL 2 TIMES DAILY
Qty: 60 TABLET | Refills: 0 | Status: ON HOLD | OUTPATIENT
Start: 2018-11-01 | End: 2020-06-17

## 2018-11-01 RX ADMIN — LORAZEPAM 1 MG: 1 TABLET ORAL at 08:36

## 2018-11-01 ASSESSMENT — ACTIVITIES OF DAILY LIVING (ADL)
GROOMING: INDEPENDENT
DRESS: INDEPENDENT
ORAL_HYGIENE: INDEPENDENT
LAUNDRY: WITH SUPERVISION

## 2018-11-01 NOTE — DISCHARGE SUMMARY
"    Psychiatric Discharge Summary    Hospital Day #4    Tony Gautam MRN# [id number]   Age: 28 year old YOB: 1990     Date of Admission:  10/28/2018  Date of Discharge:  11/1/18  Admitting Physician:  Gerber Koroma MD  Discharge Physician:  Gerber Koroma MD         Event Leading to Hospitalization:   This patient is a 28 year old female with a history of major depressive disorder, alcohol use disorder, possible cannabis use disorder, who presented to the [facility] ED on 10/28/2018 with her sister due to disorganization and paranoia. Collateral from family established that the patient had become gradually more withdrawn over the past two weeks but had developed mental status changes more acutely, making paranoid statements that she is worried about people that are out to get her and that she is worried she is going to die. She had also reportedly started talking in incomplete sentences and had moments of not responding. The patient had a psychiatric hospitalization in January of this year for worsening depressive symptoms, passive suicidal ideation, paranoia, and thought blocking in the setting of cannabis use, and may have used K2/Spice prior to this admission as well. Discharge medications included Lexapro and Olanzapine. The patient reportedly had discontinued these medications after discharge and not followed up with appointments.     She was notably paranoid in the ER, making statements that she would talk once the cameras were turned off, pointing at the blood pressure cuffs. On admission interview, she was noted to have blunted affect, significant latency of speech, disorganized, illogical, and tangential thoughts, paranoia. Per patient's father, he noticed she was \"all over the place\" during a conversation on the phone last night but otherwise she had been doing okay to his knowledge. As far as he is aware current decompensation has been over the past 24-36 hours. Patient has no history " of gee or other episodes other than the one at [facility] in February. He acknowledged patient has a history of alcohol use but has really cut back recently.       See Admission note by Gerber Koroma MD on 10/28/18 for additional details.          Diagnoses:   Assessment: Ms. Gautam is a 28-year-old  at [employer], with history of depression, possible alcohol use disorder, and possible cannabis use disorder who presented to the ED with psychosis including symptoms of paranoia and disorganization. Mental status exam was significant for possible RTIS, disorientation, blunted affect, speech latency, illogical and tangential speech, and disorganization. Due to the description of her relatively high functioning job, and family's collateral of her functional status having a significant deterioration over 36 hours leading up to admission, her psychosis may be secondary to substance use. Specifically, her history of K2/spice use leading to psychotic episode and hospital admission in January raises suspicion for this; however, the patient denied using drugs including K2/spice and UDS was negative. She was noted to have psychomotor slowing, difficulty making decisions make catatonia, and she did respond positively to an Ativan challenge. 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 A primary psychotic disorder (schizophrenia vs schizoaffective disorder) is not ruled out at this time.          Consults:     None         Hospital Course:     Tony Gautam was admitted to station 22 on a 72 hour hold on 10/28/18 and started on Olanzapine 5mg at bedtime. Escitalopram 10mg/day was also given at her prior hospitalization but this was held due to the patient not currently taking it. Behavioral PRN's of Olanzapine 10 mg PO/IM PRN for agitation, and hydroxyzine 25-50 mg PO Q4H PRN for anxiety were started. Trazodone 50 mg  oral bedtime PRN for sleep was also made available. On day 2, slept poorly despite Trazodone PRN. Will trial Remeron and Zyprexa for suspected diagnosis of MDD with psychotic features. Suspect she has a component of catatonia, but she declined PO Ativan challenge. On day 3, her sleep was improved slightly, but she had refused scheduled medications or any PRNs the night prior. She was willing to take her PM Zyprexa dose that morning, and after discussion of benefits with treatment team, agreed to take as recommended at night to lessen sedative effects. She also took Ativan that morning, and an interview one hour later revealed more fluid speech and less psychomotor retardation. Due to this improvement, Ativan 1mg bid was initiated. She again declined to take her PM dose of Remeron on this day. On day 4, patient felt much improved and requested to discharge. Although our recommendations were for her to stay to continue adjusting medications in order to optimize her benefit from them, she chose to sign out against medical advice. She was not seen to be a danger to herself or others at the time, and was determined to have adequate decision-making capacity.     Tony Gautam was discharged to home against medical advice. At the time of discharge Tony Gautam was determined to not be a danger to herself or others.    Medical Course: UDS negative. CMP, CBC, Lipids, TSH, B12, Folate all WNL. Urine hCG negative. GC/Chlam negative. Ceruloplasmin, Lyme, ESR all WNL. IRMA in process. She declined head MRI.     Risk Assessment: Tony Gautam has notable risk factors for self-harm, including  mental illness, excessive alcohol use.    However, risk is mitigated by access to mental health care, family and community support. Additional steps taken to minimize risk include: initiation of medications, development of safety plan including reaching out to friends, family, or mental health care providers or returning to ED if  suicidal thoughts continue. Therefore, based on all available evidence including the factors cited above, Tony Gautam does not appear to be at imminent risk for self-harm, and is appropriate for outpatient level of care.     This document serves as a transfer of care to Tony Gautam's outpatient providers.         Discharge Medications:     Current Discharge Medication List      START taking these medications    Details   LORazepam (ATIVAN) 1 MG tablet Take 1 tablet (1 mg) by mouth 2 times daily  Qty: 60 tablet, Refills: 0    Associated Diagnoses: Catatonia associated with another mental disorder         CONTINUE these medications which have CHANGED    Details   OLANZapine (ZYPREXA) 5 MG tablet Take 1 tablet (5 mg) by mouth At Bedtime  Qty: 30 tablet, Refills: 0    Associated Diagnoses: Psychosis, unspecified psychosis type (H)         STOP taking these medications       escitalopram (LEXAPRO) 10 MG tablet Comments:   Reason for Stopping:                      Psychiatric Examination:     Appearance:  awake, alert and adequately groomed  Attitude:  evasive and guarded  Eye Contact:  fair  Mood:  better  Affect:  appropriate and in normal range, intensity is blunted and constricted mobility  Speech:  clear, coherent and increased speech latency but improved from prior interview  Psychomotor Behavior:  no evidence of tardive dyskinesia, dystonia, or tics and intact station, gait and muscle tone  Thought Process:  logical, linear and goal oriented  Associations:  no loose associations  Thought Content:  no evidence of suicidal ideation or homicidal ideation and no evidence of psychotic thought  Insight:  limited  Judgment:  limited  Oriented to:  time, person, and place  Attention Span and Concentration:  intact  Recent and Remote Memory:  intact  Language: Intact to conversation   Fund of Knowledge: Appears mildly impaired due to mental status  Muscle Strength and Tone: normal  Gait and Station: Normal          Discharge Plan:   Date/Time:  12pm    Provider: Zuly Dawson (Therapy)    Date/Time:  11am      Provider: Dr. Gisel Mcdowell (Medication Management)  [address] [facility] [address] [place] [address]   Phone:           [phone] Fax:                [phone]    Pt seen and discussed with my attending, MD Anthony Phillip MD  PGY-1 Psychiatry Resident    Attestation:   The patient has been seen and evaluated by me,  Gerber Koroma. I have examined the patient today and reviewed the discharge plan with the resident and medical student. I agree with the final assessment and plan, as noted in the discharge summary. I have reviewed today's vital signs, medications, labs and imaging.  Total time discharge plannin minutes  Gerber Koroma MD            Appendix A: All Labs This Admission:     Results for orders placed or performed during the hospital encounter of 10/28/18   CBC with platelets differential   Result Value Ref Range    WBC 9.1 4.0 - 11.0 10e9/L    RBC Count 4.83 3.8 - 5.2 10e12/L    Hemoglobin 13.9 11.7 - 15.7 g/dL    Hematocrit 40.5 35.0 - 47.0 %    MCV 84 78 - 100 fl    MCH 28.8 26.5 - 33.0 pg    MCHC 34.3 31.5 - 36.5 g/dL    RDW 11.8 10.0 - 15.0 %    Platelet Count 234 150 - 450 10e9/L    Diff Method Automated Method     % Neutrophils 77.9 %    % Lymphocytes 16.5 %    % Monocytes 5.2 %    % Eosinophils 0.0 %    % Basophils 0.1 %    % Immature Granulocytes 0.3 %    Nucleated RBCs 0 0 /100    Absolute Neutrophil 7.1 1.6 - 8.3 10e9/L    Absolute Lymphocytes 1.5 0.8 - 5.3 10e9/L    Absolute Monocytes 0.5 0.0 - 1.3 10e9/L    Absolute Eosinophils 0.0 0.0 - 0.7 10e9/L    Absolute Basophils 0.0 0.0 - 0.2 10e9/L    Abs Immature Granulocytes 0.0 0 - 0.4 10e9/L    Absolute Nucleated RBC 0.0    Comprehensive metabolic panel   Result Value Ref Range    Sodium 138 133 - 144 mmol/L    Potassium 3.8 3.4 - 5.3 mmol/L    Chloride 104 94 - 109 mmol/L    Carbon Dioxide 27  20 - 32 mmol/L    Anion Gap 7 3 - 14 mmol/L    Glucose 105 (H) 70 - 99 mg/dL    Urea Nitrogen 11 7 - 30 mg/dL    Creatinine 0.80 0.52 - 1.04 mg/dL    GFR Estimate 85 >60 mL/min/1.7m2    GFR Estimate If Black >90 >60 mL/min/1.7m2    Calcium 9.2 8.5 - 10.1 mg/dL    Bilirubin Total 0.7 0.2 - 1.3 mg/dL    Albumin 4.5 3.4 - 5.0 g/dL    Protein Total 8.5 6.8 - 8.8 g/dL    Alkaline Phosphatase 56 40 - 150 U/L    ALT 33 0 - 50 U/L    AST 18 0 - 45 U/L   Lipid panel   Result Value Ref Range    Cholesterol 140 <200 mg/dL    Triglycerides 40 <150 mg/dL    HDL Cholesterol 53 >49 mg/dL    LDL Cholesterol Calculated 79 <100 mg/dL    Non HDL Cholesterol 87 <130 mg/dL   TSH with free T4 reflex and/or T3 as indicated   Result Value Ref Range    TSH 0.63 0.40 - 4.00 mU/L   Folate   Result Value Ref Range    Folate 17.4 >5.4 ng/mL   Vitamin B12   Result Value Ref Range    Vitamin B12 712 193 - 986 pg/mL   Drug abuse screen 8 urine (UR)   Result Value Ref Range    Amphetamine Qual Urine Negative NEG^Negative    Barbiturates Qual Urine Negative NEG^Negative    Benzodiazepine Qual Urine Negative NEG^Negative    Cannabinoids Qual Urine Negative NEG^Negative    Cocaine Qual Urine Negative NEG^Negative    Ethanol Qual Urine Negative NEG^Negative    Opiates Qualitative Urine Negative NEG^Negative    PCP Qual Urine Negative NEG^Negative   HCG qualitative urine   Result Value Ref Range    HCG Qual Urine Negative NEG^Negative   HIV Antigen Antibody Combo   Result Value Ref Range    HIV Antigen Antibody Combo Nonreactive NR^Nonreactive       Erythrocyte sedimentation rate auto   Result Value Ref Range    Sed Rate 6 0 - 20 mm/h   EKG 12-lead, complete   Result Value Ref Range    Interpretation ECG Click View Image link to view waveform and result    Chlamydia trachomatis PCR   Result Value Ref Range    Specimen Description Cervix     Chlamydia Trachomatis PCR Negative NEG^Negative   Neisseria gonorrhoea PCR   Result Value Ref Range    Specimen  Descrip Cervix     N Gonorrhea PCR Negative NEG^Negative

## 2018-11-01 NOTE — DISCHARGE INSTRUCTIONS
Behavioral Discharge Planning and Instructions      Summary:  You were admitted on 10/28/2018  due to Disorganized Thinking/Behaviors and Psychotic Symptomology.  You were treated by Dr. Gerber Koroma MD and discharged on 11/1/18 from [facility] to Home. You are being discharged against medical advice (AMA) as the treatment team believes that you could benefit from further care while you stabilize. You are encouraged to return to the hospital if your symptoms worsen to continue treatment.        Principal Diagnosis: Depression with psychotic features    Health Care Follow-up Appointments:   Date/Time: Tuesday Nov. 6th 12pm   Provider: Zuly Dawson (Therapy)  Date/Time: Wednesday Nov. 28th 11am    Provider: Dr. Gisel Mcdowell (Medication Management)  [address] [facility] [address] [place] [address]   Phone: [phone] Fax: [phone]    Attend all scheduled appointments with your outpatient providers. Call at least 24 hours in advance if you need to reschedule an appointment to ensure continued access to your outpatient providers.   Major Treatments, Procedures and Findings:  You were provided with: a psychiatric assessment and medication evaluation and/or management    Symptoms to Report: feeling more aggressive, increased confusion, mood getting worse or thoughts of suicide    Early warning signs can include: increased depression or anxiety sleep disturbances increased thoughts or behaviors of suicide or self-harm     Safety and Wellness:  Take all medicines as directed.  Make no changes unless your doctor suggests them.      Follow treatment recommendations.  Refrain from alcohol and non-prescribed drugs.  If there is a concern for safety, call 911.    Resources:   Crisis Intervention: 875.601.3318 or 688-526-0449 (TTY: 121.272.8765).  Call anytime for help.  National Vale on Mental Illness (www.[place].trace.org): 927.716.2570 or 161-452-1837.  Suicide Awareness Voices of Education (SAVE) (www.save.org):  "888-511-SAVE (7283)  National Suicide Prevention Line (www.mentalhealthmn.org): 460-305-CISS (3885)  Mental Health Consumer/Survivor Network of [place] (www.mhcsn.net): 360.183.5784 or 999-381-3025  Self- Management and Recovery Training., SMART-- Toll free: 738.912.6196  www.[employer].Filtec  [facility] Crisis (COPE) Response - Adult [phone]  Text 4 Life: txt \"LIFE\" to 67980 for immediate support and crisis intervention  Crisis text line: Text \"[place]\" to 764504. Free, confidential, 24/7.  Crisis Intervention: 207.506.4455 or 036-187-5665. Call anytime for help.   [facility] Crisis Team - Child: [phone]    The treatment team has appreciated the opportunity to work with you.     If you have any questions or concerns our unit number is [phone]      "

## 2018-11-01 NOTE — PLAN OF CARE
"Problem: Cognitive Impairment (Psychotic Signs/Symptoms) (Adult)  Goal: Improved Thought Clarity/Organization (Psychotic Signs/Symptoms)  Outcome: Adequate for Discharge Date Met: 11/01/18  Tony discharged AMA 1520 care of father to return home-reviewed medication schedule and outpt tx plans and verbalized understanding-has 30 day supply of medications in her possession-despite encouragement to continue hospitalization until further stabilization, insisted she discharge home-signed AMA discharge form-states her mood is \"content\"-denies SI         "

## 2018-11-01 NOTE — PROGRESS NOTES
"Pt was observed in the periphery of the milieu to watch TV and eat meals and attend community meeting. Pt shared appropriately during community meeting and stayed for the entirety of the meeting. During check-in with writer pt expressed distress about how much their stay in the hospital would cost and if their insurance would cover the cost. Pt stated that they have not been experiencing any anxiety or depression this evening and were able to contract for safety on the unit. Pt went on to say that they wanted to wait until later this evening to take their medication as it makes them feel sedated. Pt stated that their long term goal is to \"get better and become better in life\". Pt denied experiencing hallucinations.      10/31/18 1912   Behavioral Health   Hallucinations denies / not responding to hallucinations   Thinking paranoid;distractable   Orientation person: oriented;situation, disoriented;place: oriented;date: oriented   Memory baseline memory   Insight poor   Judgement impaired   Eye Contact at examiner   Affect blunted, flat   Mood anxious   Physical Appearance/Attire attire appropriate to age and situation   Hygiene neglected grooming - unclean body, hair, teeth;body odor   Suicidality (denies)   1. Wish to be Dead No   2. Non-Specific Active Suicidal Thoughts  No   Self Injury (denies)   Elopement Statements about wanting to leave   Activity withdrawn   Speech clear;coherent   Medication Sensitivity sedation   Psychomotor / Gait balanced;steady   Psycho Education   Type of Intervention 1:1 intervention   Response participates with encouragement   Hours 0.5   Treatment Detail (Check-in)   Activities of Daily Living   Hygiene/Grooming independent   Oral Hygiene independent   Dress independent   Laundry unable to complete   Room Organization independent     "

## 2018-11-02 LAB — ANA SER QL IF: NEGATIVE

## 2020-06-17 ENCOUNTER — TELEPHONE (OUTPATIENT)
Dept: BEHAVIORAL HEALTH | Facility: CLINIC | Age: 30
End: 2020-06-17

## 2020-06-17 ENCOUNTER — HOSPITAL ENCOUNTER (INPATIENT)
Facility: CLINIC | Age: 30
LOS: 1 days | Discharge: HOME | DRG: 885 | End: 2020-06-18
Attending: PSYCHIATRY & NEUROLOGY | Admitting: PSYCHIATRY & NEUROLOGY
Payer: COMMERCIAL

## 2020-06-17 ENCOUNTER — HOSPITAL ENCOUNTER (EMERGENCY)
Facility: CLINIC | Age: 30
Discharge: OTHER INSTITUTION | End: 2020-06-17
Attending: EMERGENCY MEDICINE | Admitting: EMERGENCY MEDICINE
Payer: COMMERCIAL

## 2020-06-17 VITALS
OXYGEN SATURATION: 100 % | TEMPERATURE: 98.7 F | HEIGHT: 67 IN | SYSTOLIC BLOOD PRESSURE: 134 MMHG | RESPIRATION RATE: 13 BRPM | WEIGHT: 140 LBS | DIASTOLIC BLOOD PRESSURE: 90 MMHG | HEART RATE: 118 BPM | BODY MASS INDEX: 21.97 KG/M2

## 2020-06-17 DIAGNOSIS — T14.91XA SUICIDE ATTEMPT (H): ICD-10-CM

## 2020-06-17 DIAGNOSIS — F23 ACUTE PSYCHOSIS (H): ICD-10-CM

## 2020-06-17 DIAGNOSIS — R55 SYNCOPE, UNSPECIFIED SYNCOPE TYPE: ICD-10-CM

## 2020-06-17 DIAGNOSIS — F29 PSYCHOSIS, UNSPECIFIED PSYCHOSIS TYPE (H): Primary | ICD-10-CM

## 2020-06-17 PROBLEM — R45.851 SUICIDAL IDEATION: Status: ACTIVE | Noted: 2020-06-17

## 2020-06-17 LAB
ALBUMIN SERPL-MCNC: 3.6 G/DL (ref 3.4–5)
ALBUMIN UR-MCNC: NEGATIVE MG/DL
ALP SERPL-CCNC: 46 U/L (ref 40–150)
ALT SERPL W P-5'-P-CCNC: 28 U/L (ref 0–50)
AMPHETAMINES UR QL SCN: NEGATIVE
ANION GAP SERPL CALCULATED.3IONS-SCNC: 3 MMOL/L (ref 3–14)
APPEARANCE UR: CLEAR
AST SERPL W P-5'-P-CCNC: 17 U/L (ref 0–45)
BARBITURATES UR QL: NEGATIVE
BASOPHILS # BLD AUTO: 0 10E9/L (ref 0–0.2)
BASOPHILS NFR BLD AUTO: 0.2 %
BENZODIAZ UR QL: NEGATIVE
BILIRUB SERPL-MCNC: 0.5 MG/DL (ref 0.2–1.3)
BILIRUB UR QL STRIP: NEGATIVE
BUN SERPL-MCNC: 13 MG/DL (ref 7–30)
CALCIUM SERPL-MCNC: 8.6 MG/DL (ref 8.5–10.1)
CANNABINOIDS UR QL SCN: NEGATIVE
CHLORIDE SERPL-SCNC: 109 MMOL/L (ref 94–109)
CO2 SERPL-SCNC: 27 MMOL/L (ref 20–32)
COCAINE UR QL: NEGATIVE
COLOR UR AUTO: YELLOW
CREAT SERPL-MCNC: 0.9 MG/DL (ref 0.52–1.04)
DIFFERENTIAL METHOD BLD: NORMAL
EOSINOPHIL # BLD AUTO: 0.1 10E9/L (ref 0–0.7)
EOSINOPHIL NFR BLD AUTO: 1.7 %
ERYTHROCYTE [DISTWIDTH] IN BLOOD BY AUTOMATED COUNT: 12.2 % (ref 10–15)
ETHANOL SERPL-MCNC: <0.01 G/DL
GFR SERPL CREATININE-BSD FRML MDRD: 87 ML/MIN/{1.73_M2}
GLUCOSE SERPL-MCNC: 101 MG/DL (ref 70–99)
GLUCOSE UR STRIP-MCNC: NEGATIVE MG/DL
HCG SERPL QL: NEGATIVE
HCT VFR BLD AUTO: 38.3 % (ref 35–47)
HGB BLD-MCNC: 12.9 G/DL (ref 11.7–15.7)
HGB UR QL STRIP: NEGATIVE
IMM GRANULOCYTES # BLD: 0 10E9/L (ref 0–0.4)
IMM GRANULOCYTES NFR BLD: 0 %
INTERPRETATION ECG - MUSE: NORMAL
KETONES UR STRIP-MCNC: NEGATIVE MG/DL
LEUKOCYTE ESTERASE UR QL STRIP: NEGATIVE
LYMPHOCYTES # BLD AUTO: 1.2 10E9/L (ref 0.8–5.3)
LYMPHOCYTES NFR BLD AUTO: 27.7 %
MCH RBC QN AUTO: 28.1 PG (ref 26.5–33)
MCHC RBC AUTO-ENTMCNC: 33.7 G/DL (ref 31.5–36.5)
MCV RBC AUTO: 83 FL (ref 78–100)
MONOCYTES # BLD AUTO: 0.3 10E9/L (ref 0–1.3)
MONOCYTES NFR BLD AUTO: 6 %
MUCOUS THREADS #/AREA URNS LPF: PRESENT /LPF
NEUTROPHILS # BLD AUTO: 2.7 10E9/L (ref 1.6–8.3)
NEUTROPHILS NFR BLD AUTO: 64.4 %
NITRATE UR QL: NEGATIVE
NRBC # BLD AUTO: 0 10*3/UL
NRBC BLD AUTO-RTO: 0 /100
OPIATES UR QL SCN: NEGATIVE
PCP UR QL SCN: NEGATIVE
PH UR STRIP: 5.5 PH (ref 5–7)
PLATELET # BLD AUTO: 198 10E9/L (ref 150–450)
POTASSIUM SERPL-SCNC: 3.9 MMOL/L (ref 3.4–5.3)
PROT SERPL-MCNC: 7.1 G/DL (ref 6.8–8.8)
RBC # BLD AUTO: 4.59 10E12/L (ref 3.8–5.2)
RBC #/AREA URNS AUTO: 0 /HPF (ref 0–2)
SODIUM SERPL-SCNC: 139 MMOL/L (ref 133–144)
SOURCE: ABNORMAL
SP GR UR STRIP: 1.02 (ref 1–1.03)
SQUAMOUS #/AREA URNS AUTO: <1 /HPF (ref 0–1)
TROPONIN I SERPL-MCNC: <0.015 UG/L (ref 0–0.04)
UROBILINOGEN UR STRIP-MCNC: NORMAL MG/DL (ref 0–2)
WBC # BLD AUTO: 4.2 10E9/L (ref 4–11)
WBC #/AREA URNS AUTO: 1 /HPF (ref 0–5)

## 2020-06-17 PROCEDURE — 12400001 ZZH R&B MH UMMC

## 2020-06-17 PROCEDURE — 84703 CHORIONIC GONADOTROPIN ASSAY: CPT | Performed by: EMERGENCY MEDICINE

## 2020-06-17 PROCEDURE — 80307 DRUG TEST PRSMV CHEM ANLYZR: CPT | Performed by: EMERGENCY MEDICINE

## 2020-06-17 PROCEDURE — 90791 PSYCH DIAGNOSTIC EVALUATION: CPT

## 2020-06-17 PROCEDURE — 93005 ELECTROCARDIOGRAM TRACING: CPT

## 2020-06-17 PROCEDURE — 99285 EMERGENCY DEPT VISIT HI MDM: CPT | Mod: 25

## 2020-06-17 PROCEDURE — 25800030 ZZH RX IP 258 OP 636: Performed by: EMERGENCY MEDICINE

## 2020-06-17 PROCEDURE — 96360 HYDRATION IV INFUSION INIT: CPT

## 2020-06-17 PROCEDURE — 84484 ASSAY OF TROPONIN QUANT: CPT | Performed by: EMERGENCY MEDICINE

## 2020-06-17 PROCEDURE — 81001 URINALYSIS AUTO W/SCOPE: CPT | Mod: XU | Performed by: EMERGENCY MEDICINE

## 2020-06-17 PROCEDURE — 85025 COMPLETE CBC W/AUTO DIFF WBC: CPT | Performed by: EMERGENCY MEDICINE

## 2020-06-17 PROCEDURE — 80320 DRUG SCREEN QUANTALCOHOLS: CPT | Performed by: EMERGENCY MEDICINE

## 2020-06-17 PROCEDURE — 80053 COMPREHEN METABOLIC PANEL: CPT | Performed by: EMERGENCY MEDICINE

## 2020-06-17 PROCEDURE — 96361 HYDRATE IV INFUSION ADD-ON: CPT

## 2020-06-17 RX ORDER — HYDROXYZINE HYDROCHLORIDE 25 MG/1
25 TABLET, FILM COATED ORAL EVERY 4 HOURS PRN
Status: DISCONTINUED | OUTPATIENT
Start: 2020-06-17 | End: 2020-06-18 | Stop reason: HOSPADM

## 2020-06-17 RX ORDER — SODIUM CHLORIDE 9 MG/ML
1000 INJECTION, SOLUTION INTRAVENOUS CONTINUOUS
Status: DISCONTINUED | OUTPATIENT
Start: 2020-06-17 | End: 2020-06-17 | Stop reason: HOSPADM

## 2020-06-17 RX ORDER — OLANZAPINE 10 MG/2ML
10 INJECTION, POWDER, FOR SOLUTION INTRAMUSCULAR
Status: DISCONTINUED | OUTPATIENT
Start: 2020-06-17 | End: 2020-06-18 | Stop reason: HOSPADM

## 2020-06-17 RX ORDER — ONDANSETRON 4 MG/1
4 TABLET, ORALLY DISINTEGRATING ORAL EVERY 6 HOURS PRN
Status: DISCONTINUED | OUTPATIENT
Start: 2020-06-17 | End: 2020-06-18 | Stop reason: HOSPADM

## 2020-06-17 RX ORDER — ALUMINA, MAGNESIA, AND SIMETHICONE 2400; 2400; 240 MG/30ML; MG/30ML; MG/30ML
30 SUSPENSION ORAL EVERY 4 HOURS PRN
Status: DISCONTINUED | OUTPATIENT
Start: 2020-06-17 | End: 2020-06-18 | Stop reason: HOSPADM

## 2020-06-17 RX ORDER — WATER 10 ML/10ML
INJECTION INTRAMUSCULAR; INTRAVENOUS; SUBCUTANEOUS
Status: DISCONTINUED
Start: 2020-06-17 | End: 2020-06-17 | Stop reason: HOSPADM

## 2020-06-17 RX ORDER — ACETAMINOPHEN 325 MG/1
650 TABLET ORAL EVERY 4 HOURS PRN
Status: DISCONTINUED | OUTPATIENT
Start: 2020-06-17 | End: 2020-06-18 | Stop reason: HOSPADM

## 2020-06-17 RX ORDER — OLANZAPINE 10 MG/1
10 TABLET ORAL
Status: DISCONTINUED | OUTPATIENT
Start: 2020-06-17 | End: 2020-06-18 | Stop reason: HOSPADM

## 2020-06-17 RX ORDER — OLANZAPINE 10 MG/2ML
10 INJECTION, POWDER, FOR SOLUTION INTRAMUSCULAR DAILY PRN
Status: DISCONTINUED | OUTPATIENT
Start: 2020-06-17 | End: 2020-06-17 | Stop reason: HOSPADM

## 2020-06-17 RX ADMIN — SODIUM CHLORIDE 1000 ML: 9 INJECTION, SOLUTION INTRAVENOUS at 11:06

## 2020-06-17 ASSESSMENT — MIFFLIN-ST. JEOR
SCORE: 1397.67
SCORE: 1400.47

## 2020-06-17 ASSESSMENT — ENCOUNTER SYMPTOMS
LIGHT-HEADEDNESS: 1
SHORTNESS OF BREATH: 0
HEADACHES: 0
DIZZINESS: 1

## 2020-06-17 ASSESSMENT — ACTIVITIES OF DAILY LIVING (ADL)
BATHING: 0-->INDEPENDENT
TRANSFERRING: 0-->INDEPENDENT
FALL_HISTORY_WITHIN_LAST_SIX_MONTHS: YES
AMBULATION: 0-->INDEPENDENT
TOILETING: 0-->INDEPENDENT
RETIRED_EATING: 0-->INDEPENDENT
HYGIENE/GROOMING: INDEPENDENT
SWALLOWING: 0-->SWALLOWS FOODS/LIQUIDS WITHOUT DIFFICULTY
DRESS: INDEPENDENT
ORAL_HYGIENE: INDEPENDENT
DRESS: 0-->INDEPENDENT
RETIRED_COMMUNICATION: 0-->UNDERSTANDS/COMMUNICATES WITHOUT DIFFICULTY
NUMBER_OF_TIMES_PATIENT_HAS_FALLEN_WITHIN_LAST_SIX_MONTHS: 1
LAUNDRY: WITH SUPERVISION
WHICH_OF_THE_ABOVE_FUNCTIONAL_RISKS_HAD_A_RECENT_ONSET_OR_CHANGE?: FALL HISTORY
COGNITION: 0 - NO COGNITION ISSUES REPORTED

## 2020-06-17 NOTE — TELEPHONE ENCOUNTER
S:  Pt is a  29 yr old female seen in the [facility].    B:  Info per BARRETT Mack  :   Pt passed out at home and 911 was called.  She reports she took some stra xanax yesterday, about 6 5 mg tabs.  She is elusive about actual suicidally but reports she wanted to sleep and it would be fine if she had .  She reports feeling sad. And she has had thoughts of dying.  Pt reports hearing her neighbors taking about her, saying bad things.  Her sister reports they don't hear anyone talking.  She is delusional and paranoid, having AH.  She was hospitalized here in 2018 and reports she hasn't taken her meds since then.  She lives with her sister and reports they fight a great deal.    She denies chem use other than some alcohol.  utox is negative.  No ongoing chemical issues.  Medically cleared.       Pt is asymptomatic for COVID but refusing the swab.      Pt has not been aggressive with staff but is pounding on the walls.    She is on a [place] at this time.    A:  Needing hospitalization for safety and stabilization.    Patient cleared and ready for behavioral bed placement: Yes

## 2020-06-17 NOTE — ED NOTES
"Patient pounding on window aggressively, states \"I need a , I am going to call 911.\" Wants to know exactly how long she will be in the hospital. Patient is stating she is concerned about her dog and losing her job.   "

## 2020-06-17 NOTE — LETTER
[address] [facility] [address]      June 18, 2020      Patient: Tony Gautam   YOB: 1990   Date of Visit: 6/17/2020       To Whom It May Concern:    Tony Gautam was seen and treated at the [facility] from June 16 2020 - June 17 2020. Please excuse her from work these days. She may return to work on Monday June 22 2020.          Sincerely,     Jeimy Tyler MD

## 2020-06-17 NOTE — ED NOTES
"Patient states she took the zyprexa last night, \"to not feel the way I was feeling. \"I was feeling sad.\" \"I don't think I was trying to kill myself.\"    Patient complains of nausea this morning which has improved.   "

## 2020-06-17 NOTE — ED PROVIDER NOTES
History   Chief Complaint:  Syncope    HPI   Tony Gautam is a 29 year old female brought in by EMS after she apparently had a syncopal event in the elevator in her apartment. Apparently she said she was feeling quite sad and depressed last night, but not truly suicidal. However, she took six tablets of her 5 mg Zyprexa around 2300 last night to help feel better. She indicates she was not trying to kill herself though. She denies any other ingestions. This morning, she was not feeling very well and in fact was actually feeling lightheaded and dizzy. Despite that, she did go and exercise somewhat and was outside exercising as well and trying to walk her dog. Apparently, when she got into the elevator she felt worse and then passed out in the elevator. She denies any injuries from passing out. Apparently her dog had run away when this happened and then she went to go look for him, at which time she ran into a couple of her neighbors who asked if she was okay. She indicated she was not feeling well and laid down. They then called 911. Patient denies any headache, chest pain, shortness of breath, or other symptoms other than feeling nauseous in addition to the lightheadedness and dizziness.     Allergies:  No known drug allergies    Medications:    Ativan  Zyprexa    Past Medical History:    Depressive disorder  Psychosis    Past Surgical History:    History reviewed. No pertinent surgical history.    Family History:    Bipolar disorder  CAD    Social History:  Smoking status: Never  Alcohol use: Yes  Drug use: Never  Marital Status:  Single [1]    Review of Systems   Respiratory: Negative for shortness of breath.    Cardiovascular: Negative for chest pain.   Neurological: Positive for dizziness, syncope and light-headedness. Negative for headaches.   Psychiatric/Behavioral: Negative for self-injury and suicidal ideas.   All other systems reviewed and are negative.    Physical Exam     Patient Vitals for the past  "24 hrs:   BP Temp Temp src Pulse Heart Rate Resp SpO2 Height Weight   06/17/20 1230 -- -- -- -- 63 13 -- -- --   06/17/20 1200 -- -- -- -- 62 13 -- -- --   06/17/20 1130 -- -- -- -- 80 13 -- -- --   06/17/20 1100 111/68 -- -- -- -- -- 100 % -- --   06/17/20 1056 111/68 98.7  F (37.1  C) Oral 69 -- 16 100 % 1.702 m (5' 7\") 63.5 kg (140 lb)       Physical Exam  Nursing note and vitals reviewed.  Constitutional:  Oriented to person, place, and time. Cooperative.   HENT:   Nose:    Nose normal.   Mouth/Throat:   Mucous membranes are normal.   Eyes:    Conjunctivae normal and EOM are normal.      Pupils are equal, round, and reactive to light.   Neck:    Trachea normal.   Cardiovascular:  Normal rate, regular rhythm, normal heart sounds and normal pulses. No murmur heard.  Pulmonary/Chest:  Effort normal and breath sounds normal.   Abdominal:   Soft. Normal appearance and bowel sounds are normal.      There is no tenderness.      There is no rebound and no CVA tenderness.   Musculoskeletal:  Extremities atraumatic x 4.   Lymphadenopathy:  No cervical adenopathy.   Neurological:   Alert and oriented to person, place, and time. Normal strength.      No cranial nerve deficit or sensory deficit. GCS eye subscore is 4. GCS verbal subscore is 5. GCS motor subscore is 6.   Skin:    Skin is intact. No rash noted.   Psychiatric:   Somewhat depression, but denies any ongoing suicidal ideation. Disorganized thinking present and some flight of ideas present.    Emergency Department Course   ECG (11:16:01):  Rate 67 bpm. CA interval 146. QRS duration 76. QT/QTc 402/424. P-R-T axes 39 32 25. Normal sinus rhythm. Normal ECG. Interpreted at 1125 by Kulwant Mosley MD.    Laboratory:  Laboratory findings were communicated with the patient who voiced understanding of the findings.    CBC: WNL (WBC 4.2, HGB 12.9, )    CMP:  (H), o/w WNL(Creatinine 0.90)    Troponin: <0.015    Alcohol ethyl: <0.01    Drug abuse screen 77 " urine: Pending    UA: Pending    HCG Qualitative: Negative     COVID-19 Virus PCR: Pending    Interventions:  1106: NS 1L IV Bolus     Emergency Department Course:  Past medical records, nursing notes, and vitals reviewed.    1108 I performed an exam of the patient as documented above.     EKG obtained in the ED, see results above.   IV was inserted and blood was drawn for laboratory testing, results above.  The patient provided a urine sample here in the emergency department. This was sent for laboratory testing, findings above.    1235 I rechecked the patient and discussed the results of her workup thus far.     1400 The patient was signed out to Dr. Palacios pending placement.    I personally reviewed the laboratory results with the Patient and answered all related questions prior to sign out.     Impression & Plan   Covid-19  Tony Gautam was evaluated during a global COVID-19 pandemic, which necessitated consideration that the patient might be at risk for infection with the SARS-CoV-2 virus that causes COVID-19.   Applicable protocols for evaluation were followed during the patient's care.   COVID-19 was considered as part of the patient's evaluation. The plan for testing is:  a test was obtained during this visit.    Medical Decision Making:  This is a 29-year-old female came in by ambulance after she had what sounds like a syncopal episode today.  However she also took what she says were 6 tablets of her Zyprexa last night because she was depressed.  With regards to the syncope, I do not feel that is something worrisome, but we did proceed with the above work-up to rule out any serious etiologies.  I also felt it was best that she undergo a [facility] assessment, which she agreed to.  Martina from [facility] spoke with the patient at length and then discussed her findings with me.  Martina is very concerned that the patient is acutely psychotic, which I would agree with.  I also think that she is not safe to be  discharged home due to the fact that she is not only acutely psychotic but quite impulsive and likely suicidal.  She indicated to Martina that when she took the Zyprexa last night, she did not care if she woke up or not.  She became quite upset with me when I indicated that I was going to place her on a hold and make her stay.  At this point she actually decompensated quite a bit and became quite angry and agitated.  She was then moved to one of the mental health rooms as well.  She was placed on a health officer hold, and she will likely need to be placed on a 72-hour hold as well.  At this point we are awaiting a bed assignment and accepting physician.  Therefore I will be signing her out to my partner Dr. Palacios.    Diagnosis:    ICD-10-CM    1. Acute psychosis (H)  F23    2. Suicide attempt (H)  T14.91XA    3. Syncope, unspecified syncope type  R55        Disposition:  Signed out to Dr. Palacios.    Scribe Disclosure:  I, Stew Murray, am serving as a scribe at 11:08 AM on 6/17/2020 to document services personally performed by Kulwant Mosley MD based on my observations and the provider's statements to me.        Kulwant Mosley MD  06/17/20 0483

## 2020-06-18 VITALS
BODY MASS INDEX: 23.01 KG/M2 | WEIGHT: 143.2 LBS | HEART RATE: 65 BPM | OXYGEN SATURATION: 100 % | HEIGHT: 66 IN | TEMPERATURE: 97.1 F | DIASTOLIC BLOOD PRESSURE: 73 MMHG | SYSTOLIC BLOOD PRESSURE: 109 MMHG

## 2020-06-18 LAB
ALBUMIN SERPL-MCNC: 3.2 G/DL (ref 3.4–5)
ALP SERPL-CCNC: 46 U/L (ref 40–150)
ALT SERPL W P-5'-P-CCNC: 22 U/L (ref 0–50)
AST SERPL W P-5'-P-CCNC: 14 U/L (ref 0–45)
BILIRUB DIRECT SERPL-MCNC: 0.1 MG/DL (ref 0–0.2)
BILIRUB SERPL-MCNC: 0.5 MG/DL (ref 0.2–1.3)
CHOLEST SERPL-MCNC: 121 MG/DL
DEPRECATED CALCIDIOL+CALCIFEROL SERPL-MC: 22 UG/L (ref 20–75)
HDLC SERPL-MCNC: 47 MG/DL
LDLC SERPL CALC-MCNC: 64 MG/DL
NONHDLC SERPL-MCNC: 74 MG/DL
PROT SERPL-MCNC: 6.5 G/DL (ref 6.8–8.8)
TRIGL SERPL-MCNC: 49 MG/DL
TSH SERPL DL<=0.005 MIU/L-ACNC: 0.87 MU/L (ref 0.4–4)

## 2020-06-18 PROCEDURE — 36415 COLL VENOUS BLD VENIPUNCTURE: CPT | Performed by: STUDENT IN AN ORGANIZED HEALTH CARE EDUCATION/TRAINING PROGRAM

## 2020-06-18 PROCEDURE — 80061 LIPID PANEL: CPT | Performed by: STUDENT IN AN ORGANIZED HEALTH CARE EDUCATION/TRAINING PROGRAM

## 2020-06-18 PROCEDURE — 82306 VITAMIN D 25 HYDROXY: CPT | Performed by: STUDENT IN AN ORGANIZED HEALTH CARE EDUCATION/TRAINING PROGRAM

## 2020-06-18 PROCEDURE — 99239 HOSP IP/OBS DSCHRG MGMT >30: CPT | Mod: GC | Performed by: PSYCHIATRY & NEUROLOGY

## 2020-06-18 PROCEDURE — 80076 HEPATIC FUNCTION PANEL: CPT | Performed by: STUDENT IN AN ORGANIZED HEALTH CARE EDUCATION/TRAINING PROGRAM

## 2020-06-18 PROCEDURE — 84443 ASSAY THYROID STIM HORMONE: CPT | Performed by: STUDENT IN AN ORGANIZED HEALTH CARE EDUCATION/TRAINING PROGRAM

## 2020-06-18 RX ORDER — HALOPERIDOL 1 MG/1
1 TABLET ORAL
Qty: 30 TABLET | Refills: 0 | Status: ON HOLD | OUTPATIENT
Start: 2020-06-18 | End: 2021-09-09

## 2020-06-18 ASSESSMENT — ACTIVITIES OF DAILY LIVING (ADL)
DRESS: INDEPENDENT
ORAL_HYGIENE: INDEPENDENT
HYGIENE/GROOMING: INDEPENDENT
LAUNDRY: WITH SUPERVISION

## 2020-06-18 ASSESSMENT — MIFFLIN-ST. JEOR: SCORE: 1392.3

## 2020-06-18 NOTE — PROGRESS NOTES
06/17/20 2206   Patient Belongings   Did you bring any home meds/supplements to the hospital?  No   Patient Belongings locker   Patient Belongings Put in Hospital Secure Location (Security or Locker, etc.) clothing;keys;shoes   Belongings Search Yes   Clothing Search Yes   Second Staff Kae CAZARES     Shoes-1 pair   Sweatshirt-1   Pants-1 pair   Set of keys     A               Admission:  I am responsible for any personal items that are not sent to the safe or pharmacy.  [facility] is not responsible for loss, theft or damage of any property in my possession.    Signature:  _________________________________ Date: _______  Time: _____                                              Staff Signature:  ____________________________ Date: ________  Time: _____      2nd Staff person, if patient is unable/unwilling to sign:    Signature: ________________________________ Date: ________  Time: _____     Discharge:  [facility] has returned all of my personal belongings:    Signature: _________________________________ Date: ________  Time: _____                                          Staff Signature:  ____________________________ Date: ________  Time: _____

## 2020-06-18 NOTE — ED NOTES
[facility] here to take pt to [facility] - Staff at [facility] made aware. Pt made aware she is on 72 hour hold, pt rights given.

## 2020-06-18 NOTE — PROGRESS NOTES
"Pt checked in with staff, pt was pleasant and calm during interaction with writer. Pt reported, \"I honestly don't feel like I need to be here, I did have some anxiety, but I was not intending to harm myself or kill myself\". Pt reported \"I prefer to do things naturally, and not take medications. I have been seeing a psychologist, but my doctor said they'd rather me see a psychiatrist, but I can't afford that right now\". Pt reported wanting to discharge, \"I need to study for a certification I have to complete for work\". Pt reported she would be willing to take something \"mild\" in terms of medication, but doesn't want to use medications long term. Pt denied any SI/SIB/HI, denies AH/VH.      06/18/20 1244   Behavioral Health   Hallucinations denies / not responding to hallucinations   Thinking intact   Orientation place: oriented;date: oriented;time: oriented;person: oriented   Memory baseline memory   Insight poor   Judgement impaired   Eye Contact at examiner   Affect blunted, flat   Mood mood is calm   Physical Appearance/Attire attire appropriate to age and situation   Hygiene well groomed   Suicidality other (see comments)  (pt denies)   1. Wish to be Dead (Recent) No   2. Non-Specific Active Suicidal Thoughts (Recent) No   3. Active Sucidal Ideation with any Methods (Not Plan) Without Intent to Act (Recent) No   4. Active Suicidal Ideation with Some Intent to Act, Without Specific Plan (Recent) No   5. Active Suicidal Ideation with Specific Plan and Intent (Recent) No     "

## 2020-06-18 NOTE — PLAN OF CARE
"Admission:     Admitted to  22 on a 72 hour hold after experiencing a syncopal episode earlier today and out of concern for acute psychosis and possible suicidal behavior. Pt states she took 5-6 tabs of 5mg Zyprexa last night, but denies it was a suicide attempt. States, \"I had a lot more I could take if that was the case\". Reports one past attempt, secondary to acute psychosis when 1st hospitalized approximately 2.5 years ago in January 2018 where she was having hallucinations, delusions and paranoia, and thought others were trying to kill her. She does report intermittent psychotic symptoms including auditory hallucinations she describes as chatter, that she can hear while at home. States she has asked friends and others if they also hear them, and is always told they do not. She reports feeling paranoid at work that others are talking about her. She denies current suicidal ideation. She states she currently takes no prescriptions. She voices that she does not want to be in the hospital, and worries, that despite having insurance, the cost is prohibitive. States, \"I'm still trying to pay off the bill from the last time I was here\". (See chart review for further details and hx)    Presents as calm, polite, and cooperative completing admission despite being upset about hold, and being brought in involuntarily. She is well groomed, good eye contact. Speech is clear and coherent. She is guarded at first, but open answering questions. She states she has had one other syncopal episode last fall. Denies other specific stressors.Offered meal, pt declines. Encouraged to voice needs, questions, and concerns. Pt verbalizes understanding.   "

## 2020-06-18 NOTE — PHARMACY-ADMISSION MEDICATION HISTORY
Admission medication history interview status for the 6/17/2020 admission is complete. See Epic admission navigator for allergy information, pharmacy, prior to admission medications and immunization status.     Medication history interview sources:  Patient, Surescripts prescription fill history    Additional medication history information (including reliability of information, actions taken by pharmacist):  -Patient declines regularly taking any medications including OTC medications    Prior to Admission medications    Not on File       Medication history completed by:   Talia Rosario, Pharm D  June 18, 2020

## 2020-06-18 NOTE — DISCHARGE INSTRUCTIONS
Behavioral Discharge Planning and Instructions      Summary:  You were admitted on 6/17/2020  due to Depression, Anxiety and Psychotic Disorder NOS.  You were treated by Dr. Donna Larry MD and discharged on 6/18/2020* from [facility] to Home      Principal Diagnosis:   Major depressive disorder with psychotic symptoms vs. Schizoaffective disorder, depressed type   Anxiety     Health Care Follow-up Appointments:   Please call your Primary Care Doctor at [facility] to make a follow up appt with your Primary care doctor in ~ 2 weeks.   Maria Elean Bowman PA-C    [address] [place] [address]    Phone: [phone]     OR    Carlita Torres PA-C   [phone] (Work)  [phone] (Fax)  [address] [place] [address]      Walk-In [facility]  [facility] [place], [place]   Phone: [phone]  Hours:  Monday, Wednesday, Friday - 1pm-3pm  Monday-Thursday - 6:30pm-8:30pm    Attend all scheduled appointments with your outpatient providers. Call at least 24 hours in advance if you need to reschedule an appointment to ensure continued access to your outpatient providers.   Major Treatments, Procedures and Findings:  You were provided with: {Major Treatments:717474}    Symptoms to Report: feeling more aggressive, increased confusion, losing more sleep, mood getting worse or thoughts of suicide    Early warning signs can include: increased depression or anxiety sleep disturbances increased thoughts or behaviors of suicide or self-harm  increased unusual thinking, such as paranoia or hearing voices    Safety and Wellness:  Take all medicines as directed.  Make no changes unless your doctor suggests them.      Follow treatment recommendations.  Refrain from alcohol and non-prescribed drugs.  Ask your support system to help you reduce your access to items that could harm yourself or others. If there is a concern for safety, call 151.    Resources:   National [place] on Mental  Illness (www.[place].trace.org): 937-471-2162 or 328-462-1719.  National Suicide Prevention Line (www.mentalhealthmn.org): 051-078-WXFF (3815)  Mental Health Association of [place] (www.mentalhealth.org): 665.832.1450 or 014-711-3586  [facility] (COPE) Response - Adult [phone]    Lifestyle Adjustment:   1. Adjust your lifestyle to get enough sleep, relaxation, exercise and good nutrition.  Continue to develop healthy coping skills to decrease stress and promote a healthy  lifestyle.  2. Abstain from all substances of abuse.  3. Take medications as prescribed.  Please work with your doctor to discuss any concerns you have with your medications or side effects you may be experiencing.  4. Follow up with appointments as scheduled.      General Medication Instructions:   1. See your medication sheet(s) for instructions.   2. Take all medicines as directed.  Make no changes unless your doctor suggests them.   3. Go to all your doctor visits.  4. Be sure to have all your required lab tests. This way, your medicines can be refilled on time.  5. Do not use any drugs not prescribed by your doctor.      The treatment team has appreciated the opportunity to work with you.     Bertha,  please take care and make your recovery a daily recovery. If you would like to obtain any specific documentation regarding your hospitalization after your discharge, contact [facility] of Information/Medical Records:  [phone]

## 2020-06-18 NOTE — PLAN OF CARE
Problem: Adult Behavioral Health Plan of Care  Goal: Patient-Specific Goal (Individualization)  Flowsheets (Taken 6/18/2020 1048)  Patient Personal Strengths:   interests/hobbies   intellectual cognitive skills   expressive of needs  Patient Vulnerabilities: Hx of treatment non adherence  Personal Plan of Care    Reasons you are in the Hospital  Didn't have a reason  2.  3.  4.    Goals for Discharge   1.  2.  3.
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
## 2020-06-18 NOTE — PROGRESS NOTES
Work Completed: Chart review.  Met with patient to complete initial psychosocial assessment and personal plan of care.  Did not participate in Team meeting as was providing coverage on another unit.  AVS started-patient verbalized preference for case management referrals/resources that are free    Discharge plan or goal: TBD                Barriers to discharge: Symptom stabilization, safe discharge plan

## 2021-07-30 NOTE — ED TRIAGE NOTES
Patient brought in by EMS from home. Patient had a syncopal episode in her elevator. Patient went to manager and told him what happened and manager called 911. Patient reports she took 6 zyprexa last night to help her feel mbetter. States she was not trying to hurt herself.  
[de-identified] : 60 y/o male with right forearm numbness. \par \par Patient has numbness to the ulnar aspect of the forearm that may be consistent with transient compression of the medial antebrachial cutaneous nerve.  The patient also has some local ulnar nerve subluxation without evidence of ulnar nerve dysfunction. We discussed that the numbness is likely positional, but if it persists for 2 weeks, would refer patient to neurology for further evaluation.  \par \par Recommendation: Observation, activity to tolerance.\par \par Follow-up 2 weeks

## 2021-09-08 ENCOUNTER — TRANSFERRED RECORDS (OUTPATIENT)
Dept: HEALTH INFORMATION MANAGEMENT | Facility: CLINIC | Age: 31
End: 2021-09-08

## 2021-09-08 ENCOUNTER — TELEPHONE (OUTPATIENT)
Dept: BEHAVIORAL HEALTH | Facility: CLINIC | Age: 31
End: 2021-09-08

## 2021-09-08 ENCOUNTER — HOSPITAL ENCOUNTER (INPATIENT)
Facility: CLINIC | Age: 31
LOS: 13 days | Discharge: HOME | DRG: 885 | End: 2021-09-21
Attending: PSYCHIATRY & NEUROLOGY | Admitting: PSYCHIATRY & NEUROLOGY
Payer: COMMERCIAL

## 2021-09-08 DIAGNOSIS — F20.0 PARANOID SCHIZOPHRENIA (H): ICD-10-CM

## 2021-09-08 DIAGNOSIS — S92.002D CLOSED NONDISPLACED FRACTURE OF LEFT CALCANEUS WITH ROUTINE HEALING, UNSPECIFIED PORTION OF CALCANEUS, SUBSEQUENT ENCOUNTER: Primary | ICD-10-CM

## 2021-09-08 DIAGNOSIS — R45.851 SUICIDAL IDEATION: ICD-10-CM

## 2021-09-08 PROBLEM — F29 PSYCHOSIS (H): Status: ACTIVE | Noted: 2018-10-28

## 2021-09-08 PROCEDURE — 124N000002 HC R&B MH UMMC

## 2021-09-08 PROCEDURE — 250N000013 HC RX MED GY IP 250 OP 250 PS 637

## 2021-09-08 RX ORDER — ACETAMINOPHEN 325 MG/1
650 TABLET ORAL EVERY 4 HOURS PRN
Status: DISCONTINUED | OUTPATIENT
Start: 2021-09-08 | End: 2021-09-21 | Stop reason: HOSPADM

## 2021-09-08 RX ORDER — OLANZAPINE 2.5 MG/1
2.5 TABLET, FILM COATED ORAL AT BEDTIME
Status: DISCONTINUED | OUTPATIENT
Start: 2021-09-08 | End: 2021-09-10

## 2021-09-08 RX ORDER — MAGNESIUM HYDROXIDE/ALUMINUM HYDROXICE/SIMETHICONE 120; 1200; 1200 MG/30ML; MG/30ML; MG/30ML
30 SUSPENSION ORAL EVERY 4 HOURS PRN
Status: DISCONTINUED | OUTPATIENT
Start: 2021-09-08 | End: 2021-09-21 | Stop reason: HOSPADM

## 2021-09-08 RX ORDER — OLANZAPINE 10 MG/2ML
10 INJECTION, POWDER, FOR SOLUTION INTRAMUSCULAR 3 TIMES DAILY PRN
Status: DISCONTINUED | OUTPATIENT
Start: 2021-09-08 | End: 2021-09-21 | Stop reason: HOSPADM

## 2021-09-08 RX ORDER — OLANZAPINE 10 MG/1
10 TABLET ORAL 3 TIMES DAILY PRN
Status: DISCONTINUED | OUTPATIENT
Start: 2021-09-08 | End: 2021-09-21 | Stop reason: HOSPADM

## 2021-09-08 RX ORDER — LEVONORGESTREL/ETHIN.ESTRADIOL 0.1-0.02MG
1 TABLET ORAL DAILY
COMMUNITY

## 2021-09-08 RX ORDER — TRAZODONE HYDROCHLORIDE 50 MG/1
50 TABLET, FILM COATED ORAL
Status: DISCONTINUED | OUTPATIENT
Start: 2021-09-08 | End: 2021-09-21 | Stop reason: HOSPADM

## 2021-09-08 RX ORDER — HYDROXYZINE HYDROCHLORIDE 25 MG/1
25 TABLET, FILM COATED ORAL EVERY 4 HOURS PRN
Status: DISCONTINUED | OUTPATIENT
Start: 2021-09-08 | End: 2021-09-21 | Stop reason: HOSPADM

## 2021-09-08 RX ORDER — AMOXICILLIN 250 MG
1 CAPSULE ORAL 2 TIMES DAILY PRN
Status: DISCONTINUED | OUTPATIENT
Start: 2021-09-08 | End: 2021-09-21 | Stop reason: HOSPADM

## 2021-09-08 RX ORDER — HALOPERIDOL 1 MG/1
1 TABLET ORAL
Status: DISCONTINUED | OUTPATIENT
Start: 2021-09-08 | End: 2021-09-08

## 2021-09-08 RX ADMIN — OLANZAPINE 10 MG: 10 TABLET, FILM COATED ORAL at 18:02

## 2021-09-08 RX ADMIN — ACETAMINOPHEN 650 MG: 325 TABLET, FILM COATED ORAL at 17:03
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
## 2021-09-08 NOTE — TELEPHONE ENCOUNTER
1200pm - Chantell accepts, pending the 72 HH was signed in the last 24 hours. Intake unable to find original clinical in fax. Chantell requested the MRN be text paged to him  1201pm - Chantell paged MRN information

## 2021-09-09 ENCOUNTER — APPOINTMENT (OUTPATIENT)
Dept: GENERAL RADIOLOGY | Facility: CLINIC | Age: 31
DRG: 885 | End: 2021-09-09
Attending: PSYCHIATRY & NEUROLOGY
Payer: COMMERCIAL

## 2021-09-09 PROCEDURE — 250N000013 HC RX MED GY IP 250 OP 250 PS 637

## 2021-09-09 PROCEDURE — 73610 X-RAY EXAM OF ANKLE: CPT | Mod: 26 | Performed by: RADIOLOGY

## 2021-09-09 PROCEDURE — 73610 X-RAY EXAM OF ANKLE: CPT | Mod: LT

## 2021-09-09 PROCEDURE — 73630 X-RAY EXAM OF FOOT: CPT | Mod: 26 | Performed by: RADIOLOGY

## 2021-09-09 PROCEDURE — 90853 GROUP PSYCHOTHERAPY: CPT

## 2021-09-09 PROCEDURE — 124N000002 HC R&B MH UMMC

## 2021-09-09 PROCEDURE — 73630 X-RAY EXAM OF FOOT: CPT | Mod: LT

## 2021-09-09 PROCEDURE — 99223 1ST HOSP IP/OBS HIGH 75: CPT | Mod: AI | Performed by: PSYCHIATRY & NEUROLOGY

## 2021-09-09 PROCEDURE — 73650 X-RAY EXAM OF HEEL: CPT | Mod: LT

## 2021-09-09 PROCEDURE — 73650 X-RAY EXAM OF HEEL: CPT | Mod: 26 | Performed by: RADIOLOGY

## 2021-09-09 RX ORDER — IBUPROFEN 200 MG
600 TABLET ORAL EVERY 6 HOURS PRN
Status: DISCONTINUED | OUTPATIENT
Start: 2021-09-09 | End: 2021-09-21 | Stop reason: HOSPADM

## 2021-09-09 RX ORDER — RISPERIDONE 1 MG/1
1 TABLET, ORALLY DISINTEGRATING ORAL AT BEDTIME
Status: DISCONTINUED | OUTPATIENT
Start: 2021-09-09 | End: 2021-09-13

## 2021-09-09 RX ADMIN — ACETAMINOPHEN 650 MG: 325 TABLET, FILM COATED ORAL at 01:53

## 2021-09-09 NOTE — PROGRESS NOTES
Tylenol 650 mg q4hrs prn given at 0155 for fractured left ankle pain.  Declines another cold pack at this time but elevating foot and ankle on pillow and has boot on left foot and ankle.  Will continue to monitor and support patient who is currently on SIO staffing.  Calm and cooperative.

## 2021-09-09 NOTE — PHARMACY-ADMISSION MEDICATION HISTORY
Admission Medication History Completed by Pharmacy    See [facility] Admission Navigator for allergy information, preferred outpatient pharmacy, prior to admission medications and immunization status.     Medication History Sources:     [facility] Everywhere    Dispense report    Changes made to PTA medication list (reason):    Added: None    Deleted: Haldol    Changed: None    Additional Information:    Tony confirmed she is taking a birth control medication but did not remember the name of it.    Prior to Admission medications    Medication Sig Last Dose Taking? Auth Provider   levonorgestrel-ethinyl estradiol (AVIANE) 0.1-20 MG-MCG tablet Take 1 tablet by mouth daily  Yes Unknown, Entered By History        The information provided in this note is only as accurate as the sources available at the time of the update(s).    Date completed: 09/09/21    Medication history completed by: Monie Ramirez RPH

## 2021-09-09 NOTE — PROGRESS NOTES
A               Admission:  I am responsible for any personal items that are not sent to the safe or pharmacy.  [facility] is not responsible for loss, theft or damage of any property in my possession. In addition to her belongings Pt sister brought in [place]  licenses and [place] card. Both are store in locker #2     Signature:  _________________________________ Date: _______  Time: _____                                              Staff Signature:  ____________________________ Date: ________  Time: _____      2nd Staff person, if patient is unable/unwilling to sign:    Signature: ________________________________ Date: ________  Time: _____     Discharge:  [facility] has returned all of my personal belongings:    Signature: _________________________________ Date: ________  Time: _____                                          Staff Signature:  ____________________________ Date: ________  Time: _____

## 2021-09-09 NOTE — PLAN OF CARE
Tony appeared to sleep a total of 4.75 hours this night shift.  She awoke during the night with pain in her left foot and ankle.  She was given Tylenol 650 mg at 0200 but declined another cold pack for comfort and any swelling.  She is elevating her left foot and appears comfortable and sleeping at this time.  Remains on SIO staffing.

## 2021-09-09 NOTE — PLAN OF CARE
Re - legal -     Petition for commitment ([place]) - with [employer] Request    Spoke with Maricruz in [facility]  Intake (ph: [phone]) discussed petition - Maricruz was able to verify that patient's address is [facility] -   Per Maricruz documents needed include Examiner's Statement in Support of Petition, hospital facesheet, copy of 72 hour hold and H&P . To be faxed to [facility] attn General Intake - fax: [phone]    [employer] Petition information is to be faxed to [facility] Court Administration at fax: [phone]      Unit contact information (main phone and fax) provided along with this writer's direct contact.       Documents faxed to [facility]  as requested and [employer]io warner and note faxed to court as directed.     Addendum :  Friday 9/10/2021 - ETA 9:30am/10:00am [facility]  Suzette to come to unit for screening/meet with patient     - message received from Suzette that she is assigned to screening and plans to come to the unit sometime between 9:30 and 10:00am tomorrow -

## 2021-09-09 NOTE — PLAN OF CARE
BEHAVIORAL TEAM DISCUSSION    Participants:   Chip Phillips MD Attending Psychiatrist  Mayco Frazier MD, PGY1  Jaye Farmer MSW, [facility] Clinical Treatment Coordinator  MERLIN Van MS3  Progress: initial team meeting   Anticipated length of stay: assessment ongoing and pending outcome of petition for committment   Continued Stay Criteria/Rationale: admitted for treatment of acute psychosis  Medical/Physical: Per psychiatry physician progress note:   Medical diagnoses of concern this admission:   # 1. Comminuted and impacted calcaneal fracture.   -Orthopedic consult placed.  -Tylenol 650 mg PO Q4 hrs PRN for mild-moderate pain  - Ibuprofen 600 mg Q6hrs PRN for moderate pain     Precautions:   Behavioral Orders   Procedures    Code 1 - Restrict to Unit    Code 2     For ankle xray    Elopement precautions    Routine Programming     As clinically indicated    Self Injury Precaution    Status 15     Every 15 minutes.    Status Individual Observation     Extreme disorganization and intrusiveness    Patient SIO status reviewed with team/RN.  Please also refer to RN/team documentation for add'l detail.     Order Specific Question:   CONTINUOUS 24 hours / day     Answer:   Other     Order Specific Question:   Specify distance     Answer:   10 ft     Order Specific Question:   Indications for SIO     Answer:   Severe intrusiveness     Plan: Psychiatric assessment. Medication management. Therapeutic Milieu. Individual care planning and after care planning. Patient to participate in unit groups and activities. Individual and group support on unit. patient Is admitted on a 72 hour hold given dangerous behaviors in the community secondary to psychosis/paranoia, desire for immediate discharge, declining of recommended treatment and history of not engaging with mental health follow up after previous episodes/hospitalization a petition for MI commitment and Greenberg is being pursued through [facility].    Rationale for change in precautions or plan: per initial assessment.

## 2021-09-09 NOTE — PLAN OF CARE
"Initial Psychosocial Assessment    I have reviewed the chart, met with the patient, and developed Care Plan.  Information for assessment was obtained from:     Patient: team interview and Chart: review of outside ED records, recent and historical encounters    Presenting Problem:  patient is admitted on a 72 hour hold due to acute psychosis. She presented to [place] ED for assessment after police were called to her home by family due to her increasing paranoia and psychosis. She had expressed fear that persons were after she and her sister. Per DEC assessment police were called to the home two times - the first time is reported to have said \"I think me and you should end our lives beofre these people get us.\" Family called police after this statement and it is reported when they arrived she denied any intern to harm herself and they left. patient then was reported to be wielding a knife (as believed needed to protect herself and her sister) and at one point was able to access one of their father's guns that was in the home. Police were again called to the home and she was then transported to the ED.    She had presented voluntarily to the same ED on 9/6/2021 for evaluation reported concerns about believing there was someone in the attic and wanting to take pets and sister to someone royal's house. She was discharged home with follow-up mental health appointments scheduled.     Today in meeting with the treatment team Tony says she had accessed someone else's gun because she was worried about her sister and thought she needed to protect themselves and their home as \"anyone would\".  She denies any current mental health symptoms , is focused on desire to be discharged as soon as possible. She asks multiple times how long she will need to be in the hospital and the duration of the initial 72 hour hold is explained to her and was also informed that would be pursuing screening through the [facility] petition. She " "asks more than once what she could do to get out of the hospital. The Psychiatrist encouraged her to take medications , she says \"I don't want to take any medication\" when asked about previous medication she says \"it made me tired\".     Today she had called 911 from a unit phone (see earlier note) and had stated she called because she is worried about her sister. She has declined to give permission for the treatment team to contact her sister.  She also tells us today in the team interview that she is worried about her sister because her sister's behavior has been different.     History of Mental Health and Chemical Dependency:    Mental health history includes at least 3 past known mental health admissions - all admission included presentation with symptoms of psychosis. During her past admissions that occurred within this health system she had been referred to psychiatry follow up and is reported to have not attended scheduled follow up nor continued with prescribed medications.    - 6/17/2020 - 6/18/2020 (20 hours) [facility]- [address] Dr. Larry ([facility])  [facility]: Principal psychiatric diagnosis: # MDD with psychotic features vs  Schizophrenia spectrum, schizoaffective disorder    - 10/28/2018 - 11/1/2018 (4 days) [facility] - [address] Dr. 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 A primary psychotic disorder (schizophrenia vs schizoaffective disorder) is not ruled out at this time.      - 01/23/2018 - 01/31/2018  [facility] -   Per Psychiatry Dishcarge Summary 1/31/2018   DISCHARGE DIAGNOSES:       1.  Psychotic disorder, unspecified.  Substance-induced psychotic disorder versus major depressive disorder with psychotic features.    2.  Alcohol use disorder.  "   3.  Cannabis use disorder.        Family Description (Constellation, Family Psychiatric History):  patient and her biological sister (twin sister) were adopted as newborns and patient is not aware of biological family history . (per 2020 H&P)    Significant Life Events (Illness, Abuse, Trauma, Death):  Past notes    Living Situation:  Lives with her sister in [place]     Educational Background:  Per chart review:  She reports she graduated from [employer] in [place].  She attended 1 year at [employer].       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    Financial Status:  Further assessment needed    Legal Issues:  No known legal issues.  Is admitted on a 72 hour mental health hold    Ethnic/Cultural Issues:       Spiritual Orientation:        Service History:  None known    Social Functioning (organization, interests):  Unable to elaborate /discuss currently.     Current Treatment Providers are:  No mental health providers.     Has been scheduled for psychiatry following past admissions  - has not followed up     Primary Care - does not appear established - further assessment is needed.   8/9/2021 Last visible Primary Care encounter in [facility] is via [facility] ([facility] [employer]) [address]  [place] [address]   [phone]      Social Service Assessment/Plan:  Patient has been admitted for treatment of acute psychosis.. Patient will have psychiatric assessment and medication management by the psychiatrist. Medications will be reviewed and adjusted per MD as indicated. The treatment team will continue to assess and stabilize the patient's mental health symptoms with the use of medications and therapeutic programming. Hospital staff will provide a safe environment and a therapeutic milieu. Staff will continue to assess  patient as needed. Patient will participate in unit groups and activities. Patient will receive individual and group support on the unit.  CTC will do individual inpatient treatment planning and after care planning. CTC will discuss options for increasing community supports with the patient. [facility] will coordinate with outpatient providers and will place referrals to ensure appropriate follow up care is in place.    Given acuity of presenting psychosis a petition for commitment (MI) with Greenberg request is being filed with [facility] .

## 2021-09-09 NOTE — CONSULTS
[facility] Orthopedic Surgery Consultation    Tony Gautam MRN# [id number]   Age: 30 year old YOB: 1990   Date of Admission: 9/8/2021    Reason for consult: Left calcaneus fracture   Requesting physician: Chip Macedo*   Level of consult: One-time consult to assist in determining a diagnosis, recommend an appropriate treatment plan and place orders          Assessment and Plan:   Assessment:  Tony Gautam is a 30 year old female with PMH significant for psychosis currently admitted to the mental health unit who orthopedic surgery was consulted on regarding a closed left calcaneus fracture.    The patient has a significant calcaneus fracture that closed but comminuted with intra-articular involvement.  Standard of care treatment for this injury at this stage would be a CT scan to further evaluate the fracture pattern as well as placement of a bulky Mykel Montoya splint with close clinical follow-up for possible operative fixation.  This was reviewed with the patient.  She was adamant against any further treatment for her injury despite thoroughly discussing the above recommendations.  On multiple attempts, I tried to explain the rationale behind at least applying the splint today and she could hold off on the CT scan until a later date.  However, again she was adamant that she did not want anything done and that she would follow-up with her regular doctor on an outpatient basis.  I explained the need for orthopedics in this case.  Talking with nursing, she has been acutely psychotic since admission to the mental health unit with no real insight into her situation.  Discussed the risk of not treating this injury would be continued pain, possible skin breakdown and infection, worsening of the fracture, posttraumatic arthritis, and inability to weight-bear.  She expressed an understanding of these risks and further denied treatment.  Orthopedic surgery will remain available if she changes her  mind.  Please keep splint material on the unit until she discharges in case this happens.     Plan:  - NWB LLE   - Recommended splint application but patient repeatedly denied  - Recommended CT scan of the calcaneus but patient repeatedly denied  - Recommended we schedule outpatient follow up with our orthopedic surgery group but the patient repeatedly denied  - Ortho to remain available should the patient change her mind in regard to any of the above recommendations    Discussed with orthopedic surgery staff Dr. Alexander.    --  Ubaldo Esparza MD  Orthopedic Surgery PGY-1  [id number]         History of Present Illness:   Tony Gautam is a 30 year old female with an extensive psychiatric history currently in the mental health unit who orthopedic surgery was consulted on regarding a left calcaneus fracture. Patient reports that she was running after her sister when she fell down a set a stairs. She reports being unable to bear weight on her left foot or walk after this incident. Per chart review she was apprehended by the police as her sister was concerned for her safety and the patient's. She was initially evaluated at an outside ED where the calcaneus fracture was identified and she was placed in a postop shoe. She denies having much pain in the ankle unless she tries to move it too much or walk on it. She denies prior history of injuries or surgeries to this extremity. No numbness.     A 10 point review of systems was otherwise negative other than as noted in history above.         Past Medical History:     Past Medical History:   Diagnosis Date     Depressive disorder           Past Surgical History:   No past surgical history on file.         Social History:     Social History     Socioeconomic History     Marital status: Single     Spouse name: Not on file     Number of children: Not on file     Years of education: Not on file     Highest education level: Not on file   Occupational History     Not on file   Tobacco  Use     Smoking status: Never Smoker     Smokeless tobacco: Never Used   Substance and Sexual Activity     Alcohol use: Yes     Drug use: Never     Sexual activity: Not on file   Other Topics Concern     Not on file   Social History Narrative     Not on file     Social Determinants of Health     Financial Resource Strain:      Difficulty of Paying Living Expenses:    Food Insecurity:      Worried About Running Out of Food in the Last Year:      Ran Out of Food in the Last Year:    Transportation Needs:      Lack of Transportation (Medical):      Lack of Transportation (Non-Medical):    Physical Activity:      Days of Exercise per Week:      Minutes of Exercise per Session:    Stress:      Feeling of Stress :    Social Connections:      Frequency of Communication with Friends and Family:      Frequency of Social Gatherings with Friends and Family:      Attends Jew Services:      Active Member of Clubs or Organizations:      Attends Club or Organization Meetings:      Marital Status:    Intimate Partner Violence:      Fear of Current or Ex-Partner:      Emotionally Abused:      Physically Abused:      Sexually Abused:           Family History:   No family history on file.    Patient denies known family history of bleeding, clotting, or anesthesia-related complications.            Allergies:   No Known Allergies          Medications:     Prior to Admission medications    Medication Sig Last Dose Taking? Auth Provider   levonorgestrel-ethinyl estradiol (AVIANE) 0.1-20 MG-MCG tablet Take 1 tablet by mouth daily  Yes Unknown, Entered By History   haloperidol (HALDOL) 1 MG tablet Take 1 tablet (1 mg) by mouth nightly as needed for other (severe anxiety, auditory hallucinations)   Donna Larry MD            Physical Exam:     Vitals:    09/08/21 1714   BP: 124/83   BP Location: Left arm   Pulse: 75   Resp: 16   Temp: 98.9  F (37.2  C)   TempSrc: Oral   SpO2: 100%     General: alert and oriented, not  overtly psychotic, answers questions but does not have insight   Neuro: EOM grossly intact  HEENT: atraumatic  Lungs: breathing comfortably on RA  Heart/Cardiovascular: well perfused    Left Lower Extremity:   - Left ankle swollen with ecchymosis over the dorsum of the foot and around the heel region, but with positive wrinkling. Skin completely intact with no areas of breakthrough.  - Actively dorsi and plantar flexes her ankle though limited secondary to pain and swelling. Wiggles toes.  - SILT superficial peroneal, deep peroneal, saphenous, sural, and tibial nerve distributions.  - DP pulses palpable, toes warm and well perfused.            Imaging:   All imaging independently reviewed.     Xrays of the left ankle, calcaneus, and foot were reviewed which demonstrate a significantly comminuted calcaneus fracture with intraarticular involvement.         Labs:   CBC:  Lab Results   Component Value Date    WBC 4.2 06/17/2020    HGB 12.9 06/17/2020     06/17/2020       BMP:  Lab Results   Component Value Date     06/17/2020    POTASSIUM 3.9 06/17/2020    CHLORIDE 109 06/17/2020    CO2 27 06/17/2020    BUN 13 06/17/2020    CR 0.90 06/17/2020    ANIONGAP 3 06/17/2020    ALONA 8.6 06/17/2020     (H) 06/17/2020       Inflammatory Markers:  Lab Results   Component Value Date    WBC 4.2 06/17/2020    SED 6 11/01/2018

## 2021-09-09 NOTE — PROGRESS NOTES
09/08/21 2054   Patient Belongings   Did you bring any home meds/supplements to the hospital?  No   Patient Belongings remains with patient;locker   Patient Belongings Remaining with Patient glasses   Patient Belongings Put in Hospital Secure Location (Security or Locker, etc.) clothing   Belongings Search Yes   Clothing Search No   Second Staff Alyson Llamas     Pt belonging include grey leggings, shirt, and glasses.     No valuables to security.     A               Admission:  I am responsible for any personal items that are not sent to the safe or pharmacy.  [facility] is not responsible for loss, theft or damage of any property in my possession.    Signature:  _________________________________ Date: _______  Time: _____                                              Staff Signature:  ____________________________ Date: ________  Time: _____      2nd Staff person, if patient is unable/unwilling to sign:    Signature: ________________________________ Date: ________  Time: _____     Discharge:  [facility] has returned all of my personal belongings:    Signature: _________________________________ Date: ________  Time: _____                                          Staff Signature:  ____________________________ Date: ________  Time: _____

## 2021-09-09 NOTE — PLAN OF CARE
Problem: Cognitive Impairment (Psychotic Signs/Symptoms)  Goal: Optimal Cognitive Function (Psychotic Signs/Symptoms)  Outcome: No Change  Note: Patient is extremely disorganized and distracted. Reports pain In L ankle but declines any intervention. She sits in the wheelchair parked in the doorway. She is focused only on being discharged from here. Appetite is poor. Ate only one bite for breakfast. She was very reluctant to go down for xray of ankle. Insisted she did not see there was a problem and that she would get it check out once she was discharged. Patient did call 911. Accepted redirection. Declines any medication. Tania Angulo RN

## 2021-09-09 NOTE — PROGRESS NOTES
"  ----------------------------------------------------------------------------------------------------------  [facility], [place]   Psychiatric Progress Note  Hospital Day #1     Interim History:   The patient's care was discussed with the treatment team and chart notes were reviewed.    Sleep 4.75 hours (09/09/21 0525)  Scheduled Medications: Declined Olanzapine 2.5 mg last evening.  PRN medications: Acetaminophen for ankle pain @ 1703, 0153.Olanzapine @ 1802, agitation.    Staff Report:     \"Pt is mostly unable to communicate effectively at this time. She appears to be dozing between answering questions and is having a hard time tracking conversation. When asked, pt reported she thought she was at [facility]. Writer re-oriented pt to reality. Pt repeats, \"I'm just scared.\" and \"I'm confused\" several times. She is highly paranoid and guarded, but warned up to writer slightly. She is unable to articulate what is scaring her, but says, \"something just doesn't feel right about this.\" She does mention being afraid to fall asleep because, \"I just feel like someone is waiting for me to go to sleep so they can hurt me.\" Pt was reassured repeatedly about her safety, oriented to the unit including her SIO staff, and ample time for questions was offered.\"    \" Pt admitted from [facility] on [facility] with her left foot in a boot shoe from a fractured ankle that she said happened when she ran from the police today . Pt  confused , disconnected. Paranoid. She is unable to answer questions and not cooperative , labile and angry . She has refused all meds offered tonight but she did sleep after supper . Ice to her ankle , given Tylenol , Denies si or hallucinations.\"    \"Tony appeared to sleep a total of 4.75 hours this night shift.  She awoke during the night with pain in her left foot and ankle.  She was given Tylenol 650 mg at 0200 but declined another cold pack for comfort and any swelling. " " She is elevating her left foot and appears comfortable and sleeping at this time.  Remains on SIO staffing.\"      Patient Interview:   Met with patient in the hallway, she declined having a conversation in her bedroom. Patient expressed how she went to [facility] because \"her sister went outside the house and she was concerned about her safety\" then she ran after her ant that's how she broke injured her Left ankle. Patient appears very guarded and somewhat anxious    She was later interviewed again in her room.  She noted that she didn't understand why she was here.  She said her sister was acting scared so she \"tried to protect her like any normal person would... that's not crazy\" She said in the past when she has been disorganized, she checked herself in and she needed to be there but right now feels she is fine and doesn't need to be here.  She was frustrated that it was unlikely she could leave by Friday and said \"I take care of my family and they need me to be there\"       The risks, benefits, alternatives and side effects of any medication changes have been discussed and are understood by the patient and other caregivers.    Review of systems:     ROS was negative unless noted above.          Allergies:   No Known Allergies         Psychiatric Examination:   /83 (BP Location: Left arm)   Pulse 75   Temp 98.9  F (37.2  C) (Oral)   Resp 16   SpO2 100%   Weight is 0 lbs 0 oz  There is no height or weight on file to calculate BMI.    MENTAL STATUS EXAM    Appearance:  no apparent distress, normal posture and good hygiene  Attitude:  engaged, guarded and disorganized behavior  Psychomotor:  normal and no evidence of tics, dystonia, or tardive dyskinesia  Eye Contact: appropriate, looks around the room at times  Speech:  normal tone and normal rate  Mood: \"fine\"  Affect:  incongruent, blunted and restricted  Thought Content: denies suicidal ideation, denies homicidal ideation and preoccupations " regarding sister's safety  Thought Process: goal directed, ruminative and circumstantial  Sensorium: awake and alert  Cognition: memory grossly intact  Impulse control: poor  Insight: poor  Judgment: Poor         Labs:   No results found for this or any previous visit (from the past 24 hour(s)).     Assessment  & Plan      Diagnostic Impression:  Tony Gautam is a 30 year old female with a past psychiatric history of psychosis admitted from outside ER on 09/08/2021 due to concern for psychosis in the context of no active medications. Significant symptoms include psychosis and disorganization. Her last psychiatric hospitalization was in 06/2020.  She does not currently have an outpatient provider, was prescribed an selective serotonin reuptake inhibitor by [facility] on 9/6, but had not filled the prescription yet. Notes her sister as a primary social support. Patient denies any current substance use of any kind. Cause of current decompensation is unclear, likely that psychosocial stressors may have been contributory, but patient was unable to relay this information at time of admission.      Her reported symptoms of paranoia, disorganization, and likely AH, periods of depressed mood in the past that are more indicative of negative symptoms of schizophrenia vs schizoaffective disorder vs substance induced psychosis (per chart review and history of substance use, including K2/spice less likely due to negative urine toxycology). Diagnosis is still in evolution, given patient is poor historian. Optimization of medications to target these symptom clusters in addition to evaluation of adequate outpatient supports will be targets for treatment during this admission.      Given that she currently has psychosis, patient warrants inpatient psychiatric hospitalization to maintain her safety. Disposition pending clinical stabilization, medication optimization and development of an appropriate discharge plan.     Risk  for harm is high.  Risk factors: maladaptive coping, past behaviors, previous psychiatric hospitalization/ SI, access to fire arms in the household  and psychosis  Protective factors: family     She was medically cleared for admission to inpatient psychiatric unit. The risks, benefits, alternatives, and side effects of treatments including no treatment have been discussed and are understood by the patient and other caregivers.         Psychiatric Hospital course:   Bertha presented to the [facility] with psychosis including symptoms of paranoia and disorganization. Mental status exam was significant for possibly responding to internal stimuli, disorientation, blunted affect, speech latency, illogical and tangential speech, and disorganization. Given her disorganized behavior/psychotic symptoms, physical injury including fall   Prior to ED consulrt, patient was placed on an emergency hospitalization 72 hour hold on 8/9/2021 at 4:55 am due to concerns of safety. Olanzapine 10 mg ODT was given at 1128 am   Tony Gautam was admitted to [facility] from [facility] on a 72 hour hold. Patient had no active medications prior to admission, on 9/8  Olanzapine 2.5 mg at bedtime was scheduled, patient declined. On 9/9 court commitment process with [employer] was started. Orthopedic consult placed.          Principal Diagnosis:   # Primary Psychiatric Diagnosis:   # Schizophrenia vs Schizoaffective disorder  -Cont olanzapine to 2.5 mg PO at bedtime  -Start Risperidone 1 mg ODT at bedtime     -Hydroxyzine 25 mg PO/IM Q4hrs PRN      Secondary psychiatric & Medical diagnoses of concern this admission:   # 1. Comminuted and impacted calcaneal fracture.   -Orthopedic consult placed.  -Tylenol 650 mg PO Q4 hrs PRN for mild-moderate pain  - Ibuprofen 600 mg Q6hrs PRN for moderate pain        PRN  Medications  - Hydroxyzine 25 mg Q4hrs PRN   - Olanzapine 10 mg PO/IM TID PRN severe agitation/psychosis  - Trazodone 50 mg PRN at  bedtime or Melatonin 3mg po at bedtime PRN    - Acetaminophen 650 mg po Q4hrs PRN pain     Legal Status:   Orders Placed This Encounter      Legal status 72 Hour Hold      Safety Assessment:   Behavioral Orders   Procedures     Code 1 - Restrict to Unit     Elopement precautions     Routine Programming     As clinically indicated     Self Injury Precaution     Status 15     Every 15 minutes.     Status Individual Observation     Severe agitation    Patient SIO status reviewed with team/RN.  Please also refer to RN/team documentation for add'l detail.    -SIO staff to monitor following which have contributed to patient being on SIO: Patient mobility issue 2/2 ankle injury     -Possible interventions SIO staff could use to support patient's treatment progress: Assist with mobility and fall prevention    -When following observed, team will review discontinuation of SIO: patient able to ambulate/mobilize independently without concern for fall.     Order Specific Question:   CONTINUOUS 24 hours / day     Answer:   Other     Order Specific Question:   Specify distance     Answer:   10 ft     Order Specific Question:   Indications for SIO     Answer:   Self-injury risk     Order Specific Question:   Indications for SIO     Answer:   Assault risk       Disposition: Pending stabilization & development of a safe discharge plan. Patient arrived on a 72-hour hold placed on 9/8/2021 at 4:55 am, treatment team will file for court commitment due to patient extreme disorganization/psychotic state representing danger for self and/or others.    Patient will be treated in therapeutic milieu with appropriate individual and group therapies as described.  The patient was seen and the plan was discussed with the attending physician, Dr. Chip Phillips M.D.       Joslyn Storey, MS3    Attestation:   I was present with the medical student who participated in the service and in the documentation of the note. I have verified the history  and personally performed the physical exam and medical decision making. I agree with the assessment and plan of care as documented in the note.     Mayco Frazier MD  Psychiatry PGY-1 Resident   Pager:  [phone]    Psychiatry Attending Attestation:   This patient has been seen and evaluated by me, Chip Phillips.  I have discussed this patient with the psychiatry resident and I agree with the findings and plan in this note.    I have reviewed today's vital signs, medications, labs and imaging.     Chip Macedo MD on 9/9/2021 at 10:50 PM

## 2021-09-09 NOTE — PLAN OF CARE
Problem: Behavioral Health Plan of Care  Goal: Develops/Participates in Therapeutic [place] to Support Successful Transition  Outcome: Improving     Problem: Mood Impairment (Psychotic Signs/Symptoms)  Goal: Improved Mood Symptoms (Psychotic Signs/Symptoms)  Outcome: No change  Patient in room resting when shift commenced. Patient did change rooms, also order obtained for a medical bed as there is not enough room between the two beds to maneuver her wheelchair. (L) leg elevated when in bed, denies pain but endorses pain at a about 3/10 with movement.  Patient uses wheelchair for mobility.  Patient is NWB on that foot. Denies mental health symptoms including SI/SIB/AVH/Depression/Anxiety.  Affect flat/blunted. Patient appears guarded/suspicious/paranoid and has delayed response. States she does not know why she is here and that she only came for medical attention for her ankle and that she remembers being in her kitchen but did not have a knife. Patient is unwilling to give urine specimen for Utox and HCG and said she already gave urine on admission and will think about it. Attended group, ate dinner. Patient's sister brought ID card and Insurance card for patient. Declined HS medication stating that they are not ordered for her and will discuss with the team in the morning.     Patient has an order for CT of (L) foot w/contrast, ok to have it done tomorrow per AM nurse report.

## 2021-09-09 NOTE — PLAN OF CARE
Problem: OT General Care Plan  Goal: OT Goal 1  Description: Will attend OT groups and participate actively in all OT opportunities. Will assess and set goals.    Pt briefly joined OT clinic group (no charge) and was oriented to group materials, though politely requested to return to her room. Pt has not formally attended scheduled occupational therapy sessions. Encourage attendance and participation. Pt will be given self-assessment form, and OT staff will explain the purpose of including them in their treatment plan and offer options for meeting their needs.

## 2021-09-09 NOTE — PROGRESS NOTES
This writer was patient's assigned SIO staff from [id number].    At approximately 0830 This writer observed patient call 911 at one of the hallway phones. This writer directed pt to hang up phone, to which she refused. Phone was subsequently turned off. Staff explained to pt why it was not appropriate for pt to be calling emergency services. Pt stated she called 911 because she is worried about her sister and she is particularly concerned because she has not been in contact with her sister since she was admitted onto  22.       Staff explained to pt that if she would like to make a phone call that staff will have to dial the phone for her. Pt has asked several times to use the phone but refused to allow staff to dial the phone.

## 2021-09-09 NOTE — PLAN OF CARE
Problem: Behavioral Disturbance  Goal: Behavioral Disturbance  Description: Signs and symptoms of listed problems will be absent or manageable by discharge or transition of care.  Outcome: Improving   Pt admitted from [facility] with her left foot in a boot shoe from a fractured ankle that she said happened when she ran from the police today . Pt  confused , disconnected. Paranoid. She is unable to answer questions and not cooperative , labile and angry . She has refused all meds offered tonight but she did sleep after supper . Ice to her ankle , given Tylenol , Denies si or hallucinatioms

## 2021-09-10 ENCOUNTER — APPOINTMENT (OUTPATIENT)
Dept: CT IMAGING | Facility: CLINIC | Age: 31
DRG: 885 | End: 2021-09-10
Attending: PSYCHIATRY & NEUROLOGY
Payer: COMMERCIAL

## 2021-09-10 PROCEDURE — 250N000013 HC RX MED GY IP 250 OP 250 PS 637

## 2021-09-10 PROCEDURE — 73700 CT LOWER EXTREMITY W/O DYE: CPT | Mod: LT

## 2021-09-10 PROCEDURE — 99233 SBSQ HOSP IP/OBS HIGH 50: CPT | Mod: GC | Performed by: PSYCHIATRY & NEUROLOGY

## 2021-09-10 PROCEDURE — 73700 CT LOWER EXTREMITY W/O DYE: CPT | Mod: 26 | Performed by: RADIOLOGY

## 2021-09-10 PROCEDURE — 124N000002 HC R&B MH UMMC

## 2021-09-10 RX ADMIN — RISPERIDONE 1 MG: 1 TABLET, ORALLY DISINTEGRATING ORAL at 19:32

## 2021-09-10 ASSESSMENT — ACTIVITIES OF DAILY LIVING (ADL)
ORAL_HYGIENE: INDEPENDENT
DRESS: INDEPENDENT
HYGIENE/GROOMING: WITH ASSISTANCE

## 2021-09-10 NOTE — PROGRESS NOTES
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 reviewed today's vital signs, medications, labs and imaging.     Chip Macedo MD on 9/12/2021 at 12:44 PM

## 2021-09-10 NOTE — PLAN OF CARE
Problem: Mood Impairment (Psychotic Signs/Symptoms)  Goal: Improved Mood Symptoms (Psychotic Signs/Symptoms)  Outcome: No Change     Problem: Sleep Disturbance  Goal: Adequate Sleep/Rest  Outcome: No Change   Up to BR for voiding early AM though declining to provide a urine specimen as requested.  Felt that she had already provided this though reassured that she had not.  Will continue to try to obtain.  Further refused AM lab draw stating that she wanted to talk to her doctor about the labs before allowing the draw.  Realistic explanation given re: the need for the lab draw though continued to decline.   Appeared suspicious and paranoid re same.  Supportive intervention provided.

## 2021-09-10 NOTE — PLAN OF CARE
Assessment/Intervention/Current Symptoms and Care Coordination  - chart review  - [facility]  Suzette visited unit to meet with patient for screening. patient was initially reluctant to meet with screener - this writer met with her individually to discuss at which time she said she first wanted to see Dr. Phillips hoping that he would say she can discharge today. This writer explained that the provider is reccomedning she remain in the hospital for treatment which is why the screener was here to talk to her. Also discussed that it is her right to not meet with them however it is also an opprotunity to discuss and verbalize her thoughts on being discharged.   - team meeting  - team rounds/pt interview addressed patient needs/concerns (See psychiatry provider note for further details as well)  At time of team interview with patient this writer had also heard back from [facility] that they are supporting.   Team spoke with patient about concerns that she is experiencing mental health symptoms of psychosis which is why it is recommended that she continue to remain in the hospital for medication management and treatment. She spoke about not understanding why she is being held because she is not suicidal and wasn't trying to hurt her sister or herself. She said she didn't think her sister would say that she did and would say that she only wants to keep them safe. This writer did acknowledge that it appears from notes from the ED she was at that her sister did say she didn't think Tony was trying to hurt anyone or herself.   She again expressed that she did not want to take medications and preferred to first try therapy and that prefers to do all on her own out patient basis.  We also discussed the mental health court / commitment process further. Informing her that when orders are recived on the unit she wll be given a copy.   - post team rounds team meeting / discussion    - met with patient  individually in the afternoon/ early evening at length. We first discussed signing authorization to communicate with her sister. She said she spoke with her sister and did want her to be able to have updates and talk with the team. This writer did bring the form for authorization for verbal discussion of care which she signed and is now in the paper chart.  We also spoke further about the civil commitment process - she asked if she would really be in the hospital for a few weeks or if there is some chance it could be less. Discussed with her that our goal in the hospital is to treat symptoms , start medications and assess to ensure that the medication is helping and not causing any side effects as we can monitor more closely here. Discussed with her that starting to take medication will help to shorten duration of her hospital stay and often helps for being able to come to agreements to shorten duration of civil court process. Also discussed with her that minimally we will recommend that she is scheduled with a psychiatrist and therapist for when she leaves the hospital.    Informed her that when she is assigned an  for the court process the  will be able to best explain options and the process ongoing.   In regards to her concern about missing work and school this writer reinforced what provider had stated in our earlier team interview that we can provide letters. When this writer asked her what she is studying/school and what her work is - she asked why people ask her this. Informed her that in part it is because as part of our ongoing assessments in the hospital we ask people about their education and occupation history / current status, and also that if she didn't want to tell this writer what she is going to school for or what her job is right now that it is okay. She did not tell this writer what she is going to school for currently or her job.   Court documents were received on the unit not  long after our individual discussion and this writer brought her the copies, pointed out the hearing date for next week and her assigned 's contact information.     Discharge Plan or Goal  Pending stabilization & development of a safe discharge plan.    Barriers to Discharge   Patient requires further psychiatric stabilization due to current symptomology    Referral Status  No current referrals      Legal Status  Patient is on a court hold in [facility]     Court Orders recived on unit - copy of orders provided to unit by this writer     Wednesday September 15 2021 10:30 - Preliminary / Probable Cause Hearing      Per internal process this writer sent message to behavioral administration staff regarding need to schedule ITV hearing    Copy of legal orders placed for H.I.M Scan into media of chart    Copy of orders placed in paper chart

## 2021-09-10 NOTE — PROGRESS NOTES
09/09/21 2100   Groups   Details Psychotherapy   Number of patients attending the group:  6  Group Length:  1 Hour- there for half of group    Group Therapy Type: Psychotherapy    Summary of Group / Topics Discussed:      The  Psychotherapy group goal is to promote insight to positive choice and change. Group processing is within a supportive and safe environment. Patients will process emotions using verbal group and expressive psychotherapy interventions including visual art/writing interventions.    Group interventions support patients by: communication/social skills and supports, self efficacy/empowerment and mental health management    Modalities to reach these goals include: DBT (Dialectical Behavioral Therapy) / Process discussion    Subjective -patient report of mood today-  Fine, good    Objective/ Intervention- Goal of group and Therapeutic modality utilized- DBT skills PLEASED AND GIVE skills    Group Response-engaged larger group    Patient Response-Pt was pleasant, cooperative and engaged for about half the group when she got called out by her nurse and did not return to group. When she introduced herself she said she likes to paint, sing and dance.    Max Power, ROGERIOFT, ATR-BC

## 2021-09-10 NOTE — PLAN OF CARE
"  Problem: Behavioral Disturbance  Goal: Behavioral Disturbance  Description: Signs and symptoms of listed problems will be absent or manageable by discharge or transition of care.  Outcome: No Change   \"They told me I was on a 72 hour hold and that was it.\" Affect is flat. Patient has asked multiple times when can she talk to the doctor. Has been reassured she will see Dr. Phillips today. Patient is wondering when she can leave. Denies anxiety and depression, \"I just want to get out of here.\" States thoughts are clear and focusing is good. Denies suicidal thoughts. Had a CT scan of left ankle this morning. Met with the pre petition screener. Attended community meeting. Has been sitting in wheelchair. Is non weight bearing left leg. Has been pleasant.   "

## 2021-09-10 NOTE — PLAN OF CARE
Patient remains on SIO as ordered with 1:1 staffing for safety. Patient slept most of the night, although had difficulty falling asleep at the beginning of the shift. Comfort measures were used to help patient sleep, for example a quiet milieu. Patient elevated left leg on pillows and appeared comfortable. Noted patient was not laying on medical bed, and informed patient that she can lay on the medical bed for comfort, which is ordered. However, patient preferred not to use the medical bed at this time. Patient said just wanted to elevate leg and felt that the current bed was okay. Patient was calm and pleasant. Patient has wheel chair at bedside to aide with mobility. Patient did not complain of pain. Patient's assigned RN, did check in with patient several times. Will continue to monitor and update if there are changes.

## 2021-09-11 LAB
ALBUMIN SERPL-MCNC: 3.4 G/DL (ref 3.4–5)
ALP SERPL-CCNC: 40 U/L (ref 40–150)
ALT SERPL W P-5'-P-CCNC: 17 U/L (ref 0–50)
ANION GAP SERPL CALCULATED.3IONS-SCNC: 2 MMOL/L (ref 3–14)
AST SERPL W P-5'-P-CCNC: 17 U/L (ref 0–45)
BASOPHILS # BLD AUTO: 0 10E3/UL (ref 0–0.2)
BASOPHILS NFR BLD AUTO: 1 %
BILIRUB SERPL-MCNC: 0.9 MG/DL (ref 0.2–1.3)
BUN SERPL-MCNC: 12 MG/DL (ref 7–30)
CALCIUM SERPL-MCNC: 8.6 MG/DL (ref 8.5–10.1)
CHLORIDE BLD-SCNC: 107 MMOL/L (ref 94–109)
CO2 SERPL-SCNC: 30 MMOL/L (ref 20–32)
CREAT SERPL-MCNC: 0.9 MG/DL (ref 0.52–1.04)
EOSINOPHIL # BLD AUTO: 0.1 10E3/UL (ref 0–0.7)
EOSINOPHIL NFR BLD AUTO: 1 %
ERYTHROCYTE [DISTWIDTH] IN BLOOD BY AUTOMATED COUNT: 11.9 % (ref 10–15)
FOLATE SERPL-MCNC: 11.7 NG/ML
GFR SERPL CREATININE-BSD FRML MDRD: 87 ML/MIN/1.73M2
GLUCOSE BLD-MCNC: 96 MG/DL (ref 70–99)
HCT VFR BLD AUTO: 35.7 % (ref 35–47)
HGB BLD-MCNC: 12.3 G/DL (ref 11.7–15.7)
IMM GRANULOCYTES # BLD: 0 10E3/UL
IMM GRANULOCYTES NFR BLD: 0 %
LYMPHOCYTES # BLD AUTO: 1.6 10E3/UL (ref 0.8–5.3)
LYMPHOCYTES NFR BLD AUTO: 28 %
MCH RBC QN AUTO: 28.7 PG (ref 26.5–33)
MCHC RBC AUTO-ENTMCNC: 34.5 G/DL (ref 31.5–36.5)
MCV RBC AUTO: 83 FL (ref 78–100)
MONOCYTES # BLD AUTO: 0.6 10E3/UL (ref 0–1.3)
MONOCYTES NFR BLD AUTO: 10 %
NEUTROPHILS # BLD AUTO: 3.5 10E3/UL (ref 1.6–8.3)
NEUTROPHILS NFR BLD AUTO: 60 %
NRBC # BLD AUTO: 0 10E3/UL
NRBC BLD AUTO-RTO: 0 /100
PLATELET # BLD AUTO: 227 10E3/UL (ref 150–450)
POTASSIUM BLD-SCNC: 3.8 MMOL/L (ref 3.4–5.3)
PROT SERPL-MCNC: 6.8 G/DL (ref 6.8–8.8)
RBC # BLD AUTO: 4.28 10E6/UL (ref 3.8–5.2)
SODIUM SERPL-SCNC: 139 MMOL/L (ref 133–144)
TSH SERPL DL<=0.005 MIU/L-ACNC: 1.18 MU/L (ref 0.4–4)
VIT B12 SERPL-MCNC: 453 PG/ML (ref 193–986)
WBC # BLD AUTO: 5.8 10E3/UL (ref 4–11)

## 2021-09-11 PROCEDURE — 85025 COMPLETE CBC W/AUTO DIFF WBC: CPT | Performed by: PSYCHIATRY & NEUROLOGY

## 2021-09-11 PROCEDURE — 124N000002 HC R&B MH UMMC

## 2021-09-11 PROCEDURE — 250N000013 HC RX MED GY IP 250 OP 250 PS 637

## 2021-09-11 PROCEDURE — 82607 VITAMIN B-12: CPT | Performed by: PSYCHIATRY & NEUROLOGY

## 2021-09-11 PROCEDURE — 82040 ASSAY OF SERUM ALBUMIN: CPT | Performed by: PSYCHIATRY & NEUROLOGY

## 2021-09-11 PROCEDURE — 36415 COLL VENOUS BLD VENIPUNCTURE: CPT | Performed by: PSYCHIATRY & NEUROLOGY

## 2021-09-11 PROCEDURE — 84443 ASSAY THYROID STIM HORMONE: CPT | Performed by: PSYCHIATRY & NEUROLOGY

## 2021-09-11 PROCEDURE — 82746 ASSAY OF FOLIC ACID SERUM: CPT | Performed by: PSYCHIATRY & NEUROLOGY

## 2021-09-11 RX ADMIN — RISPERIDONE 1 MG: 1 TABLET, ORALLY DISINTEGRATING ORAL at 19:57

## 2021-09-11 ASSESSMENT — ACTIVITIES OF DAILY LIVING (ADL)
HYGIENE/GROOMING: INDEPENDENT
DRESS: SCRUBS (BEHAVIORAL HEALTH);INDEPENDENT
ORAL_HYGIENE: INDEPENDENT

## 2021-09-11 NOTE — PLAN OF CARE
"  Problem: Mood Impairment (Psychotic Signs/Symptoms)  Goal: Improved Mood Symptoms (Psychotic Signs/Symptoms)  Outcome: No Change     Problem: Sleep Disturbance  Goal: Adequate Sleep/Rest  Outcome: Declining   Rec'd pt. wakeful and resting quietly in bed w/o complaints or noted distress;  Respirations regular and non-labored.  Chooses to sleep in non-medical bed though medical bed present in room.  Left leg/foot in proper alignment in Rooke Boot and elevated on 2 pillows. Wheelchair w/i easy reach  at  for mobility.  Pleasant in conversation.  States that she had her medication when prn was offered and did not want to take any more medication.  Denied any discomforts and help rejecting re:  Comfort measures appearing somewhat paranoid and guarded.  Reassured of staff availability to assist w/ any unmet needs.  Off to sleep at approx 0145 appearing comfortable.  Unable to remain asleep;  Up to dining area via w/c at approx 0345 for po fluids although she had earlier refused ice water when provided by staff.  Readily back to bed;  Transfers easily and independently from w/c to bed.  Noted sleeping again on 0430 rounds.  Environment conducive to pt's safety and well-being.  Observed to have slept for approx 3.5 hrs only on all Q 15\" rounds throughout the shift.  Safe, supportive therapeutic environment maintained.   "

## 2021-09-11 NOTE — PROGRESS NOTES
Brief Orthopaedic Surgery Note    Tony Gautam is a 30 year old female with PMH significant for psychosis currently admitted to the mental health unit for psychosis, with a fall down stairs 9/8 with closed left calcaneus fracture. Patient was previously evaluated by orthopedics on the date of admission for her left calcaneus fracture.  We recommended nonweightbearing to the left lower extremity, bulky Montoya splint application, CT scan of the calcaneus as well as outpatient follow-up but the patient repeatedly denied any interventions.  Orthopedic surgery was consulted today as the CT scan was completed and the patient is now agreeable to splinting.    Patient was seen in the mental health unit today.  She is currently wearing a Rooke boot to the left lower extremity.  She reports ongoing pain and swelling over the heel.  The left foot was examined and demonstrates ecchymosis and swelling over the heel without evidence of skin breakdown. SILT superficial peroneal, deep peroneal, saphenous, sural, and tibial nerve distributions.  Able to fire EHL, FHL, tib ant, gastroc.  Bulky Montoya splint was placed today, please see procedure note below for details.  CT scan was also reviewed which demonstrates a significantly comminuted calcaneus fracture with intra-articular involvement.    The patient's insight seems to have improved and is now agreeable to possible surgery if recommended by the orthopedics team.  Per discussion with her psych she is currently on a court hold and may be inpatient for possibly several weeks.  Our team will discuss plans for possible calcaneus ORIF with Dr. Gibson early this week to determine if any surgical intervention might be indicated while she is inpatient.    ==    Ayesha Asher MD  PGY-4  Orthopaedic Surgery    Procedure: Short leg Splint Application    Indication: Left calcaneus fracture    Procedure: After discussion of the risks, benefits and alternatives, consent was obtained from the  patient for the aforementioned procedure. Correct site and side verified with the patient. Neurovascular status checked pre procedure. CMS intact. Well padded bulky Montoya short leg splint applied to left leg. Neurovascular status checked post procedure. CMS intact. Patient tolerated the procedure well without any immediate complications.

## 2021-09-11 NOTE — PLAN OF CARE
"  Problem: Behavior Regulation Impairment (Psychotic Signs/Symptoms)  Goal: Improved Behavioral Control (Psychotic Signs/Symptoms)  Outcome: No Change   Denies depression and anxiety, \"I want to get out of here.\" States thoughts are clear, \"I'm upset about being here, it's forced. I understand why they think I need to be here.\" States is able to focus well. Denies suicidal thoughts. States attends groups but is not able to stay the whole time because ankle and foot hurt and needs to elevate leg in bed. Declines a shower, has been washing up in bathroom. Has been non weight bearing on left leg. Uses the wheelchair for mobility.   "

## 2021-09-11 NOTE — PLAN OF CARE
Problem: Behavior Regulation Impairment (Psychotic Signs/Symptoms)  Goal: Improved Behavioral Control (Psychotic Signs/Symptoms)  Outcome: Improving   Pt states she feels clear in her thinking , pleasant , calm .alert and orientated . Pt aware of Ct scan showing the fractured ankle and heel . She has refused Tylenol and ibuprofen and ice  but did agree to use a Rooke boot which she was given. Aware that Orthopedic doctor will see her tomorrow. Denies depression and anxiety or any psychosis ,  Med  compliant , denies si

## 2021-09-12 PROCEDURE — 250N000013 HC RX MED GY IP 250 OP 250 PS 637

## 2021-09-12 PROCEDURE — 124N000002 HC R&B MH UMMC

## 2021-09-12 RX ADMIN — RISPERIDONE 1 MG: 1 TABLET, ORALLY DISINTEGRATING ORAL at 19:35

## 2021-09-12 NOTE — PLAN OF CARE
"  Problem: Sleep Disturbance  Goal: Adequate Sleep/Rest  Outcome: No Change     Problem: Fracture Stabilization and Management (Orthopaedic Fracture)  Goal: Fracture Stability  Outcome: No Change     Problem: Functional Ability Impaired (Orthopaedic Fracture)  Goal: Optimal Functional Ability  Outcome: No Change     Problem: Pain (Orthopaedic Fracture)  Goal: Acceptable Pain Control  Outcome: No Change   Rec'd pt. Sleeping w/regular non-labored respirations and no observed distress as shift commenced.  Left lower extremity/L foot w/drsg (Ace Wrap)/Splint  in tact in proper alignment and kept elevated on several pillows as pt. Prefers it.  Has declined use of medical bed.  Toes w/ good color and w/o obvious s/s's of impaired circulation.  Observed to have slept for approx 7 hrs on all Q 15\" rounds throughout the shift w/ no reported or observed discomfort.  PO fluids kept at BS.  W/c kept within easy BS reach for mobility. Monitored closely for safety.  Safe, therapeutic environment maintained.   Wakeful, up to dining area via w/c for po fluids just before 0700.  Independent transfers w/o difficulty or any wt bearing on L ft.   Somewhat guarded though appropriately  responsive.  Declined offer for assist  w/ comfort measures upon returning to bed stating that she was \"fine\" .  Reassured of staff availability to assist w/ any unmet needs. Pleasantly receptive.  Appears resting comfortably as shift concludes.    "

## 2021-09-12 NOTE — PROGRESS NOTES
Brief Note - Cross Cover    S: Alerted by staff that patient's dressing placed by ortho yesterday includes long Ace wraps that are possible safety concern. Per consult note this is a Bulky Montoya splint placed for left calcaneus fracture. Discussed with on-call ortho resident who this is the only appropriate splinting at this time; more rigid splinting or casting with this injury would not allow for swelling/expansion and risks damaging surrounding skin and soft tissue.     Patient has been cooperative on unit thus far. Has patient care orders for medical bed and wheelchair use. No roommate. Admitted for psychosis.      O: /70 (BP Location: Right arm)   Pulse 101   Temp 98.2  F (36.8  C) (Oral)   Resp 18   Wt 59.6 kg (131 lb 4.8 oz)   SpO2 99%   BMI 21.36 kg/m      A/P:  Psychotic patient with calcaneous fracture wrapped in soft splint with Ace wraps.   - continue status 15 with 15-minute checks  - discontinue self injury precautions  - patient care order placed; okay to have soft splint  - no indication for 1:1 at this time, low threshold to initiate if any changes     Angie Rooney MD  PGY2 Psychiatry

## 2021-09-12 NOTE — PLAN OF CARE
"  Problem: Behavioral Disturbance  Goal: Behavioral Disturbance  Description: Signs and symptoms of listed problems will be absent or manageable by discharge or transition of care.  Outcome: No Change   \"I'm pretty good. I feel better but I still want to get out of here. I've accepted I have to be here for a while. I was able to get a hold of people so I could talk.\" Denies depression and anxiety. States thoughts are clear and is able to focus well.  Will intermittently sit in lounge and then in bed with left leg elevated. Is visiting with a friend this afternoon. Declined to get urine specimen today.  "

## 2021-09-12 NOTE — PLAN OF CARE
Problem: Behavior Regulation Impairment (Psychotic Signs/Symptoms)  Goal: Improved Behavioral Control (Psychotic Signs/Symptoms)  Outcome: Improving   Pt states her thinking is clear and she is not feeling suicidal or hallucinations . She spent most of the shift in the lounge , calm . Pleasant, refuses any pain meds, Pt is hoping to have surgery on her ankle early next week . 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

## 2021-09-13 PROCEDURE — G0177 OPPS/PHP; TRAIN & EDUC SERV: HCPCS

## 2021-09-13 PROCEDURE — 124N000002 HC R&B MH UMMC

## 2021-09-13 PROCEDURE — 99232 SBSQ HOSP IP/OBS MODERATE 35: CPT | Mod: GC | Performed by: PSYCHIATRY & NEUROLOGY

## 2021-09-13 PROCEDURE — 250N000013 HC RX MED GY IP 250 OP 250 PS 637

## 2021-09-13 RX ORDER — RISPERIDONE 2 MG/1
2 TABLET, ORALLY DISINTEGRATING ORAL AT BEDTIME
Status: DISCONTINUED | OUTPATIENT
Start: 2021-09-13 | End: 2021-09-21 | Stop reason: HOSPADM

## 2021-09-13 RX ADMIN — RISPERIDONE 2 MG: 2 TABLET, ORALLY DISINTEGRATING ORAL at 19:54

## 2021-09-13 ASSESSMENT — ACTIVITIES OF DAILY LIVING (ADL)
ORAL_HYGIENE: INDEPENDENT
HYGIENE/GROOMING: INDEPENDENT
DRESS: SCRUBS (BEHAVIORAL HEALTH);INDEPENDENT

## 2021-09-13 NOTE — PROGRESS NOTES
"  ----------------------------------------------------------------------------------------------------------  [facility], [place]   Psychiatric Progress Note  Hospital Day #5     Interim History:   The patient's care was discussed with the treatment team and chart notes were reviewed.    Sleep 6 hours (09/13/21 0600)  Scheduled Medications: Compliant with scheduled medications   PRN medications: No PRN medications given or requested.    Staff Report:   Weekend report:  \" Pt states her thinking is clear and she is not feeling suicidal or hallucinations . She spent most of the shift in the lounge , calm . Pleasant, refuses any pain meds, Pt is hoping to have surgery on her ankle early next week . 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 She is very , calm ,pleasant , comes to med window to request her hs med of Risperidone 1 mg which she says is helping her feel calm and relaxed but feels it is not too much . She is hoping to have her ankle repaired soon and come back to psych floor when stable after surgery  Pt called nurse to her room later AT 10 PM , she was wondering about the court hold and when she could leave . Pt told there were concerns about the guns and her confusion on admission . She was trying to piece together that nights events which was a very convoluted story , She says she receive an odd text message from a former boyfriend that said , \"Have you ever used a strap?\". She says she does not know what that means or what a strap is , and was thinking it was a gun . She states she lives with her father and her twin sister and her father has guns but one was missing . She says she heard a noise in the attic and thought someone was in the house , went to look for her sister outside and missed a step on the patio and broke her ankle. Pt reassured that the doctors would be in " "tomorrow to see her and explain the treatment plan , She is calm , feels safe.\"      Patient Interview:   Treatment team met with patient in her bedroom, preferred to have conversation in bedroom versus in the hallway or common area. Patient is concerned about her length of stay and when she will be discharged. She comments that she is not getting any better hospitalized and justifies behavior prior to admission. She reports that this hospitalization is very inconvenient for her as she has a lot of things to do at home, including taking care of her dog and keeping up with schoolwork.  She reports \"Anyone would crazy in here,\" when discussing the importance of hospitalization and making sure she is safe before going home. Patient relates she is taking medications as prescribed and going to groups as she is able. There is nothing more she can do to \"help her get out of here.\" Denies any adverse symptoms from medications. Patient does comment that she cannot participate in several of the group activities because of her ankle injury and she is limited to her wheelchair.     Patient is also confused about her ankle injury and if she needs surgery. She is unsure why it was originally thought she did not need surgery, then the decision was changed. Additionally, patient is concerned with undergoing ankle surgery while she is hospitalized for other reasons. She would like to delay the surgery until she is discharged from current hospitalization. Patient comments she does not want the surgery to keep her in the hospital longer than necessary. Denies pain or need for any medications.     Discussed increase in Risperidone from 1 mg to 2 mg at bedtime. Patient is agreeable. The risks, benefits, alternatives and side effects of any medication changes have been discussed and are understood by the patient and other caregivers.     Discussed completing MRI for first psychotic episode work up. Patient is agreeable.     Review of " "systems:     ROS was negative unless noted above.          Allergies:   No Known Allergies         Psychiatric Examination:   /75   Pulse 79   Temp 97.9  F (36.6  C) (Oral)   Resp 18   Wt 59.6 kg (131 lb 4.8 oz)   SpO2 100%   BMI 21.36 kg/m    Weight is 131 lbs 4.8 oz  Body mass index is 21.36 kg/m .    MENTAL STATUS EXAM    Appearance:  no apparent distress, normal posture, appears stated age and appropriately dressed. Visibly upset at times.  Attitude:  cooperative and engaged, guarded at times  Psychomotor:  normal and no evidence of tics, dystonia, or tardive dyskinesia  Eye Contact: appropriate  Speech:  normal tone and normal rate  Mood: \"ok\"  Affect:  incongruent and restricted  Thought Content: denies suicidal ideation, denies homicidal ideation and preoccupations regarding her sister safety and beng discharged from the hospital.  Thought Process: goal directed and ruminative  Sensorium: awake and alert  Cognition: memory grossly intact  Impulse control: fair  Insight: poor  Judgment: poor         Labs:   No results found for this or any previous visit (from the past 24 hour(s)).     Assessment  & Plan      Assessment:   Diagnostic Impression:  Tony Gautam is a 30 year old female with a past psychiatric history of psychosis admitted from outside ER on 09/08/2021 due to concern for psychosis in the context of no active medications. Significant symptoms include psychosis and disorganization. Her last psychiatric hospitalization was in 06/2020.  She does not currently have an outpatient provider, was prescribed an selective serotonin reuptake inhibitor by [facility] ED on 9/6, but had not filled the prescription yet. Notes her sister as a primary social support. Patient denies any current substance use of any kind. Cause of current decompensation is unclear, likely that psychosocial stressors may have been contributory, but patient was unable to relay this information at time of admission. "      Her reported symptoms of paranoia, disorganization, and likely AH, periods of depressed mood in the past that are more indicative of negative symptoms of schizophrenia vs schizoaffective disorder vs substance induced psychosis (per chart review and history of substance use, including K2/spice less likely due to negative urine toxycology). By chart review, it appears that her episodes of psychosis may be more severe with each occurrence which may provide stronger rational for a schizophrenia diagnosis. Diagnosis is still in evolution, given patient is poor historian. Optimization of medications to target these symptom clusters in addition to evaluation of adequate outpatient supports will be targets for treatment during this admission.      Today Tony is on hospital day #5.  She is now compliant with psychiatric medication (Court commitment with [facility] supported on 9/10/2021) and also cares for her fracture.  She continues to deny all mental health symptoms and provides rational justifications for her actions or denies events that led to her hospitalization.       Given that she currently has psychosis, patient warrants inpatient psychiatric hospitalization to maintain her safety. Disposition pending clinical stabilization, medication optimization and development of an appropriate discharge plan.     Risk for harm is high.  Risk factors: maladaptive coping, past behaviors, previous psychiatric hospitalization/ SI, access to fire arms in the household  and psychosis  Protective factors: family     She was medically cleared for admission to inpatient psychiatric unit. The risks, benefits, alternatives, and side effects of treatments including no treatment have been discussed and are understood by the patient and other caregivers.        Psychiatric Hospital course:   Bertha presented to the [place] ED with psychosis including symptoms of paranoia and disorganization. Mental status exam was significant for  possibly responding to internal stimuli, disorientation, blunted affect, speech latency, illogical and tangential speech, and disorganization. Given her disorganized behavior/psychotic symptoms, physical injury including fall   Prior to ED consulrt, patient was placed on an emergency hospitalization 72 hour hold on 8/9/2021 at 4:55 am due to concerns of safety. Olanzapine 10 mg ODT was given at 1128 am   Tony Gautam was admitted to [facility] from [facility] on a 72 hour hold. Patient had no active medications prior to admission, on 9/8  Olanzapine 2.5 mg at bedtime was scheduled, patient declined. On 9/9 court commitment process with [facility] was started. Orthopedic consult placed. Risperidone 1 mg ODT tab was scheduled on 9/9, patient declined. 9/10  Court commitment with [facility] was supported. 9/13  Risperidone increased from 1 mg ODT at bedtime to 2 mg ODT at bedtime.           Principal Diagnosis:   # Primary Psychiatric Diagnosis:   # Schizophrenia vs Schizoaffective disorder  - Increase Risperidone 2 mg ODT at bedtime   -Cheeking precautions.    -Hydroxyzine 25 mg PO/IM Q4hrs PRN        Secondary psychiatric & Medical diagnoses of concern this admission:   # 1. Comminuted and impacted calcaneal fracture.   -Per Ortho/Surg: Soft splint placement 9/11  - Follow up with Orthopedic Surgery  -Tylenol 650 mg PO Q4 hrs PRN for mild-moderate pain  - Ibuprofen 600 mg Q6hrs PRN for moderate pain.       PRN  Medications  - Hydroxyzine 25 mg Q4hrs PRN   - Olanzapine 10 mg PO/IM TID PRN severe agitation/psychosis  - Trazodone 50 mg PRN at bedtime or Melatonin 3mg po at bedtime PRN    - Acetaminophen 650 mg po Q4hrs PRN pain      Legal Status:   Patient in court hold commitment with [facility].          Safety Assessment:   Behavioral Orders   Procedures    Cheeking Precautions (behavioral units)     Patient Observed swallowing PO medications; Patient asked to drink water after swallowing medication; Patient in Staff line  of sight for 15 minutes after medication given; Mouth checks after PO administration (patient asked to open mouth and stick out their tongue).    Code 1 - Restrict to Unit    Elopement precautions    Routine Programming     As clinically indicated    Status 15     Every 15 minutes.       Disposition: Pending stabilization & development of a safe discharge plan.       Attestation:   I was present with the medical student who participated in the service and in the documentation of the note. I have verified the history and personally performed the physical exam and medical decision making. I agree with the assessment and plan of care as documented in the note.    Hannah Ocasio, MS3  [facility]     Patient will be treated in therapeutic milieu with appropriate individual and group therapies as described.  The patient was seen and the plan was discussed with the attending physician, Dr. Lizeth Barahona M.D.     Mayco Frazier MD  Psychiatry PGY-1 Resident   Pager:  [phone]    Attestation:  This patient has been seen and evaluated by me, Lizeth Barahona MD.  I have discussed this patient with the psychiatry resident and I agree with the findings and plan in this note.    I have reviewed today's vital signs, medications, labs and imaging. Lizeth Barahona MD

## 2021-09-13 NOTE — PLAN OF CARE
"Pt attended 2 of 3 OT groups today. Pt participated in OT clinic IND, where she initiated a chosen project (painting a birdhouse), followed through with plan, and asked for support with supplies as needed. Pt was calm and pleasant throughout group. Provided pt with OT self assessment, however, pt declined to complete at this time stating, \"I have too much paperwork already.\" Additionally, pt participated in PM therapeutic group activity and discussion addressing goal setting. Educated pt on benefits of setting specific, achievable goals with pt verbalizing understanding. Pt identified what she's doing well, where she needs improvement, and identified a goal for self in six areas (family, friends, work, spirituality, body, and mental health). 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 Pt also reports that she has been told her \"though process is not always right\" and that she would like to learn how to address this. Pt will continue to benefit from OT intervention to address implementation of positive functional coping skills, role performance, and community reintegration.     "

## 2021-09-13 NOTE — PLAN OF CARE
"Pt continues to say her thinking is clear , denies hallucination or si . She is very , calm ,pleasant , comes to med window to request her hs med of Risperidone 1 mg which she says is helping her feel calm and relaxed but feels it is not too much . She is hoping to have her ankle repaired soon and come back to psych floor when stable after surgery    Pt called nurse to her room later AT 10 PM , she was wondering about the court hold and when she could leave . Pt told there were concerns about the guns and her confusion on admission . She was trying to piece together that nights events which was a very convoluted story , She says she receive an odd text message from a former boyfriend that said , \"Have you ever used a strap?\". She says she does not know what that means or what a strap is , and was thinking it was a gun . She states she lives with her father and her twin sister and her father has guns but one was missing . She says she heard a noise in the attic and thought someone was in the house , went to look for her sister outside and missed a step on the patio and broke her ankle. Pt reassured that the doctors would be in tomorrow to see her and explain the treatment plan , She is calm , feels safe .  "

## 2021-09-13 NOTE — PLAN OF CARE
Assessment/Intervention/Current Symptoms and Care Coordination  Met with teams, chart review    Discharge Plan or Goal  Patient will discharge to undetermined level of care with appropriate referrals and supports.    Barriers to Discharge   Patient requires further evaluation and stabilization of mh symptoms.    Referral Status  No referrals made today    Legal Status  Court Hold- [place]- prelim hearing 9/15/21

## 2021-09-13 NOTE — PLAN OF CARE
"  Problem: Sleep Disturbance  Goal: Adequate Sleep/Rest  Outcome: No Change     Problem: Fracture Stabilization and Management (Orthopaedic Fracture)  Goal: Fracture Stability  Outcome: No Change     Problem: Functional Ability Impaired (Orthopaedic Fracture)  Goal: Optimal Functional Ability  Outcome: No Change     Problem: Pain (Orthopaedic Fracture)  Goal: Acceptable Pain Control  Outcome: No Change   Rec'd pt. sleeping shortly after shift change w/ regular non-labored respirations and no acute distress.  Left leg/foot in proper alignment and elevated on several pillows.  Wheelchair w/i easy reach at  for mobility.  Environment conducive to pt's safety and well-being.   Observed to have slept for approx 6 hrs  on all Q 15\" rounds overnight w/ no reported or observed distress.  Safe, supportive, therapeutic environment maintained.  "

## 2021-09-13 NOTE — PLAN OF CARE
Problem: Behavior Regulation Impairment (Psychotic Signs/Symptoms)  Goal: Improved Behavioral Control (Psychotic Signs/Symptoms)  Outcome: No Change   Denies depression and anxiety. Thoughts are clear and is able to focus well. Does not want to take a shower, has been sponge bathing in bathroom. Attends groups. Is not able to always stay for the whole group because of the ankle fracture, bulkiness and heaviness of the Montoya splint.  Is mobile in wheelchair. Bulky Montoya splint is on left lower leg.

## 2021-09-14 PROCEDURE — 99231 SBSQ HOSP IP/OBS SF/LOW 25: CPT | Mod: GC | Performed by: PSYCHIATRY & NEUROLOGY

## 2021-09-14 PROCEDURE — H2032 ACTIVITY THERAPY, PER 15 MIN: HCPCS

## 2021-09-14 PROCEDURE — 250N000013 HC RX MED GY IP 250 OP 250 PS 637

## 2021-09-14 PROCEDURE — 124N000002 HC R&B MH UMMC

## 2021-09-14 PROCEDURE — G0177 OPPS/PHP; TRAIN & EDUC SERV: HCPCS

## 2021-09-14 RX ADMIN — RISPERIDONE 2 MG: 2 TABLET, ORALLY DISINTEGRATING ORAL at 20:40

## 2021-09-14 ASSESSMENT — ACTIVITIES OF DAILY LIVING (ADL)
ORAL_HYGIENE: INDEPENDENT
ORAL_HYGIENE: INDEPENDENT
DRESS: INDEPENDENT
DRESS: INDEPENDENT
LAUNDRY: WITH SUPERVISION
LAUNDRY: WITH SUPERVISION
HYGIENE/GROOMING: INDEPENDENT
HYGIENE/GROOMING: INDEPENDENT

## 2021-09-14 NOTE — PLAN OF CARE
"  Problem: Sleep Disturbance  Goal: Adequate Sleep/Rest  Outcome: Improving     Problem: Fracture Stabilization and Management (Orthopaedic Fracture)  Goal: Fracture Stability  Outcome: No Change     Problem: Functional Ability Impaired (Orthopaedic Fracture)  Goal: Optimal Functional Ability  Outcome: No Change     Rec'd pt. Sleeping w/ regular non-labored respirations and no observed distress as shift commenced.  Lt. Foot in bulky Montoya Splint w/ Ace Wrap. Wheelchair w/I easy reach at BS.  Environment conducive to pt's safety and well being.  Observed to have slept for approx 7 hrs. On all Q 15 \" rounds throughout the night w/ no reported or observed distress.  Safe, therapeutic environment maintained.  "

## 2021-09-14 NOTE — PLAN OF CARE
Assessment/Intervention/Current Symptoms and Care Coordination  - chart review  - team meeting      Discharge Plan or Goal  Pending stabilization & development of a safe discharge plan.  Considerations include: assessment is ongoing      Barriers to Discharge   Patient requires further psychiatric stabilization due to current symptomology      Referral Status  No new referrals made today - will need mental health follow up       Legal Status  Patient is on a court hold in [facility]   Wednesday September 15, 2021 - 10:30am

## 2021-09-14 NOTE — PLAN OF CARE
Problem: Behavioral Health Plan of Care  Goal: Develops/Participates in Therapeutic [place] to Support Successful Transition  Outcome: Improving    Patient in her room resting when shift started, came out to the milieu for snacks soon after. Mood calm, affect flat/blunted but does brighten on approach. Bertha denies SI/SIB/AVH/Depression/Anxiety but states she would like to discharge soon, states she has a court date tomorrow and hoping it goes well or she may have to stay for three weeks. Patient endorses some discomfort to her L foot but declines pain meds. States orthopedic was supposed to come today or yesterday but she has not seen them, would like to know what plan of care is for her foot. Foot elevated for comfort, CMS intact. Patient attended group, ate dinner and is me compliant. No other concerns.

## 2021-09-14 NOTE — PLAN OF CARE
Number of patients attending the group:  1  Group Length:  0.5 Hour    Group Therapy     Summary of Group / Topics Discussed:    The psychotherapy group goal is to promote insight to positive choice and change. Group processing is within a supportive and safe environment. Patient processed emotions using verbal group and expressive psychotherapy interventions.    Pt was the only participant in this session. She requested to learn how to manage paranoid symptoms. She reported paranoia as a significant part of her stressor. Writer focused on two aspects - psycho-education, which emphasized strategies pt could use to identify her early warning signs. The second part focused on ways pt could take action once she senses symptoms of paranoia at early onset. Interventions discussed included self-orientation to name paranoia when it exists, which helps to ground pt toward seeking help. Finally, pt was educated on how to engage her medical team in discussing medication strategies focusing on compliance.     Assessment: Pt presented on a wheelchair. Presented with congruent affect. Asked questions of this writer related to ways and what she could do to get better. Reported she is in pain from her leg, but is not asking for pain medications. She reported her goal is to ultimately have a future despite her diagnosis.      Patient Response: Pt was thankful for the intervention. Reported willingness to practice the skills discussed.

## 2021-09-14 NOTE — PROGRESS NOTES
Post date note from 9/13/21: CMS to toes on left foot is good, denies numbness and tingling in toes, toes are pink and giovanna well

## 2021-09-14 NOTE — PROGRESS NOTES
"  ----------------------------------------------------------------------------------------------------------  [facility], [place]   Psychiatric Progress Note  Hospital Day #6     Interim History:   The patient's care was discussed with the treatment team and chart notes were reviewed.    Sleep 7 hours (09/14/21 0700)  Scheduled Medications: Compliant   PRN medications: No PRN medications    Staff Report:     \"Pt state she is not suicidal or hearing voices . Quiet . Calm . Visited by her sister , states they were trying to piece together the events on the night that brought her into hospital but that was all she said shrugging her shoulders . Upset that she was not seen by ortho doctor today as she would like to hear the plan for her fractured ankle.\"    \"Pt was the only participant in this session. She requested to learn how to manage paranoid symptoms. She reported paranoia as a significant part of her stressor. Writer focused on two aspects - psycho-education, which emphasized strategies pt could use to identify her early warning signs. The second part focused on ways pt could take action once she senses symptoms of paranoia at early onset. Interventions discussed included self-orientation to name paranoia when it exists, which helps to ground pt toward seeking help. Finally, pt was educated on how to engage her medical team in discussing medication strategies focusing on compliance.\"    Patient Interview:   Tony was interviewed in her room by treatment team. Patient's main concern is when she will be able to leave and comments she is not hearing the same information from treatment team and . Discussed with patient in detail the reasons for 72 hour hold and the court process. Court date is tomorrow, 9/15. Patient reports she has become more accepting of being hospitalized since admission. She is unsure if the medication is helping at this time, but she can comment that it is " "not harming her. She does not feel tired/fatigued as she has on previous medications. Patient further comments that she is not opposed to medication treatment as long as they don't make her feel bad. She acknowledges her behaviors prior to admission were unsafe and that she has a psychiatric illness that will need active medical treatment in the future. Patient is still agreeable to MRI.    Ortho did not visit patient yesterday, 7/13. Patient is still unsure on the plan for her ankle injury. Per Ortho-Surg Epic message: \"Our foot and ankle surgeon is out so we're working with our staff on a plan. Will get back to you when we know\"  Ankle is doing \"fine.\" No pain, except when pressure is placed on the foot. No medications needed for pain.     The risks, benefits, alternatives and side effects of any medication changes have been discussed and are understood by the patient and other caregivers.    Review of systems:     ROS was negative unless noted above.          Allergies:   No Known Allergies         Psychiatric Examination:   /61 (BP Location: Right arm)   Pulse 84   Temp 98.5  F (36.9  C) (Oral)   Resp 16   Wt 59.6 kg (131 lb 4.8 oz)   SpO2 100%   BMI 21.36 kg/m    Weight is 131 lbs 4.8 oz  Body mass index is 21.36 kg/m .    MENTAL STATUS EXAM    Appearance:  no apparent distress, normal posture, well-developed, well-nourished, appears stated age,wearing hospital scrubs  Attitude:  Cooperative, engaged, guarded at times.  Psychomotor:  normal and no evidence of tics, dystonia, or tardive dyskinesia  Eye Contact: appropriate, glances.  Speech:  normal tone and normal rate  Mood: \"ok\"  Affect:  congruent  Thought Content: denies suicidal ideation, denies homicidal ideation and preoccupations regarding discharge from hospital  Thought Process: goal directed and ruminative  Sensorium: awake and alert  Cognition: memory grossly intact  Impulse control: fair  Insight: poor and improving  Judgment: poor      "    Labs:   No results found for this or any previous visit (from the past 24 hour(s)).     Assessment  & Plan      Assessment:   Diagnostic Impression:  Tony Gautam is a 30 year old female with a past psychiatric history of psychosis admitted from outside ER on 09/08/2021 due to concern for psychosis in the context of no active medications. Significant symptoms include psychosis and disorganization. Her last psychiatric hospitalization was in 06/2020.  She does not currently have an outpatient provider, was prescribed an selective serotonin reuptake inhibitor by [facility] ED on 9/6, but had not filled the prescription yet. Notes her sister as a primary social support. Patient denies any current substance use of any kind. Cause of current decompensation is unclear, likely that psychosocial stressors may have been contributory, but patient was unable to relay this information at time of admission.      Her reported symptoms of paranoia, disorganization, and likely AH, periods of depressed mood in the past that are more indicative of negative symptoms of schizophrenia vs schizoaffective disorder vs substance induced psychosis (per chart review and history of substance use, including K2/spice less likely due to negative urine toxycology). By chart review, it appears that her episodes of psychosis may be more severe with each occurrence which may provide stronger rational for a schizophrenia diagnosis. Diagnosis is still in evolution, given patient is poor historian. Optimization of medications to target these symptom clusters in addition to evaluation of adequate outpatient supports will be targets for treatment during this admission.      Today Tony is on hospital day #6.  She is now compliant with psychiatric medication (Court commitment with [facility] supported on 9/10/2021) and also cares for her fracture.  She continues to deny  mental health symptoms and provides rational justifications for her actions or  denies events that led to her hospitalization.      Given that she currently has psychosis/paranoid symptoms, patient warrants continuing  inpatient psychiatric hospitalization to maintain her safety. Disposition pending clinical stabilization, medication optimization and development of an appropriate discharge plan.     Risk for harm is high.  Risk factors: maladaptive coping, past behaviors, previous psychiatric hospitalization/ SI, access to fire arms in the household  and psychosis  Protective factors: family     She was medically cleared for admission to inpatient psychiatric unit. The risks, benefits, alternatives, and side effects of treatments including no treatment have been discussed and are understood by the patient and other caregivers.        Psychiatric Hospital course:   Bertha presented to the [facility] ED with psychosis including symptoms of paranoia and disorganization. Mental status exam was significant for possibly responding to internal stimuli, disorientation, blunted affect, speech latency, illogical and tangential speech, and disorganization. Given her disorganized behavior/psychotic symptoms, physical injury including fall   Prior to ED consulrt, patient was placed on an emergency hospitalization 72 hour hold on 8/9/2021 at 4:55 am due to concerns of safety. Olanzapine 10 mg ODT was given at 1128 am   Tony Gautam was admitted to [facility] from [facility] on a 72 hour hold. Patient had no active medications prior to admission, on 9/8  Olanzapine 2.5 mg at bedtime was scheduled, patient declined. On 9/9 court commitment process with [facility] was started. Orthopedic consult placed. Risperidone 1 mg ODT tab was scheduled on 9/9, patient declined. 9/10  Court commitment with [facility] was supported. 9/13  Risperidone increased from 1 mg ODT at bedtime to 2 mg ODT at bedtime.        Principal Diagnosis:   # Primary Psychiatric Diagnosis:   # Schizophrenia vs Schizoaffective disorder  -  Cont Risperidone 2 mg ODT at bedtime   -Cheeking precautions.    -Hydroxyzine 25 mg PO/IM Q4hrs PRN        Secondary psychiatric & Medical diagnoses of concern this admission:   # 1. Comminuted and impacted calcaneal fracture.   -Per Ortho/Surg: Soft splint placement 9/11  - Orthopedic surgery following patient.  -Tylenol 650 mg PO Q4 hrs PRN for mild-moderate pain  - Ibuprofen 600 mg Q6hrs PRN for moderate pain.        PRN  Medications  - Hydroxyzine 25 mg Q4hrs PRN   - Olanzapine 10 mg PO/IM TID PRN severe agitation/psychosis  - Trazodone 50 mg PRN at bedtime or Melatonin 3mg po at bedtime PRN    - Acetaminophen 650 mg po Q4hrs PRN pain      Legal Status:   Patient in court hold commitment with [facility].       Safety Assessment:   Behavioral Orders   Procedures    Cheeking Precautions (behavioral units)     Patient Observed swallowing PO medications; Patient asked to drink water after swallowing medication; Patient in Staff line of sight for 15 minutes after medication given; Mouth checks after PO administration (patient asked to open mouth and stick out their tongue).    Code 1 - Restrict to Unit    Elopement precautions    Routine Programming     As clinically indicated    Status 15     Every 15 minutes.       Disposition: Pending stabilization & development of a safe discharge plan.          Hannah Ocasio, MS3  [employer]   Attestation:   I was present with the medical student who participated in the service and in the documentation of the note. I have verified the history and personally performed the physical exam and medical decision making. I agree with the assessment and plan of care as documented in the note.    Patient will be treated in therapeutic milieu with appropriate individual and group therapies as described.  The patient was seen and the plan was discussed with the attending physician, Dr. Lizeth Barahona M.D.     Mayco Frazier MD  Psychiatry PGY-1 Resident   Pager:   [phone]    Attestation:  This patient has been seen and evaluated by me, Lizeth Barahona MD.  I have discussed this patient with the psychiatry resident and I agree with the findings and plan in this note.    I have reviewed today's vital signs, medications, labs and imaging. Lizeth Barahona MD

## 2021-09-14 NOTE — PLAN OF CARE
"  Problem: General Rehab Plan of Care  Goal: Therapeutic Recreation/Music Therapy Goal  Description: The patient and/or their representative will achieve their patient-specific goals related to the plan of care.  The patient-specific goals include: mindfulness through process painting  Outcome: Improving     Tony attended art therapy group for 1 hour. She was the only patient in attendance. Tony reported feeling \"good.\" She participated in the art activity to make a process painting starting with scribbling and then adding paint to create shapes. Tony created a painting of a divina. She reported enjoying the activity and feeling able to be more freely creative. Tony talked with writer about how making art reminds her of her  mother. She talked fondly about her mother and the kinds of art they did together. Tony talked about wanting to discharge soon and return to school and work. She expressed confidence in her recovery and her ability to take care of herself after discharge.   "

## 2021-09-14 NOTE — PLAN OF CARE
Problem: Behavior Regulation Impairment (Psychotic Signs/Symptoms)  Goal: Improved Behavioral Control (Psychotic Signs/Symptoms)  Outcome: Improving  Note: Patient mood is calm. She is quietly social. Affect flat. She did attend groups. States her boot on her L leg is uncomfortable. She declines anything for pain. States she is just waiting for Ortho to tell her what the next step is. Thoughts are clear and organized. Denies SI and SIB. Tania Angulo RN

## 2021-09-14 NOTE — PLAN OF CARE
Problem: Cognitive Impairment (Psychotic Signs/Symptoms)  Goal: Optimal Cognitive Function (Psychotic Signs/Symptoms)  Outcome: Improving   Pt state she is not suicidal or hearing voices . Quiet . Calm . Visited by her sister , states they were trying to piece together the events on the night that brought her into hospital but that was all she said shrugging her shoulders . Upset that she was not seen by ortho doctor today as she would like to hear the plan for her fractured ankle

## 2021-09-14 NOTE — PLAN OF CARE
"Pt attended 1 of 3 OT groups today. Pt transitioned to PM OT group IND and engaged in therapeutic group activity and discussion addressing stress management. Pt actively participated in brainstorming various coping skills and applied coping skills to current life situations (e.g. pt ID'd hurting her ankle as a current stressor and possible coping skills as to talk with others, gain perspective, and rest\"). Pt will continue to benefit from OT intervention to address implementation of positive functional coping skills, role performance, and community reintegration.   "

## 2021-09-15 PROCEDURE — 99232 SBSQ HOSP IP/OBS MODERATE 35: CPT | Mod: GC | Performed by: PSYCHIATRY & NEUROLOGY

## 2021-09-15 PROCEDURE — H2032 ACTIVITY THERAPY, PER 15 MIN: HCPCS

## 2021-09-15 PROCEDURE — 250N000013 HC RX MED GY IP 250 OP 250 PS 637

## 2021-09-15 PROCEDURE — 124N000002 HC R&B MH UMMC

## 2021-09-15 RX ADMIN — RISPERIDONE 2 MG: 2 TABLET, ORALLY DISINTEGRATING ORAL at 19:46

## 2021-09-15 ASSESSMENT — ACTIVITIES OF DAILY LIVING (ADL)
HYGIENE/GROOMING: INDEPENDENT
LAUNDRY: WITH SUPERVISION
ORAL_HYGIENE: INDEPENDENT
DRESS: INDEPENDENT

## 2021-09-15 NOTE — PROGRESS NOTES
Pt participated in dance/movement psychotherapy joining synchronous movements with this therapist as a means of solidifying a sense of somatic safety.  Pt explored the fear she felt when she thought her twin sister was in danger and was able to verbalize that her sister was, in fact, not in danger.  She realized her sister did not feel the same fear as her, however her sister did feel some fear and confusion based on the patient's thinking.  Pt reported her treatment team has explained it was her thoughts that were psychotic at the time.  Pt explored the connection between her bodily-felt sense of fear and her thoughts at the time of the incident that brought her into the hospital, as well as her current, more clear state (feeling safe.)  She realized she has felt safe and has trusted her sister is safe since early in her hospitalization.  She also explained she is compliant with the medication treatment proposed by the treatment team.  She is currently just sad that she may miss her 31th birthday with her twin sister on September 20.  Pt was able to explore some possible alternatives to celebrating on the exact date and understand that medical monitoring is generally done for safety reasons.      Pt movement with synergistic and organized with her verbalization and affect.  She appeared lucid and her insight and verbalization increased with seated dancing (from her wheelchair due to a broken foot.)         09/15/21 1130   Dance Movement Therapy   Type of Intervention structured groups   Response participates, initiates socially appropriate   Hours 1

## 2021-09-15 NOTE — PLAN OF CARE
Offers more insight into illness. Inquires about coping skills. She attends all groups. Mood is calm and affect is flat. Reported sore throat. VSS afebrile. Warm beverages given and patient states that it had gone away. Denies SI and SIB. Tania Angulo RN    Problem: Cognitive Impairment (Psychotic Signs/Symptoms)  Goal: Optimal Cognitive Function (Psychotic Signs/Symptoms)  Outcome: Improving

## 2021-09-15 NOTE — PROGRESS NOTES
"  ----------------------------------------------------------------------------------------------------------  [facility], [place]   Psychiatric Progress Note  Hospital Day #7     Interim History:   The patient's care was discussed with the treatment team and chart notes were reviewed.    Sleep 7 hours (09/15/21 0700)  Scheduled Medications: Compliant   PRN medications: No PRN medications    Staff Report:     \"Patient mood is calm. She is quietly social. Affect flat. She did attend groups. States her boot on her L leg is uncomfortable. She declines anything for pain. States she is just waiting for Ortho to tell her what the next step is. Thoughts are clear and organized. Denies SI and SIB.\"    \"Pt actively participated in brainstorming various coping skills and applied coping skills to current life situations (e.g. pt ID'd hurting her ankle as a current stressor and possible coping skills as to talk with others, gain perspective, and rest\")\"    Patient Interview:   Bertha was interviewed in her room by treatment team. Patient had court appearance earlier this morning, 9/15. She said it went \"ok,\" but she is confused about the process and continues to feel that she is getting conflicting answers from hospital staff//court. She reports she was told in court today that \"the court is not keeping her here, the doctors are, however, tells [facility]  that treatment team will discharge her today. \" Patient \"feels forced\" to be in the hospital and is wondering about a plan for discharge. Discussed length of stay likely 2-3 weeks with the goal of patient being able to discharge home safely and adequate follow up plans. She does not feel safe in the hospital because of this feeling of forced stay and she does not believe she needs to be in the hospital to receive medications, which she can and will do at home. Patient feels \"happier since starting the medication.\" No adverse " "effects. Patient continues to deny mental health issues and provides justification for actions that led to hospitalization. She believes that her sister is \"ok and safe at home.\" However, patient seems guarded. Continuing inpatient hospitalization and medication management is strongly recommended by treatment team.      Per Ortho-Surg Epic note completed AM of 9/15: \"Will plan to treat left calcaneus fracture non-operatively at this time. Continue NWB LLE in bulky Montoya splint. Encourage elevation of LLE. Plan for follow up in ~2 weeks as outpatient.\" Discussed this with patient. Foot feels fine, no pain. Patient is worried if the fracture will heal correctly without surgical intervention.    The risks, benefits, alternatives and side effects of any medication changes have been discussed and are understood by the patient and other caregivers.    Review of systems:     ROS was negative unless noted above.          Allergies:   No Known Allergies         Psychiatric Examination:   /73   Pulse 86   Temp 98.3  F (36.8  C)   Resp 16   Wt 59.6 kg (131 lb 4.8 oz)   SpO2 100%   BMI 21.36 kg/m    Weight is 131 lbs 4.8 oz  Body mass index is 21.36 kg/m .    MENTAL STATUS EXAM    Appearance:  No apparent distress, normal posture, well-developed, well-nourished, appears stated age, wearing hospital scrubs  Attitude:  Cooperative, engaged, guarded at times   Psychomotor:  normal and no evidence of tics, dystonia, or tardive dyskinesia  Eye Contact: appropriate, glances.  Speech:  normal tone and normal rate, somewhat hesitant.  Mood: \"ok\"  Affect:  Incongruent, blunted.  Thought Content: denies suicidal ideation, denies homicidal ideation and preoccupations regarding discharge from hospital and court commitment process.  Thought Process: goal directed and ruminative  Sensorium: awake and alert  Cognition: memory grossly intact  Impulse control: fair  Insight: poor and improving  Judgment: poor         Labs:   No " results found for this or any previous visit (from the past 24 hour(s)).     Assessment  & Plan      Assessment:   Diagnostic Impression:  Tony Gautam is a 30 year old female with a past psychiatric history of psychosis admitted from outside ER on 09/08/2021 due to concern for psychosis in the context of no active medications. Significant symptoms include psychosis and disorganization. Her last psychiatric hospitalization was in 06/2020.  She does not currently have an outpatient provider, was prescribed an selective serotonin reuptake inhibitor by [facility] on 9/6, but had not filled the prescription yet. Notes her sister as a primary social support. Patient denies any current substance use of any kind. Cause of current decompensation is unclear, likely that psychosocial stressors may have been contributory, but patient was unable to relay this information at time of admission.      Her reported symptoms of paranoia, disorganization, and likely AH, periods of depressed mood in the past that are more indicative of negative symptoms of schizophrenia vs schizoaffective disorder vs substance induced psychosis (per chart review and history of substance use, including K2/spice less likely due to negative urine toxycology). By chart review, it appears that her episodes of psychosis may be more severe with each occurrence which may provide stronger rational for a schizophrenia diagnosis. Diagnosis is still in evolution, given patient is poor historian. Optimization of medications to target these symptom clusters in addition to evaluation of adequate outpatient supports will be targets for treatment during this admission.      Today Tony is on hospital day #7.  She continues to be compliant with psychiatric medication (Court commitment with [facility] supported on 9/10/2021) and also cares for her fracture. [place] staff this [place]. Will monitor case.     Given that she currently has  psychosis/paranoid symptoms, patient warrants continuing  inpatient psychiatric hospitalization to maintain her safety. Disposition pending clinical stabilization, medication optimization and development of an appropriate discharge plan.     Risk for harm is high.  Risk factors: maladaptive coping, past behaviors, previous psychiatric hospitalization/ SI, access to fire arms in the household  and psychosis  Protective factors: family      She was medically cleared for admission, being followed by orthopedic surgery while inpatient and admitted to inpatient psychiatric unit. The risks, benefits, alternatives, and side effects of treatments including no treatment have been discussed and are understood by the patient and other caregivers.        Psychiatric Hospital course:   Bertha presented to the [place] ED with psychosis including symptoms of paranoia and disorganization. Mental status exam was significant for possibly responding to internal stimuli, disorientation, blunted affect, speech latency, illogical and tangential speech, and disorganization. Given her disorganized behavior/psychotic symptoms, physical injury including fall   Prior to ED consult, patient was placed on an emergency hospitalization 72 hour hold on 8/9/2021 at 4:55 am due to concerns of safety. Olanzapine 10 mg ODT was given at 1128 am   Tony Gautam was admitted to [facility] from [place] ED on a 72 hour hold. Patient had no active medications prior to admission, on 9/8  Olanzapine 2.5 mg at bedtime was scheduled, patient declined. On 9/9 court commitment process with [facility] was started. Orthopedic consult placed. Risperidone 1 mg ODT tab was scheduled on 9/9, patient declined. 9/10  Court commitment with [facility] was supported. 9/13  Risperidone increased from 1 mg ODT at bedtime to 2 mg ODT at bedtime. 9/15 per Ortho/Surgery, patient will be continue to be treated non-operatively.        Principal Diagnosis:   # Primary  Psychiatric Diagnosis:   # Schizophrenia vs Schizoaffective disorder  - Continue Risperidone 2 mg ODT at bedtime, reassess.  -Cheeking precautions.    -Hydroxyzine 25 mg PO/IM Q4hrs PRN        Secondary psychiatric & Medical diagnoses of concern this admission:   # 1. Comminuted and impacted calcaneal fracture.   -Per Ortho/Surg: Soft splint placement 9/11  - Orthopedic surgery following patient: Non-operative management.  -Tylenol 650 mg PO Q4 hrs PRN for mild-moderate pain  - Ibuprofen 600 mg Q6hrs PRN for moderate pain.        PRN  Medications  - Hydroxyzine 25 mg Q4hrs PRN   - Olanzapine 10 mg PO/IM TID PRN severe agitation/psychosis  - Trazodone 50 mg PRN at bedtime or Melatonin 3mg po at bedtime PRN    - Acetaminophen 650 mg po Q4hrs PRN pain      Legal Status:   Patient in court hold commitment with [facility].       Safety Assessment:   Behavioral Orders   Procedures    Cheeking Precautions (behavioral units)     Patient Observed swallowing PO medications; Patient asked to drink water after swallowing medication; Patient in Staff line of sight for 15 minutes after medication given; Mouth checks after PO administration (patient asked to open mouth and stick out their tongue).    Code 1 - Restrict to Unit    Elopement precautions    Routine Programming     As clinically indicated    Status 15     Every 15 minutes.       Disposition: Pending court commitment process, patient stabilization & development of a safe discharge plan.     Patient will be treated in therapeutic milieu with appropriate individual and group therapies as described.The patient was seen and the plan was discussed with the attending physician, Dr. Lizeth Barahona M.D.      Hannah Ocasio, MS3  [employer]     Attestation:   I was present with the medical student who participated in the service and in the documentation of the note. I have verified the history and personally performed the physical exam and medical  decision making. I agree with the assessment and plan of care as documented in the note.      Mayco Frazier MD  PGY-1 Psychiatry  Pager:  [phone]    Attestation:  This patient has been seen and evaluated by me, Lizeth Barahona MD.  I have discussed this patient with the psychiatry resident and I agree with the findings and plan in this note.    I have reviewed today's vital signs, medications, labs and imaging. Lizeth Barahona MD

## 2021-09-15 NOTE — PLAN OF CARE
Assessment/Intervention/Current Symptoms and Care Coordination  - chart review  - team meeting    Re court hearing process today -   Spoke with [facility]  Vickie during court process - she was calling to discuss and clarify as patient had been telling her  that the hospital provider is saying she will be discharged today. discusssed with Suzette that the provider has discussed that recommend she will be here on the unit for estimated time frame of a couple weeks. Also discussed with Vickie that we also do discuss with patient's that during the court process if at the preliminary hearing the court/ were to determine no cause to continue to hold that we legally must then discharge them.  Following hearing also heard from Vickie that patient's belief was that provider was going to discharge patient in the next day or two. Confirmed with Vickie that this is not accurate as patient was not given this feedback from provider.     Later patient made comment to provider that she was told today during process that she can discharge home to finish the court process and there is not an ongoing hold currently.  This writer then spoke with Vickie further - she reports that the  advocated for the order for outcome from preliminary hearing include language along with the order for continued hold that if the treating provider were to assess that patient is improved and recommends discharge that patient can be discharged. Per Vickie the 's office also reported estimate that the new order with results from today's hearing, new date of final and new language as mentioned should be sent in the next 24 hours.     Review of original apprehension / hold order again today does indicate hold, also indicates that examiner is assigned for exam on 9/21/21 and final hearing was to be held on 9/23 - per discussion with Vickie final hearing is being moved to Friday 9/24/21        Discharge Plan or  Goal  Pending stabilization & development of a safe discharge plan.  Considerations include: assessment ongoing       Barriers to Discharge   Patient requires further psychiatric stabilization due to current symptomology      Referral Status  No new referrals made today      Legal Status  Patient is on a court hold in [facility]     Preliminary Hearing Probable Cause Hearing held today     Final hearing to be changed from 9/23/21 to 9/24/21  - will update when new orders which will include new time for the 24th are received on the unit.

## 2021-09-15 NOTE — PLAN OF CARE
"  Problem: Sleep Disturbance  Goal: Adequate Sleep/Rest  Outcome: No Change   Observed to have slept well for approx 7 hrs on all Q 15\" rounds overnight w/regular non-labored respirations and no reported or observed distress.  P. O. Fluids kept at BS. Lt. Foot maintained in bulky  Montoya Splint w/ Ace Wrapping and elevated on several pillows.  Wheelchair kept w/i easy reach at BS for mobility.  Safe, therapeutic environment maintained.  "

## 2021-09-15 NOTE — PROGRESS NOTES
Brief Orthopaedic Surgery Note    Discussed patient's case with Dr. Alexander, foot and ankle staff. Will plan to treat left calcaneus fracture non-operatively at this time. Continue NWB LLE in bulky Montoya splint. Encourage elevation of LLE. Plan for follow up in ~2 weeks as outpatient in Dr. Alexander's clinic. Discussed with primary team.     Assessment and plan discussed with staff surgeon, Dr. Alexander.    Ayesha Asher MD  PGY-4  Orthopaedic Surgery

## 2021-09-16 PROCEDURE — 250N000013 HC RX MED GY IP 250 OP 250 PS 637

## 2021-09-16 PROCEDURE — 124N000002 HC R&B MH UMMC

## 2021-09-16 PROCEDURE — 99232 SBSQ HOSP IP/OBS MODERATE 35: CPT | Mod: GC | Performed by: PSYCHIATRY & NEUROLOGY

## 2021-09-16 RX ADMIN — RISPERIDONE 2 MG: 2 TABLET, ORALLY DISINTEGRATING ORAL at 20:06

## 2021-09-16 ASSESSMENT — ACTIVITIES OF DAILY LIVING (ADL)
ORAL_HYGIENE: INDEPENDENT
DRESS: INDEPENDENT
LAUNDRY: WITH SUPERVISION
HYGIENE/GROOMING: INDEPENDENT

## 2021-09-16 NOTE — PLAN OF CARE
Assessment/Intervention/Current Symptoms and Care Coordination  - chart review  - team meeting     Discharge Plan or Goal  Pending stabilization & development of a safe discharge plan.  Considerations include: assessment ongoing     Barriers to Discharge   Patient requires further psychiatric stabilization due to current symptomology      Referral Status  No new referrals made    Legal Status   Patient is on a court hold in [facility]     Preliminary Hearing Probable Cause Hearing held 9/16/2021     Final hearing to be changed from 9/23/21 to 9/24/21  - will update when new orders which will include new time for the 24th are received on the unit.

## 2021-09-16 NOTE — PLAN OF CARE
Expressing concern about her fractured leg and seeing ortho again. Denies Pain this morning. Out for all meals. Interacts when she is with others.Appears somewhat irritable  and distractible today. Upset that her family said they have not heard from the Dr yet. She denies SI and SIB. Tania Angulo RN   Problem: Cognitive Impairment (Psychotic Signs/Symptoms)  Goal: Optimal Cognitive Function (Psychotic Signs/Symptoms)  Outcome: Improving

## 2021-09-16 NOTE — PLAN OF CARE
"  Problem: General Rehab Plan of Care  Goal: Therapeutic Recreation/Music Therapy Goal (Art Therapy)  Description: The patient and/or their representative will achieve their patient-specific goals related to the plan of care.  The patient-specific goals include: emotional expression  Outcome: Improving      Art Therapy directive is to create two drawings depicting calm mind vs anxious mind using lines, shapes and colors and art media of pts choice (oil pastels, chalk pastels, markers.)   Goals of directive: emotional expression, emotional regulation, mindfulness.  Pt was a quiet participant, focused on task for the majority of group. Pt finished drawing(s) and briefly shared with group. Pt described calm as having all of her thoughts and areas in her life (family, friends, work) \"organized\" in her brain. Pt aristides small squares in her calm drawing to symbolize these areas of her life.   Pt aristides small black squares in her anxiety drawing to symbolize racing, negative thoughts which she described as \"blocking out all the areas of my life\" and becoming overwhelming for pt.  Pts mood was calm in group, pleasant participant.          "

## 2021-09-16 NOTE — PLAN OF CARE
Problem: Behavioral Health Plan of Care  Goal: Optimized Coping Skills in Response to Life Stressors  Outcome: Improving   Pt states shr is feeling better , clear thinking , mainly upset about being here and the court hold . She states she would like to talk to the orthopedic surgeon about surgery on her ankle because now she is thinking she wants to have the surgery . Plans to talk with the doctors in the morning

## 2021-09-16 NOTE — PROGRESS NOTES
Patient declined having a 7 day covid-19 test.  Instructed patient to let her nurse know if she changes her mind.  She agreed.

## 2021-09-16 NOTE — PROGRESS NOTES
"  ----------------------------------------------------------------------------------------------------------  [facility], [place]   Psychiatric Progress Note  Hospital Day #8     Interim History:   The patient's care was discussed with the treatment team and chart notes were reviewed.    Sleep 7 hours (09/16/21 0600)  Scheduled Medications:   PRN medications:     Staff Report:   \"Offers more insight into illness. Inquires about coping skills. She attends all groups. Mood is calm and affect is flat. Reported sore throat. VSS afebrile. Warm beverages given and patient states that it had gone away. Denies SI and SIB\"     \"Pt explored the fear she felt when she thought her twin sister was in danger and was able to verbalize that her sister was, in fact, not in danger.  She realized her sister did not feel the same fear as her, however her sister did feel some fear and confusion based on the patient's thinking.  Pt reported her treatment team has explained it was her thoughts that were psychotic at the time.  Pt explored the connection between her bodily-felt sense of fear and her thoughts at the time of the incident that brought her into the hospital, as well as her current, more clear state (feeling safe.)  She realized she has felt safe and has trusted her sister is safe since early in her hospitalization.  She also explained she is compliant with the medication treatment proposed by the treatment team.\"    Patient Interview:   Tony was interviewed in her room by treatment team. Patient's main concern continues to be discharge. She brings up conflicting information she is getting from doctors// about the hospitalization and the court process. She feels she is being hospitalized involuntarily and that is not legal according to what the  explained to her in court yesterday, 9/15. Patient comments when she talks to her family and friends \"they want me home.\" She " "reports she explained events that occurred prior to the hospitalization with her sister and her sister is understanding of why she took the actions she did. Patient believes she is safe to go home and take medications as outpatient and \"my family would never say they want me in here.\" Mood is \"fine.\" No changes from previous days. No adverse reactions to medication.     Foot is feeling fine when elevated. Pain with weight bearing. Denies any changes in pain, range of motion, change in coloration of toes. Patient is confused about treatment plan because she has not seen ortho for many days. Discussed with the patient that the current plan is for non operative management of her ankle with follow up in 2 weeks unless something changes with the status of her injury (increase in pain, change in color, fever, chills, etc). Patient understanding and is comfortable with this plan.     The risks, benefits, alternatives and side effects of any medication changes have been discussed and are understood by the patient and other caregivers.    Review of systems:     ROS was negative unless noted above.          Allergies:   No Known Allergies         Psychiatric Examination:   /68 (BP Location: Right arm)   Pulse 70   Temp 98.2  F (36.8  C) (Oral)   Resp 16   Wt 59.6 kg (131 lb 4.8 oz)   SpO2 97%   BMI 21.36 kg/m    Weight is 131 lbs 4.8 oz  Body mass index is 21.36 kg/m .    MENTAL STATUS EXAM    Appearance:  No apparent distress, normal posture, well-developed, well-nourished, appears stated age, wearing hospital scrubs  Attitude:  Cooperative, engaged, guarded  Psychomotor:  normal and no evidence of tics, dystonia, or tardive dyskinesia  Eye Contact: appropriate, glances.  Speech:  normal tone and normal rate, somewhat hesitant.  Mood: \"fine\" - redirects to discussing leaving  Affect:  Incongruent, blunted.  Thought Content: denies suicidal ideation, denies homicidal ideation and preoccupations regarding discharge " from hospital and court commitment process.  Thought Process: goal directed and ruminative  Sensorium: awake and alert  Cognition: memory grossly intact  Impulse control: fair  Insight: poor and improving  Judgment: poor         Labs:   No results found for this or any previous visit (from the past 24 hour(s)).     Assessment  & Plan      Assessment:   Diagnostic Impression:  Tony Gautam is a 30 year old female with a past psychiatric history of psychosis admitted from outside ER on 09/08/2021 due to concern for psychosis in the context of no active medications. Significant symptoms include psychosis and disorganization. Her last psychiatric hospitalization was in 06/2020.  She does not currently have an outpatient provider, was prescribed an selective serotonin reuptake inhibitor by [facility] on 9/6, but had not filled the prescription yet. Notes her sister as a primary social support. Patient denies any current substance use of any kind. Cause of current decompensation is unclear, likely that psychosocial stressors may have been contributory, but patient was unable to relay this information at time of admission.      Her reported symptoms of paranoia, disorganization, and likely AH, periods of depressed mood in the past that are more indicative of negative symptoms of schizophrenia vs schizoaffective disorder vs substance induced psychosis (per chart review and history of substance use, including K2/spice less likely due to negative urine toxycology). By chart review, it appears that her episodes of psychosis may be more severe with each occurrence which may provide stronger rational for a schizophrenia diagnosis. Diagnosis is still in evolution, given patient is poor historian. Optimization of medications to target these symptom clusters in addition to evaluation of adequate outpatient supports will be targets for treatment during this admission.      Today Tony is on hospital day #8.  She  continues to be compliant with psychiatric medication (Court commitment with [employer] supported on 9/10/2021) and also cares for her fracture. Patient had interview with [facility] staff 9/15, final hearing scheduled for 9/24. Will monitor case.     Given that she currently has psychosis/paranoid symptoms, patient warrants continuing  inpatient psychiatric hospitalization to maintain her safety. Disposition pending clinical stabilization, medication optimization and development of an appropriate discharge plan.     Risk for harm is high.  Risk factors: maladaptive coping, past behaviors, previous psychiatric hospitalization/ SI, access to fire arms in the household  and psychosis  Protective factors: family      She was medically cleared for admission, being followed by orthopedic surgery while inpatient and admitted to inpatient psychiatric unit. The risks, benefits, alternatives, and side effects of treatments including no treatment have been discussed and are understood by the patient and other caregivers.        Psychiatric Hospital course:   Bertha presented to the [place] ED with psychosis including symptoms of paranoia and disorganization. Mental status exam was significant for possibly responding to internal stimuli, disorientation, blunted affect, speech latency, illogical and tangential speech, and disorganization. Given her disorganized behavior/psychotic symptoms, physical injury including fall   Prior to ED consult, patient was placed on an emergency hospitalization 72 hour hold on 8/9/2021 at 4:55 am due to concerns of safety. Olanzapine 10 mg ODT was given at 1128 am   Tony Gautam was admitted to [facility] from [place] [facility] on a 72 hour hold. Patient had no active medications prior to admission, on 9/8  Olanzapine 2.5 mg at bedtime was scheduled, patient declined. On 9/9 court commitment process with [employer] was started. Orthopedic consult placed. Risperidone 1 mg ODT tab was scheduled on 9/9,  patient declined. 9/10  Court commitment with [employer] was supported. 9/13  Risperidone increased from 1 mg ODT at bedtime to 2 mg ODT at bedtime. 9/15 per Ortho/Surgery, patient will be continue to be treated non-operatively.        Principal Diagnosis:   # Primary Psychiatric Diagnosis:   # Schizophrenia vs Schizoaffective disorder  - Continue Risperidone 2 mg ODT at bedtime, reassess.  -Cheeking precautions.    -Hydroxyzine 25 mg PO/IM Q4hrs PRN        Secondary psychiatric & Medical diagnoses of concern this admission:   # 1. Comminuted and impacted calcaneal fracture.   -Per Ortho/Surg: Soft splint placement 9/11  - Orthopedic surgery following patient: Non-operative management.  -Tylenol 650 mg PO Q4 hrs PRN for mild-moderate pain  - Ibuprofen 600 mg Q6hrs PRN for moderate pain.        PRN  Medications  - Hydroxyzine 25 mg Q4hrs PRN   - Olanzapine 10 mg PO/IM TID PRN severe agitation/psychosis  - Trazodone 50 mg PRN at bedtime or Melatonin 3mg po at bedtime PRN    - Acetaminophen 650 mg po Q4hrs PRN pain      Legal Status:   Patient in court hold commitment with [employer].       Safety Assessment:   Behavioral Orders   Procedures     Cheeking Precautions (behavioral units)     Patient Observed swallowing PO medications; Patient asked to drink water after swallowing medication; Patient in Staff line of sight for 15 minutes after medication given; Mouth checks after PO administration (patient asked to open mouth and stick out their tongue).     Code 1 - Restrict to Unit     Elopement precautions     Routine Programming     As clinically indicated     Status 15     Every 15 minutes.       Disposition: Pending  patient stabilization, court commitment process final hearing on 9/24/2021, & development of a safe discharge plan.     Patient will be treated in therapeutic milieu with appropriate individual and group therapies as described.The patient was seen and the plan was discussed with the attending physician,   Lizeth Barahona M.D.      Hannah Ocasio, MS3  [employer]     Attestation:   I was present with the medical student who participated in the service and in the documentation of the note. I have verified the history and personally performed the physical exam and medical decision making. I agree with the assessment and plan of care as documented in the note.      Mayco Frazier MD  PGY-1 Psychiatry  Pager:  [phone]    Attestation:  This patient has been seen and evaluated by me, Lizeth Barahona MD.  I have discussed this patient with the psychiatry resident and I agree with the findings and plan in this note.    I have reviewed today's vital signs, medications, labs and imaging. Lizeth Barahona MD

## 2021-09-16 NOTE — PLAN OF CARE
"  Problem: Sleep Disturbance  Goal: Adequate Sleep/Rest  Outcome: No Change     Problem: Fracture Stabilization and Management (Orthopaedic Fracture)  Goal: Fracture Stability  Outcome: No Change     Problem: Functional Ability Impaired (Orthopaedic Fracture)  Goal: Optimal Functional Ability  Outcome: No Change     Problem: Pain (Orthopaedic Fracture)  Goal: Acceptable Pain Control  Outcome: No Change   Observed to have slept well for approx 7 hrs on all Q 15\" rounds overnight w/regular non-labored respirations and no reported or observed distress.  Lt. Foot w/ Montoya Splint and Ace Wrap; elevated on several pillows.  PO fluids maintained at BS.  Wheelchair w/I easy reach at BS for mobility.  Safe, therapeutic environment maintained.   "

## 2021-09-17 PROCEDURE — 99231 SBSQ HOSP IP/OBS SF/LOW 25: CPT | Mod: GC | Performed by: PSYCHIATRY & NEUROLOGY

## 2021-09-17 PROCEDURE — 124N000002 HC R&B MH UMMC

## 2021-09-17 PROCEDURE — G0177 OPPS/PHP; TRAIN & EDUC SERV: HCPCS

## 2021-09-17 PROCEDURE — 250N000013 HC RX MED GY IP 250 OP 250 PS 637

## 2021-09-17 RX ADMIN — RISPERIDONE 2 MG: 2 TABLET, ORALLY DISINTEGRATING ORAL at 20:04

## 2021-09-17 ASSESSMENT — ACTIVITIES OF DAILY LIVING (ADL)
LAUNDRY: WITH SUPERVISION
HYGIENE/GROOMING: INDEPENDENT
ORAL_HYGIENE: INDEPENDENT
DRESS: INDEPENDENT

## 2021-09-17 NOTE — PLAN OF CARE
Problem: Sleep Disturbance  Goal: Adequate Sleep/Rest  Outcome: Improving   Pt slept 7 hours this sight,respirations easy.collection of urine for utox and hcg remain unobtained at this time

## 2021-09-17 NOTE — PLAN OF CARE
"  Problem: Behavior Regulation Impairment (Psychotic Signs/Symptoms)  Goal: Improved Behavioral Control (Psychotic Signs/Symptoms)  Outcome: Improving     Problem: Functional Ability Impaired (Orthopaedic Fracture)  Goal: Optimal Functional Ability  Outcome: Improving    Pt was in the lounge start of shift, flat/blunted affect, calm mood, brightens on approach.  Isolative to her room at times. Made several phone calls, reminded to transfer slowly and carefully to protect her L fractured ankle, patient was observed sit down very quickly when transferring. Had a visit with her sister. Ate supper and is med compliant. Bertha denies SI/SIB/AVH and \"just wants to discharge\". Watched a movie before bed.      "

## 2021-09-17 NOTE — PLAN OF CARE
Pt  s  mood and thinking is improved. She is requestingn the doctors to call her father or sister tomorrow to discuss with them her plan of care . Quiet, pleasant , in lounge with ankle elevated , no complaints of pain

## 2021-09-17 NOTE — PLAN OF CARE
BEHAVIORAL TEAM DISCUSSION    Participants:   Lizeth Barahona MD Attending Psychiatrist  Mayco Frazier MD, PGY1  Jaye Farmer, MSW, [facility] [place] Clinical Treatment Coordinator  Amanda Paige, RN  Emily Ocasio, MS3  Dianna James, MS3  Progress: improving  Anticipated length of stay: assessment ongoing  Continued Stay Criteria/Rationale: medication management for targeted symptom improvement ongoing.   Medical/Physical:   Per psychiatry physician progress note:   Medical diagnoses of concern this admission:   # 1. Comminuted and impacted calcaneal fracture.   -Per Ortho/Surg: Soft splint placement 9/11  - Orthopedic surgery following patient: Non-operative management.  -Tylenol 650 mg PO Q4 hrs PRN for mild-moderate pain  - Ibuprofen 600 mg Q6hrs PRN for moderate pain.  Precautions:   Behavioral Orders   Procedures    Cheeking Precautions (behavioral units)     Patient Observed swallowing PO medications; Patient asked to drink water after swallowing medication; Patient in Staff line of sight for 15 minutes after medication given; Mouth checks after PO administration (patient asked to open mouth and stick out their tongue).    Code 1 - Restrict to Unit    Elopement precautions    Routine Programming     As clinically indicated    Status 15     Every 15 minutes.     Plan: medication management for targeted symptom improvement is ongoing. Civil commitment (MI) with craig request process ongoing   Rationale for change in precautions or plan: per ongoing assessment.     ____  I have reviewed this note. Lizeth Barahona MD

## 2021-09-17 NOTE — PLAN OF CARE
"Assessment/Intervention/Current Symptoms and Care Coordination  - chart review  - team meeting  - met with Tony - she says she is feelign frustrated as getting mixed messages re being held here. Mentions her 72 hour hold was over, we reviewed the holds and this writer also recieved the court order for ongoing hold in the hospital that was issued. She said in court the other day was told she could be released. This writer discussed with her that I was told by the  that the court spoke about including language in order they will be sending that is to be held and include some language that IF the provider recommends discharge could be discharged. Informed her that this updated order has not yet come for the court and also emphasized that wanted to be clear even when the order does come the provider hasn't yet recommended discharge.   She says the doctor hasn't told her reasons for why she should still stay in the hospital \"there is nothing wrong with me\" \"I have been taking the medicine and they have observed me\".she says it would be helpful for her if she was provided a possible timeline and reasons for this in regards to recommended ongoing hospitalization. \"  This writer provided her with patient relations number as well.   Also informed her that I will communicate with the court  inquiring re orders - again clarifying/stating to her that if order is received with updated language today discharge is not yet recommended.     - communication with [facility]  Vickie - stated had not yet received updated orders yet - message back from Vickie at 1:23pm saying she also had not seen updated orders issued at this time. Has reached out to the 's to inquire.     - behavioral team discussion note completed for weekly plan of care.     - addendum - updated order received on unit and copy given to patient . She asked if the provider would be informed that the order came. " Informed her that yes will be updated as included in chart and we communicate.   Appears patient may be having difficulties with recall / understanding as this writer has had previous discussions with her today that there is not a current recommendation from the provider that she discharge.     Provider is updated     Call on unit from patient's father Rogelio -  [phone]  This writer spoke with him directly - he asked why Tony is still being held in the hospital, discussed with him that it is recommended she continue to be in [facility] for ongoing medication treatment. He commented that she is taking her medication now. Discussed with him that yes and still continuing to monitor for improvement. He said he has been hoping to hear from the doctor and has called the unit multiple times today asking to talk to a doctor and is told that the doctor is not on the unit. Informed him I will reach out to the doctor to call him. He said that patient's sister Alicia is busier right now in regards to best time to call her says she is in school and works so if she doesn't answer she would call the unit back. I asked if there was a best time to have the provider call him back he said in the next 10 minutes (our call was at 3:10pm) I informed him I cannot guarantee a call back in 10 minutes from the provider I can however reach out to the doctor asking they call him.    This writer sent page to Dr. Frazier relaying request.  Earlier messages given to provider by unit staff as well.     Addendum - 3:36pm  Later saw message that patient's sister left with  that best to call her back as father is still in TCU. Page sent to Dr. Frazier       Discharge Plan or Goal  Pending stabilization & development of a safe discharge plan.  Considerations include: assessment ongoing - likely discharge home with out patient follow up for mental health     Barriers to Discharge   Patient requires further psychiatric stabilization due to  current symptomology    Referral Status  No new referrals at this time    Legal Status   Patient is on a court hold in [facility]     Preliminary Hearing Probable Cause Hearing held 9/16/2021 Friday September 24, 2021 - 11:00 am Final Commitment Hearing     Order from preliminary Hearing and Notice of Remote Zoom Hearing (final hearing schedule) received on unit    Per Order - (Please refer to copy of order for exact language as this writer is charting in best effort to convey language of order)  - A Judicial Commitment Hearing will be conducted virtually via Zoom September 24, 2021 at 11:00am    - Until such hearing the Respondent be involuntarily held at [facility]. Respondent may be released prior to the final Judicial Commitment hearing if her treating physician at [facility] deems her sufficiently stable for discharge.    Copies of order placed in paper chart and placed for H.I.M scan into media of EHR    Per internal process this writer sent message to behavioral administration staff regarding need to schedule ITV hearing
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
## 2021-09-18 PROCEDURE — 124N000002 HC R&B MH UMMC

## 2021-09-18 PROCEDURE — 250N000013 HC RX MED GY IP 250 OP 250 PS 637

## 2021-09-18 RX ADMIN — RISPERIDONE 2 MG: 2 TABLET, ORALLY DISINTEGRATING ORAL at 20:04

## 2021-09-18 ASSESSMENT — ACTIVITIES OF DAILY LIVING (ADL)
LAUNDRY: WITH SUPERVISION
HYGIENE/GROOMING: INDEPENDENT
ORAL_HYGIENE: INDEPENDENT
DRESS: INDEPENDENT
DRESS: INDEPENDENT
HYGIENE/GROOMING: INDEPENDENT
LAUNDRY: WITH SUPERVISION
ORAL_HYGIENE: INDEPENDENT

## 2021-09-18 NOTE — PLAN OF CARE
"  Problem: Sleep Disturbance  Goal: Adequate Sleep/Rest  Outcome: No Change     Problem: Functional Ability Impaired (Orthopaedic Fracture)  Goal: Optimal Functional Ability  Outcome: No Change   Observed to have slept well for approx 7 hrs on all Q 15\" rounds overnight with regular non-labored respirations and no reported or observed distress .  Montoya splint/Ace wrap in tact to left ft and kept elevated on several pillows.  Wheelchair kept w/I easy reach as BS for mobility.  PO fluids maintained at BS.  Safe, therapeutic environment maintained.  "

## 2021-09-18 NOTE — PLAN OF CARE
Problem: Behavioral Health Plan of Care  Goal: Optimized Coping Skills in Response to Life Stressors  Outcome: Improving     Problem: Behavioral Disturbance  Goal: Behavioral Disturbance  Description: Signs and symptoms of listed problems will be absent or manageable by discharge or transition of care.  Outcome: Improving    Received patient sleeping in her room when shift commenced, slept till just before supper after which she sat in the lounge.  Mood is calm/quiet affect flat/blunted. Patient states she is frustrated and bored and that is why she was sleeping.  States that  doctors have not told her whey she should not discharge. States family thinks she is ready. Patient believes she may be here for a long time and three weeks is already long enough. Denies SI/SIB/AVH/Depression/Anxiety.  Ate dinner and is med compliant. Patient assisted to use the computer to do her school work, states she was given a week extension for summer classes that have ended.

## 2021-09-18 NOTE — PLAN OF CARE
Problem: Mood Impairment (Psychotic Signs/Symptoms)  Goal: Improved Mood Symptoms (Psychotic Signs/Symptoms)  Outcome: No Change  Note: Patient isolates to room much of shift. Comes out for meals. Affect is flat and she appears anxious at times. Is concerned about doing the work for a class she is taking. Requesting if she can use computer to watch lectures. She is agreeable to watch it in small increments at staff convenience. Thoughts are organized.Denies need to be in hospital. Upset because in her new admit folder, an insert states that visitors can bring in food. Denies SI and SIB.    Tania Angulo RN

## 2021-09-19 PROCEDURE — G0177 OPPS/PHP; TRAIN & EDUC SERV: HCPCS

## 2021-09-19 PROCEDURE — 124N000002 HC R&B MH UMMC

## 2021-09-19 PROCEDURE — 250N000013 HC RX MED GY IP 250 OP 250 PS 637

## 2021-09-19 RX ADMIN — RISPERIDONE 2 MG: 2 TABLET, ORALLY DISINTEGRATING ORAL at 20:54

## 2021-09-19 NOTE — PROGRESS NOTES
Tony attended OT clinic this morning. Declined to work on a structured creative project but chatted actively with peers, discussing the episode of paranoia that led to her hospitalization and her eagerness to get back to work & school.      09/19/21 1400   Occupational Therapy   Type of Intervention structured groups   Response Participates with encouragement   Hours 1

## 2021-09-19 NOTE — PLAN OF CARE
Problem: Behavior Regulation Impairment (Psychotic Signs/Symptoms)  Goal: Improved Behavioral Control (Psychotic Signs/Symptoms)  Outcome: Improving  Note: Asked for fresh linens this morning and changed her bed independently. Thoughts are clear and organized. She was looking forward to her visitor this afternoon. She is concerned about finishing the class work that is needed for school. Was appreciative of the reassurance that staff will sit with her while she finishes it on the computer. Denies SI and SIB. Tania Angulo RN

## 2021-09-19 NOTE — PLAN OF CARE
"  Problem: Sleep Disturbance  Goal: Adequate Sleep/Rest  Outcome: No Change     Problem: Functional Ability Impaired (Orthopaedic Fracture)  Goal: Optimal Functional Ability  Outcome: No Change   Observed to have slept well for approx 3 hrs. On all Q 15\" rounds overnight w/ regular non-labored respirations and no reported or observed distress.  Jan Splint/Ace wrap in tact to Left Ft.  w/ same elevated on pillow.  W/C w/i easy reach at BS.   "

## 2021-09-19 NOTE — PLAN OF CARE
Attended 1 Life Skills Group x 45 minutes with 1 peer. Able to attend to cognitive details with efficiency/spontaneous and accuracy. Socialized appropriately as activity was in progress. Was pleasant and engaged. Futuristic in all conversations.

## 2021-09-19 NOTE — PLAN OF CARE
Problem: Behavioral Health Plan of Care  Goal: Optimized Coping Skills in Response to Life Stressors  Outcome: Improving     Problem: Behavioral Disturbance  Goal: Behavioral Disturbance  Description: Signs and symptoms of listed problems will be absent or manageable by discharge or transition of care.  Outcome: Improving    Patient was in her room start of this shift. Assisted by staff to use the computer for school purposes. Mood calm/quiet/sad. Affect flat and blunted. Patient denies mental health symptoms including SI/SIB/AVH. Bertha endorses frustration of being here and wants to discharge, states she is not  in danger of hurting herself or others. Ate dinner and is med compliant.

## 2021-09-20 PROCEDURE — 124N000002 HC R&B MH UMMC

## 2021-09-20 PROCEDURE — 99232 SBSQ HOSP IP/OBS MODERATE 35: CPT | Mod: GC | Performed by: PSYCHIATRY & NEUROLOGY

## 2021-09-20 PROCEDURE — 250N000013 HC RX MED GY IP 250 OP 250 PS 637

## 2021-09-20 RX ADMIN — RISPERIDONE 2 MG: 2 TABLET, ORALLY DISINTEGRATING ORAL at 21:17

## 2021-09-20 ASSESSMENT — ACTIVITIES OF DAILY LIVING (ADL)
LAUNDRY: WITH SUPERVISION
LAUNDRY: WITH SUPERVISION
DRESS: INDEPENDENT
DRESS: INDEPENDENT
HYGIENE/GROOMING: INDEPENDENT
ORAL_HYGIENE: INDEPENDENT
HYGIENE/GROOMING: INDEPENDENT
ORAL_HYGIENE: INDEPENDENT

## 2021-09-20 NOTE — PROGRESS NOTES
"  ----------------------------------------------------------------------------------------------------------  [facility], [place]   Psychiatric Progress Note  Hospital Day #12     Interim History:   The patient's care was discussed with the treatment team and chart notes were reviewed.    Sleep 7 hours (09/20/21 0600)  Scheduled Medications: Compliant   PRN medications:  No PRN medications     Staff Report:   Weekend Report:   9/18: \"Patient isolates to room much of shift. Comes out for meals. Affect is flat and she appears anxious at times. Is concerned about doing the work for a class she is taking. Requesting if she can use computer to watch lectures. She is agreeable to watch it in small increments at staff convenience. Thoughts are organized.Denies need to be in hospital. Upset because in her new admit folder, an insert states that visitors can bring in food. Denies SI and SIB.\"    9/19: \"Patient was in her room start of this shift. Assisted by staff to use the computer for school purposes. Mood calm/quiet/sad. Affect flat and blunted. Patient denies mental health symptoms including SI/SIB/AVH. Bertha endorses frustration of being here and wants to discharge, states she is not  in danger of hurting herself or others. Ate dinner and is med compliant.\"    Patient Interview:   Tony was interviewed in her room by treatment team. Patient's main concerns are  discharge and \"the reason you are keeping me here.\" When asked about events prior to hospitalization, patient recounts events and comments she is not sure if some things \"are real or not.\" Patient comments she had a past boyfriend that \"monitored\" her cell phone and that is a reason she was \"triggered\" by the sequence of events prior to the hospitalization, like odd text messages, phone calls, conversations with friends. When asked if patient has considered if her recollection is accurate or if it could be a delusion, she " "states, \"I guess it's possible, but there is no way to tell.\" Patient believes her sister is safe at home, although seems unsure, and comments, \"I guess no one is out there.\" She continues to question why she needs to be inpatient for current cares. She states, \"This is me everyday. You want to see me improve. You don't like my personality and that is what is keeping me here.\" Patient previously agreeable to MRI, but now declines due to \"financial strain.\" Mentions will ask SW what her health insurance benefits are. Denies SI, HI. Patient further comments that she is no longer  interested in having a conversation with the treatment team because treatment team is not listening to her.     Patient was taking OCPs prior to hospitalization. Declined continuing the medication at admission. Declines to restart the medication today as she is \"not sexually active since I am in here.\" Patient currently has a boyfriend. Also declined COVID nasal swab, urine hcg(offered since admission)    Per collateral:  Writer spoke with sister via phone call. She mentioned concerns regarding her sister hospital stay and that she considers she does not think she needs to continue inpatient care. When asked about if she considers the events leading to hospitalization justify her hospital stay she says \"yes, but it's been 12 days now and I think she is ok now, I know her, I was with her in the same womb,\" however, she admits not being fully involved with her sister outpatient care. She is interested in a family care meeting with the treatment team either in person or via phone/zoom conference. Writer communicated that will coordinate with the treatment  team and schedule a time. She mentioned she is available during the week any time during the day.      The risks, benefits, alternatives and side effects of any medication changes have been discussed and are understood by the patient and other caregivers.    Review of systems:     ROS was " "negative unless noted above.          Allergies:   No Known Allergies         Psychiatric Examination:   BP 91/64   Pulse 80   Temp (!) 96.7  F (35.9  C)   Resp 16   Wt 59.6 kg (131 lb 4.8 oz)   SpO2 99%   BMI 21.36 kg/m    Weight is 131 lbs 4.8 oz  Body mass index is 21.36 kg/m .    MENTAL STATUS EXAM   Appearance:  No apparent distress, normal posture, well-developed, well-nourished, appears stated age, wearing hospital scrubs  Attitude:  Cooperative, engaged, guarded, agitated and irritable at end of patient interview  Psychomotor:  normal and no evidence of tics, dystonia, or tardive dyskinesia  Eye Contact: appropriate, glances.  Speech:  normal tone and increased normal rate, somewhat hesitant.  Mood: \"fine\"   Affect:  Incongruent, blunted.  Thought Content: denies suicidal ideation, denies homicidal ideation and preoccupations and ruminations regarding discharge from hospital and court commitment process.  Thought Process: goal directed and ruminative  Sensorium: awake and alert  Cognition: memory grossly intact  Impulse control: fair  Insight: poor and improving  Judgment: poor         Labs:   No results found for this or any previous visit (from the past 24 hour(s)).     Assessment  & Plan      Assessment:   Diagnostic Impression:  Tony Gautam is a 30 year old female with a past psychiatric history of psychosis admitted from outside ER on 09/08/2021 due to concern for psychosis in the context of no active medications. Significant symptoms include psychosis and disorganization. Her last psychiatric hospitalization was in 06/2020.  She does not currently have an outpatient provider, was prescribed an selective serotonin reuptake inhibitor by [facility] ED on 9/6, but had not filled the prescription yet. Notes her sister as a primary social support. Patient denies any current substance use of any kind. Cause of current decompensation is unclear, likely that psychosocial stressors may have been " contributory, but patient was unable to relay this information at time of admission.      Her reported symptoms of paranoia, disorganization, and likely AH, periods of depressed mood in the past that are more indicative of negative symptoms of schizophrenia vs schizoaffective disorder vs substance induced psychosis (per chart review and history of substance use, including K2/spice less likely due to negative urine toxycology). By chart review, it appears that her episodes of psychosis may be more severe with each occurrence which may provide stronger rational for a schizophrenia diagnosis. Diagnosis is still in evolution, given patient is poor historian. Optimization of medications to target these symptom clusters in addition to evaluation of adequate outpatient supports will be targets for treatment during this admission.      Today Tony is on hospital day #12.  She continues to be compliant with psychiatric medication (Court commitment with [employer] supported on 9/10/2021) and also cares for her fracture. Patient had interview with [facility] staff 9/15, final hearing scheduled for 9/24. Will monitor case.     Given that she currently has paranoid symptoms, poor insight into her illness, patient warrants continuing  inpatient psychiatric hospitalization to maintain her safety and family members safety. Disposition pending clinical stabilization, medication optimization and development of an appropriate discharge plan.     Risk for harm is high.  Risk factors: maladaptive coping, past behaviors, previous psychiatric hospitalization/ SI, access to fire arms in the household  and psychosis  Protective factors: family      She was medically cleared for admission, being followed by orthopedic surgery while inpatient and admitted to inpatient psychiatric unit. The risks, benefits, alternatives, and side effects of treatments including no treatment have been discussed and are understood by the patient and other  caregivers.        Psychiatric Hospital course:   Bertha presented to the [facility] ED with psychosis including symptoms of paranoia and disorganization. Mental status exam was significant for possibly responding to internal stimuli, disorientation, blunted affect, speech latency, illogical and tangential speech, and disorganization. Given her disorganized behavior/psychotic symptoms, physical injury including fall   Prior to ED consult, patient was placed on an emergency hospitalization 72 hour hold on 8/9/2021 at 4:55 am due to concerns of safety. Olanzapine 10 mg ODT was given at 1128 am   Tony Gautam was admitted to [facility] from [facility] on a 72 hour hold. Patient had no active medications prior to admission, on 9/8  Olanzapine 2.5 mg at bedtime was scheduled, patient declined. On 9/9 court commitment process with [facility] was started. Orthopedic consult placed. Risperidone 1 mg ODT tab was scheduled on 9/9, patient declined. 9/10  Court commitment with [employer] was supported. 9/13  Risperidone increased from 1 mg ODT at bedtime to 2 mg ODT at bedtime. 9/15 per Ortho/Surgery, patient will be continue to be treated non-operatively. 9/19 Patient declined Covid nasal swab test, brain MRI, OCP (offered since admission).        Principal Diagnosis:   # Primary Psychiatric Diagnosis:   # Schizophrenia vs Schizoaffective disorder  - Continue Risperidone 2 mg ODT at bedtime, reassess.  -Cheeking precautions.    -Hydroxyzine 25 mg PO/IM Q4hrs PRN        Secondary psychiatric & Medical diagnoses of concern this admission:   # 1. Comminuted and impacted calcaneal fracture.   -Per Ortho/Surg: Soft splint placement 9/11  - Orthopedic surgery following patient: Non-operative management.  -Tylenol 650 mg PO Q4 hrs PRN for mild-moderate pain  - Ibuprofen 600 mg Q6hrs PRN for moderate pain.        PRN  Medications  - Hydroxyzine 25 mg Q4hrs PRN   - Olanzapine 10 mg PO/IM TID PRN severe agitation/psychosis  -  Trazodone 50 mg PRN at bedtime or Melatonin 3mg po at bedtime PRN    - Acetaminophen 650 mg po Q4hrs PRN pain      Legal Status:   Patient in court hold commitment with [facility].       Safety Assessment:   Behavioral Orders   Procedures    Cheeking Precautions (behavioral units)     Patient Observed swallowing PO medications; Patient asked to drink water after swallowing medication; Patient in Staff line of sight for 15 minutes after medication given; Mouth checks after PO administration (patient asked to open mouth and stick out their tongue).    Code 1 - Restrict to Unit    Elopement precautions    Routine Programming     As clinically indicated    Status 15     Every 15 minutes.       Disposition: Pending  patient stabilization, court commitment process final hearing on 9/24/2021, & development of a safe discharge plan.     Patient will be treated in therapeutic milieu with appropriate individual and group therapies as described.The patient was seen and the plan was discussed with the attending physician, Dr. Lizeth Barahona M.D.      Hannah Ocasio, MS3  [employer]     Attestation:   I was present with the medical student who participated in the service and in the documentation of the note. I have verified the history and personally performed the physical exam and medical decision making. I agree with the assessment and plan of care as documented in the note.    Mayco Frazier MD  PGY-1 Psychiatry  Pager:  [phone]    Attestation:  This patient has been seen and evaluated by me, Lizeth Barahona MD.  I have discussed this patient with the psychiatry resident and I agree with the findings and plan in this note.    I have reviewed today's vital signs, medications, labs and imaging. Lizeth Barahona MD

## 2021-09-20 NOTE — PLAN OF CARE
Assessment/Intervention/Current Symptoms and Care Coordination  - chart review  - team meeting  - individual meeting with patient   She asked to see print out of why they (doctor) are recommending she still be here beyond 12 days - I asked if she meant court orders, she said yes and this writer clarified that there are no further orders beyond what she has. Did say that recommendation are discussed with her with the doctors when they see her and are entered in notes. She did not ask to see medical records at this point,. Says they told her they wanted her to have an out pt plan and that she has had appointments scheduled but missed them. Was referred when was seen in ED day prior to this admission. This writer discussed that I will inquire with the company that does the DEC assessments as to if they can share what/where she was referred to. Also discussed that we can work on referral to other providers if the ones she was referred to prior are no longer an option.    Also said to Tony that wanted to be clear it is not just that out pt is schedule and also that the doctors have assessed/reccomend that is clinically appropriate for discharge. Informed her that if I am told by [facility] they cannot share the information with me I will get a number for her to call.  Also discussed with her recommendation to continue with a  in the community through the [facility] which she said yes she would like to do that.     - patient's  called to ask for this writer's e-mail to send examiner report when completed to provider to patient - informed her that it is best to send to the unit fax number and provided this to her. Also informed her that it is common for 's to send faxes to patient's on the unit and she can do so as well and address to patient .     - call to patient's sister Alicia [phone] - on answer - vague message with number only listed - this writer did leave a message stating calling /leaving  message for Alicia - and if Alicia to call back - left unit number and my name. Did not include patient name in message as no identifier on Alicia's voicemail.     - Provided patient information re providers she had been scheduled with (through [facility] assistance)  in a print out to patient and discussed with her that can reschedule with them or can help find other providers. Also discussed that will need to verify that appointments are actually scheduled. Discussed that we will talk more tomorrow about potential time line for scheduling.         Discharge Plan or Goal  Pending stabilization & development of a safe discharge plan.  Considerations include: assessment ongoing       Barriers to Discharge   Patient requires further psychiatric stabilization due to current symptomology      Referral Status     Spoke with staff at [facility] learned that patient had been scheduled with the following during an ED visit on 9/6/21 - had missed both appointments as was admitted later on 9/8/2021    Medication Management - [facility] ph: [phone]   Was scheduled with -  Paradise Craias NP    Individual Therapy - [facility] ph: [phone] fax: [phone]  Was scheduled with -  Bertha BUENO       Legal Status   Patient is on a court hold in [facility]     Preliminary Hearing Probable Cause Hearing held 9/16/2021 Friday September 24, 2021 - 11:00 am Final Commitment Hearing     Order from preliminary Hearing and Notice of Remote Zoom Hearing (final hearing schedule) received on unit    Per Order - (Please refer to copy of order for exact language as this writer is charting in best effort to convey language of order)  - A Judicial Commitment Hearing will be conducted virtually via Zoom September 24, 2021 at 11:00am    - Until such hearing the Respondent be involuntarily held at [facility]. Respondent may be released prior to the final Judicial Commitment hearing if her treating  physician at [facility] deems her sufficiently stable for discharge.    Copies of order placed in paper chart and placed for H.I.M scan into media of EHR    Per internal process this writer sent message to behavioral administration staff regarding need to schedule ITV hearing

## 2021-09-20 NOTE — PLAN OF CARE
Problem: Behavioral Health Plan of Care  Goal: Optimized Coping Skills in Response to Life Stressors  Outcome: No Change  Goal: Develops/Participates in Therapeutic [place] to Support Successful Transition  Outcome: No Change     Pt visible in the milieu for the majority of the day. Pt is guarded with staff but is selectively social. She did not attend groups today. Pt declined the following labs and/or diagnostic tests ordered by her provider: MRI, COVID nasal swab, urine drug screen, urine hcg.  Pt denies SI/SIB, as well as AH/VH. No additional concerns at this time. Will continue to assess and offer support.

## 2021-09-20 NOTE — PLAN OF CARE
Problem: Behavior Regulation Impairment (Psychotic Signs/Symptoms)  Goal: Improved Behavioral Control (Psychotic Signs/Symptoms)  Outcome: Improving     Problem: Mood Impairment (Psychotic Signs/Symptoms)  Goal: Improved Mood Symptoms (Psychotic Signs/Symptoms)  Outcome: Improving    Patient visible in the milieu sitting in the lounge at start if shift. Offered a snack, drank some apple juice.  Patient is socially withdrawn, keeps to herself but appropriate  when addressed. States there is someone who is coming tomorrow to assess her and see if she stills needs to be here as she (patient) believes she does not have a mental illness. Patient declined to give urine specimen for UTOX and HCG as she had her periods recently and was not on drugs before admission. Declined MRI because of the cost and also declines COVID test.  MRI updated, they have cancelled the order, update them if patient changes her mind. Tony denies SI/SIB/AVH/Depression/Anxiety.  Patient took a nap before supper, did not attend group.

## 2021-09-20 NOTE — PLAN OF CARE
"  Problem: Sleep Disturbance  Goal: Adequate Sleep/Rest  Outcome: No Change     Problem: Fracture Stabilization and Management (Orthopaedic Fracture)  Goal: Fracture Stability  Outcome: No Change     Problem: Pain (Orthopaedic Fracture)  Goal: Acceptable Pain Control  Outcome: No Change   Observed to have slept well for approx 7 hrs on all Q 15\" rounds w/ regular non-labored respirations and no reported or observed distress.  Left foot with Montoya Splint and Ace Wrap in tact and kept elevated on pillow.  W/c w/I easy reach at BS for mobility.  Safe, therapeutic environment maintained.   "

## 2021-09-21 VITALS
DIASTOLIC BLOOD PRESSURE: 76 MMHG | WEIGHT: 131.3 LBS | SYSTOLIC BLOOD PRESSURE: 119 MMHG | RESPIRATION RATE: 16 BRPM | BODY MASS INDEX: 21.36 KG/M2 | OXYGEN SATURATION: 100 % | HEART RATE: 84 BPM | TEMPERATURE: 98.5 F

## 2021-09-21 PROCEDURE — 99239 HOSP IP/OBS DSCHRG MGMT >30: CPT | Mod: GC | Performed by: PSYCHIATRY & NEUROLOGY

## 2021-09-21 RX ORDER — ACETAMINOPHEN 325 MG/1
650 TABLET ORAL EVERY 8 HOURS PRN
Qty: 90 TABLET | Refills: 0 | Status: SHIPPED | OUTPATIENT
Start: 2021-09-21 | End: 2021-10-21

## 2021-09-21 RX ORDER — RISPERIDONE 2 MG/1
2 TABLET, ORALLY DISINTEGRATING ORAL AT BEDTIME
Qty: 30 TABLET | Refills: 0 | Status: SHIPPED | OUTPATIENT
Start: 2021-09-21 | End: 2021-10-21

## 2021-09-21 ASSESSMENT — ACTIVITIES OF DAILY LIVING (ADL)
DRESS: SCRUBS (BEHAVIORAL HEALTH);INDEPENDENT
HYGIENE/GROOMING: INDEPENDENT
ORAL_HYGIENE: INDEPENDENT

## 2021-09-21 NOTE — DISCHARGE INSTRUCTIONS
Behavioral Discharge Planning and Instructions    Summary: You were admitted on 9/8/2021 due to Psychotic Symptomology.  You were treated by Dr. Chip Phillips and Dr. Barahona and discharged on 9/21/21 from [facility] Home     While hospitalized a petition for commitment (MI) with [facility] request was filed through [facility] and supported - they also stipulated that you could be discharged prior to the final hearing if the treating physician at [facility] deems her sufficiently stable for discharge. You will discharge to home with family and will follow up with [facility] including your final hearing on Friday 9/24/2021.     Main Diagnosis: Schizophrenia     Health Care Follow-up:       Tuesday September 28, 2021 - 11:00am - therapist - Scarlet Massey  - virtual appointment   [facility] [address] [place] [address] [address] [facility] [address] ph: [phone] fax: [phone]      Monday October 4, 2021 - 10:00am  Psychiatric Medication Management Paradise Carias NP -  In Person Appointment   [address] [place] [address]  Phone: [phone] Fax: [phone]    [facility]    Vickie Gibbs ph: [phone] fax: [phone]  Continue working with Vickie for access to resources and to allow for monitoring of follow up of recommended care and the civil commitment process.       Orthopedic Follow Up  Call to make an appointment on 9/29/21 with Orthopedic, Dr. Alexander at [phone] for follow up of left calcaneus fracture.  Non weight bearing left foot.    You also expressed interested in having your Orthopedic Care through [place] [facility] - if that is your preference contact them immediately to discuss obtaining your records from [address] [place] [address] Phone: [phone]    Attend all scheduled appointments with your outpatient providers. Call at least 24 hours in advance if you need to  "reschedule an appointment to ensure continued access to your outpatient providers.     Major Treatments, Procedures and Findings:  You were provided with: a psychiatric assessment, assessed for medical stability, medication evaluation and/or management, group therapy, milieu management, medical interventions and orthopedic consultation     Symptoms to Report: feeling more aggressive, increased confusion, losing more sleep, mood getting worse or thoughts of suicide    Early warning signs can include: increased depression or anxiety sleep disturbances increased thoughts or behaviors of suicide or self-harm  increased unusual thinking, such as paranoia or hearing voices    Safety and Wellness:  Take all medicines as directed.  Make no changes unless your doctor suggests them.      Follow treatment recommendations.  Refrain from alcohol and non-prescribed drugs.     Resources:   Crisis Intervention: 898.962.3262 or 787-009-5030 (TTY: 677.832.8976).  Call anytime for help.  National [place] on Mental Illness (www.[place].trace.org): 193.910.5301 or 120-181-6103.  Suicide Awareness Voices of Education (SAVE) (www.save.org): 018-945-SAVE (7283)  Mental Health Consumer/Survivor Network of [place] (www.mhcsn.net): 702.823.2749 or 823-726-3500  Mental Health Association of [place] (www.mentalhealth.org): 963.609.2000 or 214-699-9678  Text 4 Life: txt \"LIFE\" to 03712 for immediate support and crisis intervention  Crisis text line: Text \"[place]\" to 181551. Free, confidential, 24/7.  [facility] Mobile Crisis Response Team (CRT):  [phone] or [phone]     General Medication Instructions:   See your medication sheet(s) for instructions.   Take all medicines as directed.  Make no changes unless your doctor suggests them.   Go to all your doctor visits.  Be sure to have all your required lab tests. This way, your medicines can be refilled on time.  Do not use any drugs not prescribed by your " doctor.  Avoid alcohol.    Advance Directives:   Scanned document on file with [employer]? No scanned doc  Is document scanned? Pt states no documents  Honoring Choices Your Rights Handout: Informed and given  Was more information offered? Materials given    The Treatment team has appreciated the opportunity to work with you. If you have any questions or concerns about your recent admission, you can contact the unit which can receive your call 24 hours a day, 7 days a week. They will be able to get in touch with a Provider if needed. The unit number is [phone] .

## 2021-09-21 NOTE — PLAN OF CARE
Patient discharge instructions completed, Suicide risk assessment completed, patient denies SI/SIB. Encouraged to attend final court hearing on 9/24/21 without fail via zoom as patient is on court hold. Educated on follow up appointments including orthopedic appointments. Educated on medication management and to follow directions as indicated and also educated on  when to seek help if has increased mental symptoms or suicidal thoughts. Patient states understanding.  Patient was given clutches, those have been adjusted for her use. Understands she needs to follow up with orthopedics for her (L) ankle and make appointments. Discharged with all her belongings. Patient left at 1865 and was picked up by a friend as her sister was delayed. No concerns at discharge.

## 2021-09-21 NOTE — PLAN OF CARE
"  Problem: Sleep Disturbance  Goal: Adequate Sleep/Rest  Outcome: No Change   Observed to have slept for approx 7 hrs on all Q 15 \" rounds overnight w/ regular non-labored respirations and no reported or observed distress.  Jan splint w/ Ace Wrap in tact to Left Ft. W/ Ft elevated on pillow .  Wheelchair w/I easy reach at BS for mobility.  Safe, therapeutic environment maintained.   "

## 2021-09-21 NOTE — PLAN OF CARE
"  Problem: Behavioral Disturbance  Goal: Behavioral Disturbance  Description: Signs and symptoms of listed problems will be absent or manageable by discharge or transition of care.  Outcome: Improving   \"I don't feel I need to be here, I just want to go home.\" Denies depression and anxiety. States thoughts are clear and is able to focus well. Attends a few groups, states is social with peers. Denies pain. CMS in toes of left foot is good. States does not attend many groups because does not feel she needs to be here. Is waiting for examiner to see her today.   "

## 2021-09-21 NOTE — DISCHARGE SUMMARY
"    Psychiatric Discharge Summary    Hospital Day #13    Tony Gautam MRN# [id number]   Age: 31 year old YOB: 1990     Date of Admission:  9/8/2021  Date of Discharge:  9/21/2021  Admitting Physician:  Chip Phillips MD  Discharge Physician:  Lizeth Barahona M.D.         Event Leading to Hospitalization:   \"Tony Gautam is a 30 y.o. female with a past history of psychosis who presents to the emergency department for evaluation of a mental health problem. Earlier tonight at 0100 police were called by the patient's sister. They then left. The patient's sister called police again later in the night because the patient had grabbed knives. When Police arrived the patient tried to run from them, and then fell and injured her left ankle and left pinky. The patient says she couldn't really walk on her left ankle after she injured it. EMS then arrived and transported her to the emergency department. The patient was calm for EMS. In the emergency department the patient says she wants to know her sister is safe. The patient felt scared for her sister tonight.The patient says she genuinely thinks that someone had grabbed her sister even though she knows nobody was there. She says she felt like someone was in her house tonight. The patient's sister was scared of her tonight because her sister didn't know why the patient was behaving a certain way. The patient tries to be calm for her sister. The patient tried to grab a knife which worried her sister. The patient was grabbing the knife to hold it and then set it down. The patient says her sister was scared of her and was amping her up earlier in the night. The patient has not been taking her medication for \"a while\". She was prescribed the medication a couple of days ago but has not picked it up yet. The patient says her family was concerned for her and for her to take her medications. The patient says she had no thoughts of harming " "herself recently\"       See Admission note by Dr. Chip Phillips MD on 9/08/2021 for additional details.     Per patient interview today:  \"Tony was interviewed in her room by treatment team. Patient worked with Jaye, yesterday 9/20 to schedule outpatient appointments for follow up. She was able to schedule a medication management appointment 9/29 and a therapy appointment 10/4. Patient states, \" I have always been ok with doing outpatient appointments, just don't want to do them here.\" Patient has discussed future discharge planning with her sister who has agreed to be more involved in her patient's care and can be with the patient \"not all the time, but most of the time.\" Patient states, \"I do realize the severity of my actions,\" when asked about reason for hospitalization. Patient's mood is unchanged, \"fine.\" She continues to be \"mad that I am in here.\" Discussed scheduling family conference call for later today, 9/21 at 1:00 PM. Patient is agreeable.\"    Per family conference call.  Participants: Patient, treatment team, Jaye-social work, Alicia-sister, Rogelio-father.   Discussed all members on the call, as above. Discussed \"big picture\" of mental health illness and court process- Tony had a mental health emergency that brought her into the hospital. Originally, patient's insight into her illness was poor. Because of this, continued with mental health court process. At time of discharge Tony was able to more flexibly talking about care planning, family acknowledging seriousness of illness, risks and prognosis of illness, family responsibilities/support, appropriate medical care.     Discussed current active diagnosis of \"psychotic illness\" as work up is not complete without MRI that Tony has currently declined imaging study. If the MRI is negative/normal, diagnosis is likely a schizophrenia picture. Tony mentions she does not want to get the MRI until she is not working to " have money to pay for MRI imaging, Everyone on call is agreeable to plan. Alicia inquired about appointments and medications after discharge. All questions answered.      Update: Later patient found out insurance company covering only 30% of the cost, she declined MRI at this time and will coordinate with outpatient provider to get the study done in the future due to current financial strain.    The risks, benefits, alternatives and side effects of any medication changes have been discussed and are understood by the patient and other caregivers. Duration: 50 minutes.           Diagnoses:   #Primary Psychiatric Diagnosis  #Psychotic Illness, possibly Schizophrenia if not other cause of symptoms is identified    #Secondary Psychiatric Diagnosis  None    Diagnostic Impression:   Tony Gautam is a 30 year old female with a past psychiatric history of psychosis admitted from outside ER on 09/08/2021 due to concern for psychosis in the context of no active medications. Significant symptoms included psychosis and disorganization. Her last psychiatric hospitalization was in 06/2020.  She does not currently have an outpatient provider, was prescribed an selective serotonin reuptake inhibitor by [facility] ED on 9/6, but had not filled the prescription yet. Notes her sister as a primary social support. Patient denies any current substance use of any kind. Cause of current decompensation is unclear, likely that psychosocial stressors may have been contributory, but patient was unable to relay this information at time of admission.      Her reported symptoms of paranoia, disorganization, and likely AH, periods of depressed mood in the past that are more indicative of negative symptoms of schizophrenia vs schizoaffective disorder vs substance induced psychosis (per chart review and history of substance use, including K2/spice less likely due to negative urine toxycology). By chart review, it appears that her episodes of  psychosis may be more severe with each occurrence which may provide stronger rational for a schizophrenia diagnosis. Diagnosis is still in evolution, given patient still needs Brain MRI to rule out other causes of psychosis. Optimization of medications to target these symptom clusters in addition to evaluation of adequate outpatient supports were targets for treatment during this admission.      Today Tony is on hospital day #13.  She continues to be compliant with psychiatric medication (Court commitment with [employer] supported on 9/10/2021) and also cares for her fracture. Patient had interview with [facility] staff 9/15, final hearing scheduled for 9/24. Patient and family are aware.          Consults:   Consult orthopedic surgery for comminuted and impacted calcaneal fracture.   - Soft splint placed 9/11  - Non-operative management, follow up ~2 weeks (9/29) as outpatient Orthopedic clinic [place] in Dr. Alexander's clinic [facility], however, Patient prefers follow -up at [facility] for orthopedics, Patient will schedule appointment. Contact information provided.           Hospital Course:   Psychiatric Course:  Tony Gautam was admitted to [facility] with attending Lizeth Barahona MD on a 72 hour mental health hold and under an ongoing civil commitment.   Bertha presented to the [facility] [place] ED with psychosis including symptoms of paranoia and disorganization. Mental status exam was significant for possibly responding to internal stimuli, disorientation, blunted affect, speech latency, illogical and tangential speech, and disorganization. Given her disorganized behavior/psychotic symptoms, physical injury including fall   Prior to ED consult, patient was placed on an emergency hospitalization 72 hour hold on 9/8/2021 at 4:55 am due to concerns of safety. Olanzapine 10 mg ODT was given at 1128 am   Tony Gautam was admitted to [facility] from [facility]  ED on a 72 hour hold. Patient had no active medications prior to admission, on 9/8  Olanzapine 2.5 mg at bedtime was scheduled, patient declined. On 9/9 court commitment process with [employer] was started. Orthopedic consult placed. Risperidone 1 mg ODT tab was scheduled on 9/9, patient declined. 9/10  Court commitment with [employer] was supported. 9/13  Risperidone increased from 1 mg ODT at bedtime to 2 mg ODT at bedtime. 9/15 per Ortho/Surgery, patient will be continue to be treated non-operatively. 9/20 Patient declined Covid nasal swab test, brain MRI, OCP (offered since admission). 9/21 family care conference. 9/21 discharge plan was solidified with family members and decision was made to discharge patient home under care of sister Alicia who is responsible to coordinate patient outpatient care.     Medical Course:  Consult orthopedic surgery for comminuted and impacted calcaneal fracture. Patient initially denied all treatment recommendation.  - Soft splint placed 9/11  - Non-operative management, follow up ~2 weeks (9/29) as outpatient Orthopedic clinic [place] in Dr. Alexander's clinic [facility], however, Patient prefers follow -up at [facility] for orthopedics, Patient/family will schedule appointment. Contact information provided.      Risk Assessment:      Today Tony Gautam deniesSI, SIB and HI. No overt evidence of psychosis or gee observed. Patient grossly appears to be cognitively intact. Insight and judgement have improved since admission.  Patient is aware of consequences of her mental illness/symptoms not being treated. Patient expresses understanding of her diagnosis and willingness to work with her sister Alicia to follow outpatient care and in the future scheduling a brain MRI, ideally, to rule out other causes such as brain tumors/lession causing her symptmos. Patient has not exhibited aggressive or violence behaviors since prior to this  admission. Throughout patient's stay they were calm and cooperative, irritable at times which has improved.  she has notable risk factors for self-harm, including age, access to firearm, single status, psychosis and substance abuse in the past, per chat review.. However, risk is mitigated by commitment to family, ability to volunteer a safety plan and history of seeking help when needed. Patient does  have access to firearms in the household, however, sister reported is safely stored now. Therefore, based on all available evidence including the factors cited above, she does not appear to be at imminent risk for self-harm, does not meet criteria for a 72-hr hold, and therefore remains appropriate for ongoing outpatient level of care. Please note patient is currently on a court hold for civil commitment with [employer], which was supported on 9/13/2021, final hearing scheduled for 9/24/2021. Patient was evaluated by treatment team today and discharge plan put in place alongside family members during multidisciplinary team and family members and patient so patient can continue civil commitment process from home. Patient provided with print out of specific and detailed information. Patient agreed to further reduce risk of self-harm by agreeing to remain medication adherent and work with sister regarding follow up appointments. Additional steps taken to minimize risk include: medication optimization, close psychiatric follow up and provision of crisis resources.  Patient expressed understanding of risk associated with her mental illness including increased risk of harm to self or others.     Tony was released to home with her sister, Alicia. During this admission, she did participate in groups and was visible in the milieu, and her symptoms of psychosis/paranoia improved. At the time of discharge she was determined to not be a danger to herself or others.     This document serves as a transfer of care to Tony Gautam's  "outpatient providers.         Discharge Medications:     Current Discharge Medication List        START taking these medications    Details   acetaminophen (TYLENOL) 325 MG tablet Take 2 tablets (650 mg) by mouth every 8 hours as needed for mild pain (for moderate ankle pain)  Qty: 90 tablet, Refills: 0    Associated Diagnoses: Closed nondisplaced fracture of left calcaneus with routine healing, unspecified portion of calcaneus, subsequent encounter      risperiDONE (RISPERDAL M-TABS) 2 MG ODT Take 1 tablet (2 mg) by mouth At Bedtime  Qty: 30 tablet, Refills: 0    Comments: May be substituted with non-ODT  tablet  Associated Diagnoses: Paranoid schizophrenia (H)           CONTINUE these medications which have NOT CHANGED    Details   levonorgestrel-ethinyl estradiol (AVIANE) 0.1-20 MG-MCG tablet Take 1 tablet by mouth daily                  Psychiatric Examination:   Appearance:  no apparent distress, normal posture and appears stated age  Attitude:  cooperative and engaged, less irritatable than previous days   Psychomotor:  normal and no evidence of tics, dystonia, or tardive dyskinesia  Eye Contact: appropriate  Speech:  normal tone and normal rate  Mood: \"fine\"  Affect:  congruent with mood  Thought Content: denies suicidal ideation and denies homicidal ideation  Thought Process: coherent and goal directed, more executive function thought processes   Sensorium: awake and alert  Cognition: memory grossly intact  Impulse control: fair  Insight: fair and improving  Judgment: fair, improving         Discharge Plan:       While hospitalized a petition for commitment (MI) with [employer] request was filed through [facility] and supported - they also stipulated that you could be discharged prior to the final hearing if the treating physician at [facility] deems her sufficiently stable for discharge. You will discharge to home with family and will follow up with [facility] including your final hearing on Friday " 2021.     Mental health:  * 2021 - 11:00am - therapist - Scarlet Massey  - virtual appointment   [facility] [address] [place] [address] ph: [phone] fax: [phone]    * 2021 - 10:00am  Psychiatric Medication Management [place] Carias , DOREEN -  In Person Appointment   [facility] [address] [place] [id number]  Phone: [phone] Fax: [phone]    *Outpatient Brain MRI through outpatient psychiatric care is strongly recommended. Patient declined at this time in the inpatient unit due to financial strain/insurance coverage.         Other referrals:   Orthopedic Follow Up  Call to make an appointment on 21 with Orthopedic, Dr. Alexander at [phone] for follow up of left calcaneus fracture.  Non weight bearing left foot.    Patient expressed preference of continuing outpatient Orthopedic Care through [address] [place] [address] Phone: [phone]    Consider outpatient Physical Therapy care, can be managed through Orthopedic follow-up.  Crutches issued to patient.     Pt seen and discussed with my attending physician, Dr. Lizeth Barahona M.D.     Mayco Frazier MD  PGY-1 Psychiatry Resident    Attestation:   The patient has been seen and evaluated by me,  Lizeth Barahona MD. I have examined the patient today and reviewed the discharge plan with the resident and medical student. I agree with the final assessment and plan, as noted in the discharge summary. I have reviewed today's vital signs, medications, labs and imaging.  Total time discharge plannin minutes  Lizeth Barahona MD             Appendix A: All Labs This Admission:     Results for orders placed or performed during the hospital encounter of 21   XR Calcaneus Left G/E 2 Views     Status: None    Narrative    EXAM: XR ANKLE LEFT G/E 3 VIEWS, XR CALCANEUS LEFT G/E 2 VIEWS,  XR  FOOT LEFT G/E 3 VIEWS  9/9/2021 12:33 PM      HISTORY: left foot and ankle pain    COMPARISON: None    FINDINGS: 3 nonweightbearing views of the left ankle. 3  nonweightbearing views left foot. Axial view of the calcaneus.    Comminuted calcaneal fracture with collapse of Boehler's angle and  intra-articular fracture extension into the subtalar joint (definitely  posterior subtalar, possibly middle subtalar. Possible calcaneocuboid  fracture extension. Diffuse soft tissue swelling. Ankle mortise and  syndesmosis appear congruent on this nonweight bearing study. Lisfranc  joint appears congruent on this nonweightbearing study. No substantial  degenerative change.       Impression    IMPRESSION: Highly comminuted calcaneal fracture with intra-articular  extension. Collapse of Boehler's angle. Consider CT. If calcaneal  fracture is from a fall from height, thoracic and lumbar radiographs  are generally recommended to evaluate for compression fracture.         KAREN FRANCO MD (Joe)         SYSTEM ID:  [id number]   XR Ankle Left G/E 3 Views     Status: None    Narrative    EXAM: XR ANKLE LEFT G/E 3 VIEWS, XR CALCANEUS LEFT G/E 2 VIEWS, XR  FOOT LEFT G/E 3 VIEWS  9/9/2021 12:33 PM      HISTORY: left foot and ankle pain    COMPARISON: None    FINDINGS: 3 nonweightbearing views of the left ankle. 3  nonweightbearing views left foot. Axial view of the calcaneus.    Comminuted calcaneal fracture with collapse of Boehler's angle and  intra-articular fracture extension into the subtalar joint (definitely  posterior subtalar, possibly middle subtalar. Possible calcaneocuboid  fracture extension. Diffuse soft tissue swelling. Ankle mortise and  syndesmosis appear congruent on this nonweight bearing study. Lisfranc  joint appears congruent on this nonweightbearing study. No substantial  degenerative change.       Impression    IMPRESSION: Highly comminuted calcaneal fracture with intra-articular  extension. Collapse of  Boehler's angle. Consider CT. If calcaneal  fracture is from a fall from height, thoracic and lumbar radiographs  are generally recommended to evaluate for compression fracture.         KAREN FRANCO MD (Joe)         SYSTEM ID:  [id number]   XR Foot Left G/E 3 Views     Status: None    Narrative    EXAM: XR ANKLE LEFT G/E 3 VIEWS, XR CALCANEUS LEFT G/E 2 VIEWS, XR  FOOT LEFT G/E 3 VIEWS  9/9/2021 12:33 PM      HISTORY: left foot and ankle pain    COMPARISON: None    FINDINGS: 3 nonweightbearing views of the left ankle. 3  nonweightbearing views left foot. Axial view of the calcaneus.    Comminuted calcaneal fracture with collapse of Boehler's angle and  intra-articular fracture extension into the subtalar joint (definitely  posterior subtalar, possibly middle subtalar. Possible calcaneocuboid  fracture extension. Diffuse soft tissue swelling. Ankle mortise and  syndesmosis appear congruent on this nonweight bearing study. Lisfranc  joint appears congruent on this nonweightbearing study. No substantial  degenerative change.       Impression    IMPRESSION: Highly comminuted calcaneal fracture with intra-articular  extension. Collapse of Boehler's angle. Consider CT. If calcaneal  fracture is from a fall from height, thoracic and lumbar radiographs  are generally recommended to evaluate for compression fracture.         KAREN FRANCO MD (Joe)         SYSTEM ID:  [id number]   CT Foot Left w/o Contrast     Status: None    Narrative    EXAM: CT FOOT LEFT W/O CONTRAST  9/10/2021 9:16 AM      HISTORY: Fracture, foot    COMPARISON: Radiograph's prior day    TECHNIQUE: CT imaging of the left foot without IV contrast.  Multiplanar reconstructions.    FINDINGS:      images: Redemonstration of comminuted calcaneal fracture, better  detailed on CT images.    Highly comminuted calcaneal fracture. Intra-articular extension of the  medial aspect of the posterior subtalar joint (series 7 image 74)  without significant  depression. Coronally oriented fracture line abuts  the posterior margin of the posterior subtalar joints from medial to  lateral. Nondisplaced fracture line extending into the calcaneocuboid  joint (series 5 image 85). Fracture morphology most consistent with  Aguirre 2c. Partial collapse of Boehler's angle. Large Central  calcaneal fracture fragments with mild displacement.    Remainder of left foot unremarkable without significant degenerative  change.    Diffuse soft tissue swelling.      Impression    IMPRESSION: Highly comminuted calcaneal fracture with intra-articular  extension involving the medial aspect of the posterior subtalar joint  (Aguirre 2c).     KAREN FRANCO MD (Joe)         SYSTEM ID:  [id number]   CBC with platelets differential     Status: None    Narrative    The following orders were created for panel order CBC with platelets differential.  Procedure                               Abnormality         Status                     ---------                               -----------         ------                     CBC with platelets and d...[[id number]]                      Final result                 Please view results for these tests on the individual orders.   Comprehensive metabolic panel     Status: Abnormal   Result Value Ref Range    Sodium 139 133 - 144 mmol/L    Potassium 3.8 3.4 - 5.3 mmol/L    Chloride 107 94 - 109 mmol/L    Carbon Dioxide (CO2) 30 20 - 32 mmol/L    Anion Gap 2 (L) 3 - 14 mmol/L    Urea Nitrogen 12 7 - 30 mg/dL    Creatinine 0.90 0.52 - 1.04 mg/dL    Calcium 8.6 8.5 - 10.1 mg/dL    Glucose 96 70 - 99 mg/dL    Alkaline Phosphatase 40 40 - 150 U/L    AST 17 0 - 45 U/L    ALT 17 0 - 50 U/L    Protein Total 6.8 6.8 - 8.8 g/dL    Albumin 3.4 3.4 - 5.0 g/dL    Bilirubin Total 0.9 0.2 - 1.3 mg/dL    GFR Estimate 87 >60 mL/min/1.73m2   TSH with free T4 reflex and/or T3 as indicated     Status: Normal   Result Value Ref Range    TSH 1.18 0.40 - 4.00 mU/L   Vitamin B12      Status: Normal   Result Value Ref Range    Vitamin B12 453 193 - 986 pg/mL   Folate     Status: Normal   Result Value Ref Range    Folic Acid 11.7 >=5.4 ng/mL   CBC with platelets and differential     Status: None   Result Value Ref Range    WBC Count 5.8 4.0 - 11.0 10e3/uL    RBC Count 4.28 3.80 - 5.20 10e6/uL    Hemoglobin 12.3 11.7 - 15.7 g/dL    Hematocrit 35.7 35.0 - 47.0 %    MCV 83 78 - 100 fL    MCH 28.7 26.5 - 33.0 pg    MCHC 34.5 31.5 - 36.5 g/dL    RDW 11.9 10.0 - 15.0 %    Platelet Count 227 150 - 450 10e3/uL    % Neutrophils 60 %    % Lymphocytes 28 %    % Monocytes 10 %    % Eosinophils 1 %    % Basophils 1 %    % Immature Granulocytes 0 %    NRBCs per 100 WBC 0 <1 /100    Absolute Neutrophils 3.5 1.6 - 8.3 10e3/uL    Absolute Lymphocytes 1.6 0.8 - 5.3 10e3/uL    Absolute Monocytes 0.6 0.0 - 1.3 10e3/uL    Absolute Eosinophils 0.1 0.0 - 0.7 10e3/uL    Absolute Basophils 0.0 0.0 - 0.2 10e3/uL    Absolute Immature Granulocytes 0.0 <=0.0 10e3/uL    Absolute NRBCs 0.0 10e3/uL   Orthopaedic Surgery Adult/Peds IP Consult: Patient to be seen: Routine within 24 hrs; Call back #: [phone] or [phone]; Left foot Comminuted and impacted calcaneal fracture per xray; Consultant may enter orders: Yes; Requesting provider? ...     Status: None ()    Ubaldo Hudson MD     9/9/2021  5:39 PM  [facility] Orthopedic Surgery Consultation    Tony Gautam MRN# [id number]   Age: 30 year old YOB: 1990   Date of Admission: 9/8/2021    Reason for consult: Left calcaneus fracture   Requesting physician: Chip Macedo*   Level of consult: One-time consult to assist in determining a   diagnosis, recommend an appropriate treatment plan and place   orders          Assessment and Plan:   Assessment:  Tony Gautam is a 30 year old female with PMH significant for   psychosis currently admitted to the mental health unit who   orthopedic surgery was consulted on regarding a closed left    calcaneus fracture.    The patient has a significant calcaneus fracture that closed but   comminuted with intra-articular involvement.  Standard of care   treatment for this injury at this stage would be a CT scan to   further evaluate the fracture pattern as well as placement of a   bulky Mykel Montoya splint with close clinical follow-up for   possible operative fixation.  This was reviewed with the patient.    She was adamant against any further treatment for her injury   despite thoroughly discussing the above recommendations.  On   multiple attempts, I tried to explain the rationale behind at   least applying the splint today and she could hold off on the CT   scan until a later date.  However, again she was adamant that she   did not want anything done and that she would follow-up with her   regular doctor on an outpatient basis.  I explained the need for   orthopedics in this case.  Talking with nursing, she has been   acutely psychotic since admission to the mental health unit with   no real insight into her situation.  Discussed the risk of not   treating this injury would be continued pain, possible skin   breakdown and infection, worsening of the fracture, posttraumatic   arthritis, and inability to weight-bear.  She expressed an   understanding of these risks and further denied treatment.    Orthopedic surgery will remain available if she changes her mind.    Please keep splint material on the unit until she discharges in   case this happens.     Plan:  - LYNDSAY ROSALES   - Recommended splint application but patient repeatedly denied  - Recommended CT scan of the calcaneus but patient repeatedly   denied  - Recommended we schedule outpatient follow up with our   orthopedic surgery group but the patient repeatedly denied  - Ortho to remain available should the patient change her mind in   regard to any of the above recommendations    Discussed with orthopedic surgery staff Dr. Alexander.    --  Ubaldo Esparza,  MD  Orthopedic Surgery PGY-1  [id number]         History of Present Illness:   Tony Gautam is a 30 year old female with an extensive   psychiatric history currently in the mental health unit who   orthopedic surgery was consulted on regarding a left calcaneus   fracture. Patient reports that she was running after her sister   when she fell down a set a stairs. She reports being unable to   bear weight on her left foot or walk after this incident. Per   chart review she was apprehended by the police as her sister was   concerned for her safety and the patient's. She was initially   evaluated at an outside ED where the calcaneus fracture was   identified and she was placed in a postop shoe. She denies having   much pain in the ankle unless she tries to move it too much or   walk on it. She denies prior history of injuries or surgeries to   this extremity. No numbness.     A 10 point review of systems was otherwise negative other than as   noted in history above.         Past Medical History:     Past Medical History:   Diagnosis Date     Depressive disorder           Past Surgical History:   No past surgical history on file.         Social History:     Social History     Socioeconomic History     Marital status: Single     Spouse name: Not on file     Number of children: Not on file     Years of education: Not on file     Highest education level: Not on file   Occupational History     Not on file   Tobacco Use     Smoking status: Never Smoker     Smokeless tobacco: Never Used   Substance and Sexual Activity     Alcohol use: Yes     Drug use: Never     Sexual activity: Not on file   Other Topics Concern     Not on file   Social History Narrative     Not on file     Social Determinants of Health     Financial Resource Strain:      Difficulty of Paying Living Expenses:    Food Insecurity:      Worried About Running Out of Food in the Last Year:      Ran Out of Food in the Last Year:    Transportation Needs:      Lack of  Transportation (Medical):      Lack of Transportation (Non-Medical):    Physical Activity:      Days of Exercise per Week:      Minutes of Exercise per Session:    Stress:      Feeling of Stress :    Social Connections:      Frequency of Communication with Friends and Family:      Frequency of Social Gatherings with Friends and Family:      Attends Shinto Services:      Active Member of Clubs or Organizations:      Attends Club or Organization Meetings:      Marital Status:    Intimate Partner Violence:      Fear of Current or Ex-Partner:      Emotionally Abused:      Physically Abused:      Sexually Abused:           Family History:   No family history on file.    Patient denies known family history of bleeding, clotting, or   anesthesia-related complications.            Allergies:   No Known Allergies          Medications:     Prior to Admission medications    Medication Sig Last Dose Taking? Auth Provider   levonorgestrel-ethinyl estradiol (AVIANE) 0.1-20 MG-MCG tablet   Take 1 tablet by mouth daily  Yes Unknown, Entered By History   haloperidol (HALDOL) 1 MG tablet Take 1 tablet (1 mg) by mouth   nightly as needed for other (severe anxiety, auditory   hallucinations)   Donna Larry MD            Physical Exam:     Vitals:    09/08/21 1714   BP: 124/83   BP Location: Left arm   Pulse: 75   Resp: 16   Temp: 98.9  F (37.2  C)   TempSrc: Oral   SpO2: 100%     General: alert and oriented, not overtly psychotic, answers   questions but does not have insight   Neuro: EOM grossly intact  HEENT: atraumatic  Lungs: breathing comfortably on RA  Heart/Cardiovascular: well perfused    Left Lower Extremity:   - Left ankle swollen with ecchymosis over the dorsum of the foot   and around the heel region, but with positive wrinkling. Skin   completely intact with no areas of breakthrough.  - Actively dorsi and plantar flexes her ankle though limited   secondary to pain and swelling. Wiggles toes.  - SILT  superficial peroneal, deep peroneal, saphenous, sural, and   tibial nerve distributions.  - DP pulses palpable, toes warm and well perfused.            Imaging:   All imaging independently reviewed.     Xrays of the left ankle, calcaneus, and foot were reviewed which   demonstrate a significantly comminuted calcaneus fracture with   intraarticular involvement.         Labs:   CBC:  Lab Results   Component Value Date    WBC 4.2 06/17/2020    HGB 12.9 06/17/2020     06/17/2020       BMP:  Lab Results   Component Value Date     06/17/2020    POTASSIUM 3.9 06/17/2020    CHLORIDE 109 06/17/2020    CO2 27 06/17/2020    BUN 13 06/17/2020    CR 0.90 06/17/2020    ANIONGAP 3 06/17/2020    ALONA 8.6 06/17/2020     (H) 06/17/2020       Inflammatory Markers:  Lab Results   Component Value Date    WBC 4.2 06/17/2020    SED 6 11/01/2018

## 2021-09-21 NOTE — PLAN OF CARE
Problem: Behavioral Health Plan of Care  Goal: Develops/Participates in Therapeutic [place] to Support Successful Transition  Outcome: Improving     Problem: Behavioral Disturbance  Goal: Behavioral Disturbance  Description: Signs and symptoms of listed problems will be absent or manageable by discharge or transition of care.  Outcome: Improving   Pt visible in the milieu, calmly sitting in the lounge, flat/blunted affect. Mood is calm. Pleasant/appropriate in conversation. Attended group partially and left to use the phone. Patient will be discharging today at 1900 to home.

## 2021-09-21 NOTE — PROVIDER NOTIFICATION
Please see discharge summary for details regarding plan to discharge patient home today 9/21/2021 at 7 pm. Sister Alicia will take patient home.      Mayco Frazier M.D  PGY-1 Psychiatry.

## 2021-09-21 NOTE — PLAN OF CARE
Assessment/Intervention/Current Symptoms and Care Coordination  - chart review  - team meeting  - individual meeting with patient   - family care conference via phone - please also refer to psychiatry physiicna progress note . Patient's sister and father present on call. Both advocate for her discharge. Her sister says she will be home and will help ensure patient is taking her medications and attending the appointmetns. Says she understands concern for mental health symptoms of psychosis has been the one who has called for help in the past and will again if needed. She also indicates she will attend court - final commitment hearing on Friday with patient if she would like to.   Discussed recommendation for getting MRI. Patient is concerned about cost. This writer did inquire internally with business office for best contact for patient / or this writer to learn more about cost estimate. - per provider will be able to discharge even if MRI not complete.  Called number provided by business office - was told by them they aren't able to and gave number back to business office. Provided patient with number for her insurance and she was able to get through to learn more says she will follow up out patient as concerned about cost.     patient scheduled follow up with providers (see discharge instructions) - and signed ROIS for both location where will have medication management and individual therapy.     This writer spoke with [facility]  Vickie - updated her on current plan for discharge today. Vickie sent ROIS for patient to sign for her - 4 in total - for family, this facility and for locations of mental health follow up. Met with patient and she signed all rois. This writer sent back to Vickie         Discharge Plan or Goal  Pending stabilization & development of a safe discharge plan.  Considerations include: Return home with outpatient providers      Barriers to Discharge   Patient requires further  psychiatric stabilization due to current symptomology      Referral Status  Mental health follow up in discharge instructions     Legal Status  Will discharge - has civil commitment hearing on Friday - however given language in order is able to discharge prior to final hearing.    refer to legal section of chart, media and CTC previous notes.

## 2021-09-23 ENCOUNTER — TELEPHONE (OUTPATIENT)
Dept: PHARMACY | Facility: OTHER | Age: 31
End: 2021-09-23

## 2021-09-23 NOTE — TELEPHONE ENCOUNTER
MTM referral from: Transitions of Care (recent hospital discharge or ED visit)    MTM referral outreach attempt #2 on September 23, 2021 at 1:41 PM      Outcome: Patient declined. Pt is getting MTM services outside of [facility], will route to MTM Pharmacist/Provider as an FYI. Thank you for the referral.    Sai Olivarez, MTM coordinator

## 2023-01-28 ENCOUNTER — HEALTH MAINTENANCE LETTER (OUTPATIENT)
Age: 33
End: 2023-01-28

## 2024-02-25 ENCOUNTER — HEALTH MAINTENANCE LETTER (OUTPATIENT)
Age: 34
End: 2024-02-25

## 2024-12-23 NOTE — PROGRESS NOTES
"SPIRITUAL HEALTH SERVICES   Spiritual Assessment Progress Note (Behavioral Health/CD Focus)  [facility] ([facility] [place] [address]  ON-CALL    REFERRAL SOURCE:  request upon admission    On this visit, I talked with Tony about her understanding that she is dying and her desire for peace.    EXPERIENCE OF ILLNESS/HOSPITALIZATION:  Tony said she is \"at the end of my life\" and that \"I have this feeling I am going to die because I'm sick.\" She said she doesn't want to feel this way and that she wanted to leave things in a good place when she .    MEANING-MAKING: See below.    SPIRITUALITY/VALUES/Islam:  Tony talked about \"my Catholic\" when talking about wanting \"to be at peace before I die\" but when asked said \"I guess I don't have a Catholic, but I'd be interested in finding one.\" Additionally, Tony asked if I had taken care of people who had  and how I had done so, what I had offered them. I told her that I have human limitations and cannot give people peace, but that I can listen and talk to them.    COPING/SPIRITUAL PRACTICES: Tnoy talked about forgiveness and wanting to ask for it. She said \"I haven't hurt a lot of people in my life, have I?\" I told her that I couldn't honestly answer that question without knowing her story and she said, \"if I tell you my story, you will  me more than anyone ever has.\" I affirmed that her story is hers to tell when and with whom she feels comfortable and said that I was there to be a support, not a .    SUPPORT SYSTEMS: Tony expressed a desire to have people to talk things out with but that it hadn't worked for her in the past because she hadn't found the right people with whom she could talk where they wouldn't see her in a negative light.    PLAN: Tony's doctor asked to meet with her and I affirmed with Tony that meeting with her doctor is an important step for her to take today. I will notify unit  and [facility] remains available for " ongoing support during her hospitalization.                                                                                                                                              Violeta Mcknight  Chaplain Resident  Pager [phone]     Home